# Patient Record
Sex: FEMALE | Race: BLACK OR AFRICAN AMERICAN | Employment: UNEMPLOYED | URBAN - METROPOLITAN AREA
[De-identification: names, ages, dates, MRNs, and addresses within clinical notes are randomized per-mention and may not be internally consistent; named-entity substitution may affect disease eponyms.]

---

## 2010-01-01 LAB — SICKLE CELL SCREEN: NEGATIVE

## 2017-03-22 ENCOUNTER — TELEPHONE (OUTPATIENT)
Dept: OBGYN CLINIC | Age: 27
End: 2017-03-22

## 2017-03-22 DIAGNOSIS — R30.0 DYSURIA: ICD-10-CM

## 2017-03-22 DIAGNOSIS — R35.0 FREQUENCY OF URINATION: Primary | ICD-10-CM

## 2017-03-29 ENCOUNTER — OFFICE VISIT (OUTPATIENT)
Dept: OBGYN CLINIC | Age: 27
End: 2017-03-29
Payer: MEDICARE

## 2017-03-29 ENCOUNTER — HOSPITAL ENCOUNTER (OUTPATIENT)
Age: 27
Setting detail: SPECIMEN
Discharge: HOME | End: 2017-03-29
Payer: MEDICARE

## 2017-03-29 VITALS
SYSTOLIC BLOOD PRESSURE: 102 MMHG | HEIGHT: 61 IN | BODY MASS INDEX: 31.34 KG/M2 | DIASTOLIC BLOOD PRESSURE: 68 MMHG | WEIGHT: 166 LBS

## 2017-03-29 DIAGNOSIS — N89.8 VAGINAL DISCHARGE: ICD-10-CM

## 2017-03-29 DIAGNOSIS — R31.9 HEMATURIA: ICD-10-CM

## 2017-03-29 DIAGNOSIS — N30.01 ACUTE CYSTITIS WITH HEMATURIA: ICD-10-CM

## 2017-03-29 DIAGNOSIS — R31.9 HEMATURIA: Primary | ICD-10-CM

## 2017-03-29 LAB
-: ABNORMAL
AMORPHOUS: ABNORMAL
BACTERIA: ABNORMAL
BILIRUBIN URINE: NEGATIVE
BILIRUBIN, POC: ABNORMAL
BLOOD URINE, POC: ABNORMAL
CASTS UA: ABNORMAL /LPF (ref 0–2)
CLARITY, POC: CLEAR
COLOR, POC: YELLOW
COLOR: YELLOW
COMMENT UA: ABNORMAL
CRYSTALS, UA: ABNORMAL /HPF
DIRECT EXAM: ABNORMAL
EPITHELIAL CELLS UA: ABNORMAL /HPF (ref 0–5)
GLUCOSE URINE, POC: ABNORMAL
GLUCOSE URINE: NEGATIVE
KETONES, POC: ABNORMAL
KETONES, URINE: NEGATIVE
LEUKOCYTE EST, POC: ABNORMAL
LEUKOCYTE ESTERASE, URINE: ABNORMAL
Lab: ABNORMAL
MUCUS: ABNORMAL
NITRITE, POC: ABNORMAL
NITRITE, URINE: NEGATIVE
OTHER OBSERVATIONS UA: ABNORMAL
PH UA: 7.5 (ref 5–8)
PH, POC: 7
PROTEIN UA: NEGATIVE
PROTEIN, POC: ABNORMAL
RBC UA: ABNORMAL /HPF (ref 0–2)
RENAL EPITHELIAL, UA: ABNORMAL /HPF
SPECIFIC GRAVITY UA: 1.02 (ref 1–1.03)
SPECIFIC GRAVITY, POC: 1.01
SPECIMEN DESCRIPTION: ABNORMAL
STATUS: ABNORMAL
TRICHOMONAS: ABNORMAL
TURBIDITY: ABNORMAL
URINE HGB: NEGATIVE
UROBILINOGEN, POC: 0.2
UROBILINOGEN, URINE: NORMAL
WBC UA: ABNORMAL /HPF (ref 0–5)
YEAST: ABNORMAL

## 2017-03-29 PROCEDURE — 81002 URINALYSIS NONAUTO W/O SCOPE: CPT | Performed by: NURSE PRACTITIONER

## 2017-03-29 PROCEDURE — 99213 OFFICE O/P EST LOW 20 MIN: CPT | Performed by: NURSE PRACTITIONER

## 2017-03-29 RX ORDER — NITROFURANTOIN 25; 75 MG/1; MG/1
100 CAPSULE ORAL 2 TIMES DAILY
Qty: 10 CAPSULE | Refills: 0 | Status: SHIPPED | OUTPATIENT
Start: 2017-03-29 | End: 2017-04-03

## 2017-03-30 ENCOUNTER — TELEPHONE (OUTPATIENT)
Dept: OBGYN CLINIC | Age: 27
End: 2017-03-30

## 2017-03-30 DIAGNOSIS — N76.0 BV (BACTERIAL VAGINOSIS): Primary | ICD-10-CM

## 2017-03-30 DIAGNOSIS — B96.89 BV (BACTERIAL VAGINOSIS): Primary | ICD-10-CM

## 2017-03-30 RX ORDER — METRONIDAZOLE 500 MG/1
500 TABLET ORAL 2 TIMES DAILY
Qty: 14 TABLET | Refills: 0 | Status: SHIPPED | OUTPATIENT
Start: 2017-03-30 | End: 2017-04-06

## 2017-03-31 LAB
CULTURE: ABNORMAL
CULTURE: ABNORMAL
Lab: ABNORMAL
ORGANISM: ABNORMAL
SPECIMEN DESCRIPTION: ABNORMAL
STATUS: ABNORMAL

## 2017-03-31 RX ORDER — CIPROFLOXACIN 250 MG/1
250 TABLET, FILM COATED ORAL 2 TIMES DAILY
Qty: 20 TABLET | Refills: 0 | Status: SHIPPED | OUTPATIENT
Start: 2017-03-31 | End: 2017-04-10

## 2017-05-01 ENCOUNTER — HOSPITAL ENCOUNTER (OUTPATIENT)
Age: 27
Setting detail: SPECIMEN
Discharge: HOME | End: 2017-05-01
Payer: MEDICARE

## 2017-05-01 ENCOUNTER — OFFICE VISIT (OUTPATIENT)
Dept: OBGYN CLINIC | Age: 27
End: 2017-05-01
Payer: MEDICARE

## 2017-05-01 VITALS
WEIGHT: 162 LBS | BODY MASS INDEX: 30.58 KG/M2 | DIASTOLIC BLOOD PRESSURE: 60 MMHG | SYSTOLIC BLOOD PRESSURE: 110 MMHG | HEIGHT: 61 IN

## 2017-05-01 DIAGNOSIS — R30.0 DYSURIA: ICD-10-CM

## 2017-05-01 DIAGNOSIS — B96.89 BV (BACTERIAL VAGINOSIS): ICD-10-CM

## 2017-05-01 DIAGNOSIS — B96.89 BV (BACTERIAL VAGINOSIS): Primary | ICD-10-CM

## 2017-05-01 DIAGNOSIS — N76.0 BV (BACTERIAL VAGINOSIS): Primary | ICD-10-CM

## 2017-05-01 DIAGNOSIS — N76.0 BV (BACTERIAL VAGINOSIS): ICD-10-CM

## 2017-05-01 DIAGNOSIS — Z20.2 STD EXPOSURE: ICD-10-CM

## 2017-05-01 LAB
DIRECT EXAM: NORMAL
Lab: NORMAL
SPECIMEN DESCRIPTION: NORMAL
STATUS: NORMAL

## 2017-05-01 PROCEDURE — 99213 OFFICE O/P EST LOW 20 MIN: CPT | Performed by: OBSTETRICS & GYNECOLOGY

## 2017-05-02 LAB
C TRACH DNA GENITAL QL NAA+PROBE: NEGATIVE
N. GONORRHOEAE DNA: NEGATIVE

## 2017-05-15 ENCOUNTER — TELEPHONE (OUTPATIENT)
Dept: OBGYN CLINIC | Age: 27
End: 2017-05-15

## 2017-05-15 DIAGNOSIS — Z11.3 SCREEN FOR STD (SEXUALLY TRANSMITTED DISEASE): Primary | ICD-10-CM

## 2017-05-16 ENCOUNTER — TELEPHONE (OUTPATIENT)
Dept: OBGYN CLINIC | Age: 27
End: 2017-05-16

## 2017-05-17 DIAGNOSIS — Z11.3 SCREEN FOR STD (SEXUALLY TRANSMITTED DISEASE): ICD-10-CM

## 2017-06-21 ENCOUNTER — TELEPHONE (OUTPATIENT)
Dept: OBGYN CLINIC | Age: 27
End: 2017-06-21

## 2017-06-21 DIAGNOSIS — T50.905A MEDICATION ADVERSE EFFECT, INITIAL ENCOUNTER: Primary | ICD-10-CM

## 2017-07-03 ENCOUNTER — HOSPITAL ENCOUNTER (OUTPATIENT)
Age: 27
Setting detail: SPECIMEN
Discharge: HOME | End: 2017-07-03
Payer: MEDICARE

## 2017-07-03 ENCOUNTER — OFFICE VISIT (OUTPATIENT)
Dept: OBGYN CLINIC | Age: 27
End: 2017-07-03
Payer: MEDICARE

## 2017-07-03 VITALS
BODY MASS INDEX: 30.06 KG/M2 | WEIGHT: 159.2 LBS | HEIGHT: 61 IN | SYSTOLIC BLOOD PRESSURE: 120 MMHG | DIASTOLIC BLOOD PRESSURE: 70 MMHG

## 2017-07-03 DIAGNOSIS — Z32.01 POSITIVE URINE PREGNANCY TEST: ICD-10-CM

## 2017-07-03 DIAGNOSIS — Z01.419 VISIT FOR GYNECOLOGIC EXAMINATION: ICD-10-CM

## 2017-07-03 DIAGNOSIS — N91.2 AMENORRHEA: Primary | ICD-10-CM

## 2017-07-03 DIAGNOSIS — N91.2 AMENORRHEA: ICD-10-CM

## 2017-07-03 LAB
CHLAMYDIA CULTURE: NEGATIVE
CONTROL: PRESENT
CULTURE, GONORRHOEAE: NEGATIVE
PREGNANCY TEST URINE, POC: POSITIVE

## 2017-07-03 PROCEDURE — 81025 URINE PREGNANCY TEST: CPT | Performed by: OBSTETRICS & GYNECOLOGY

## 2017-07-03 PROCEDURE — 99213 OFFICE O/P EST LOW 20 MIN: CPT | Performed by: OBSTETRICS & GYNECOLOGY

## 2017-07-05 DIAGNOSIS — B00.9 HERPES: ICD-10-CM

## 2017-07-05 DIAGNOSIS — Z87.42 HISTORY OF ABNORMAL CERVICAL PAP SMEAR: ICD-10-CM

## 2017-07-05 DIAGNOSIS — O09.891 HISTORY OF PRETERM DELIVERY, CURRENTLY PREGNANT, FIRST TRIMESTER: ICD-10-CM

## 2017-07-05 DIAGNOSIS — Z32.01 POSITIVE PREGNANCY TEST: Primary | ICD-10-CM

## 2017-07-05 LAB
C TRACH DNA GENITAL QL NAA+PROBE: NEGATIVE
N. GONORRHOEAE DNA: NEGATIVE

## 2017-07-07 LAB — CYTOLOGY REPORT: NORMAL

## 2017-07-10 ENCOUNTER — HOSPITAL ENCOUNTER (OUTPATIENT)
Age: 27
Setting detail: SPECIMEN
Discharge: HOME | End: 2017-07-10
Payer: MEDICARE

## 2017-07-10 ENCOUNTER — INITIAL PRENATAL (OUTPATIENT)
Dept: OBGYN CLINIC | Age: 27
End: 2017-07-10

## 2017-07-10 VITALS — DIASTOLIC BLOOD PRESSURE: 60 MMHG | WEIGHT: 159 LBS | SYSTOLIC BLOOD PRESSURE: 112 MMHG | BODY MASS INDEX: 30.04 KG/M2

## 2017-07-10 DIAGNOSIS — N91.2 AMENORRHEA: ICD-10-CM

## 2017-07-10 DIAGNOSIS — Z32.01 POSITIVE URINE PREGNANCY TEST: ICD-10-CM

## 2017-07-10 LAB
-: NORMAL
AMORPHOUS: NORMAL
BACTERIA: NORMAL
BILIRUBIN URINE: NEGATIVE
CASTS UA: NORMAL /LPF (ref 0–8)
COLOR: YELLOW
COMMENT UA: ABNORMAL
CRYSTALS, UA: NORMAL /HPF
EPITHELIAL CELLS UA: NORMAL /HPF (ref 0–5)
GLUCOSE URINE: NEGATIVE
HEPATITIS B SURFACE ANTIGEN: NONREACTIVE
HIV AG/AB: NONREACTIVE
KETONES, URINE: NEGATIVE
LEUKOCYTE ESTERASE, URINE: ABNORMAL
MUCUS: NORMAL
NITRITE, URINE: NEGATIVE
OTHER OBSERVATIONS UA: NORMAL
PH UA: 6.5 (ref 5–8)
PROTEIN UA: NEGATIVE
RBC UA: NORMAL /HPF (ref 0–4)
RENAL EPITHELIAL, UA: NORMAL /HPF
RUBV IGG SER QL: >500 IU/ML
SPECIFIC GRAVITY UA: 1.02 (ref 1–1.03)
T. PALLIDUM, IGG: NONREACTIVE
TRICHOMONAS: NORMAL
TURBIDITY: CLEAR
URINE HGB: NEGATIVE
UROBILINOGEN, URINE: NORMAL
WBC UA: NORMAL /HPF (ref 0–5)
YEAST: NORMAL

## 2017-07-11 LAB
ABO/RH: NORMAL
ANTIBODY SCREEN: NEGATIVE
CULTURE: NORMAL
CULTURE: NORMAL
Lab: NORMAL
SPECIMEN DESCRIPTION: NORMAL
STATUS: NORMAL

## 2017-07-14 ENCOUNTER — PROCEDURE VISIT (OUTPATIENT)
Dept: OBGYN CLINIC | Age: 27
End: 2017-07-14
Payer: MEDICARE

## 2017-07-14 DIAGNOSIS — Z3A.09 9 WEEKS GESTATION OF PREGNANCY: Primary | ICD-10-CM

## 2017-07-14 DIAGNOSIS — Z36.89 ENCOUNTER TO ESTABLISH GESTATIONAL AGE USING ULTRASOUND: ICD-10-CM

## 2017-07-14 LAB
CRL: NORMAL CM
SAC DIAMETER: NORMAL CM

## 2017-07-14 PROCEDURE — 76801 OB US < 14 WKS SINGLE FETUS: CPT | Performed by: OBSTETRICS & GYNECOLOGY

## 2017-08-04 ENCOUNTER — ROUTINE PRENATAL (OUTPATIENT)
Dept: PERINATAL CARE | Age: 27
End: 2017-08-04
Payer: MEDICARE

## 2017-08-04 VITALS
WEIGHT: 156 LBS | TEMPERATURE: 98.7 F | SYSTOLIC BLOOD PRESSURE: 109 MMHG | BODY MASS INDEX: 30.63 KG/M2 | RESPIRATION RATE: 16 BRPM | DIASTOLIC BLOOD PRESSURE: 66 MMHG | HEART RATE: 89 BPM | HEIGHT: 60 IN

## 2017-08-04 DIAGNOSIS — Z36.9 FIRST TRIMESTER SCREENING: Primary | ICD-10-CM

## 2017-08-04 DIAGNOSIS — O09.211 HISTORY OF PRETERM LABOR, CURRENT PREGNANCY, FIRST TRIMESTER: ICD-10-CM

## 2017-08-04 DIAGNOSIS — O36.80X0 EXAMINATION TO DETERMINE FETAL VIABILITY OF PREGNANCY, NOT APPLICABLE OR UNSPECIFIED FETUS: ICD-10-CM

## 2017-08-04 DIAGNOSIS — Z3A.12 12 WEEKS GESTATION OF PREGNANCY: ICD-10-CM

## 2017-08-04 PROBLEM — O09.219 HISTORY OF PRETERM LABOR, CURRENT PREGNANCY: Status: ACTIVE | Noted: 2017-08-04

## 2017-08-04 PROCEDURE — 76813 OB US NUCHAL MEAS 1 GEST: CPT | Performed by: OBSTETRICS & GYNECOLOGY

## 2017-08-04 PROCEDURE — 76801 OB US < 14 WKS SINGLE FETUS: CPT | Performed by: OBSTETRICS & GYNECOLOGY

## 2017-08-08 ENCOUNTER — TELEPHONE (OUTPATIENT)
Dept: PERINATAL CARE | Age: 27
End: 2017-08-08

## 2017-08-09 ENCOUNTER — TELEPHONE (OUTPATIENT)
Dept: OBGYN CLINIC | Age: 27
End: 2017-08-09

## 2017-08-11 ENCOUNTER — HOSPITAL ENCOUNTER (OUTPATIENT)
Age: 27
Setting detail: SPECIMEN
Discharge: HOME | End: 2017-08-11
Payer: MEDICARE

## 2017-08-11 ENCOUNTER — ROUTINE PRENATAL (OUTPATIENT)
Dept: OBGYN CLINIC | Age: 27
End: 2017-08-11
Payer: MEDICARE

## 2017-08-11 VITALS
WEIGHT: 157.4 LBS | BODY MASS INDEX: 30.49 KG/M2 | DIASTOLIC BLOOD PRESSURE: 76 MMHG | HEART RATE: 82 BPM | SYSTOLIC BLOOD PRESSURE: 112 MMHG

## 2017-08-11 DIAGNOSIS — O09.891 HISTORY OF PRETERM DELIVERY, CURRENTLY PREGNANT, FIRST TRIMESTER: ICD-10-CM

## 2017-08-11 DIAGNOSIS — R35.0 URINARY FREQUENCY: ICD-10-CM

## 2017-08-11 DIAGNOSIS — O09.91 SUPERVISION OF HIGH RISK PREGNANCY IN FIRST TRIMESTER: ICD-10-CM

## 2017-08-11 DIAGNOSIS — Z3A.13 13 WEEKS GESTATION OF PREGNANCY: Primary | ICD-10-CM

## 2017-08-11 DIAGNOSIS — Z3A.13 13 WEEKS GESTATION OF PREGNANCY: ICD-10-CM

## 2017-08-11 LAB
ABSOLUTE EOS #: 0.2 K/UL (ref 0–0.4)
ABSOLUTE LYMPH #: 3.2 K/UL (ref 1–4.8)
ABSOLUTE MONO #: 1.2 K/UL (ref 0.1–1.2)
BASOPHILS # BLD: 0 %
BASOPHILS ABSOLUTE: 0 K/UL (ref 0–0.2)
DIFFERENTIAL TYPE: ABNORMAL
EOSINOPHILS RELATIVE PERCENT: 1 %
HCT VFR BLD CALC: 35.8 % (ref 36–46)
HEMOGLOBIN: 11.9 G/DL (ref 12–16)
LYMPHOCYTES # BLD: 20 %
MCH RBC QN AUTO: 27.7 PG (ref 26–34)
MCHC RBC AUTO-ENTMCNC: 33.3 G/DL (ref 31–37)
MCV RBC AUTO: 83.2 FL (ref 80–100)
MONOCYTES # BLD: 7 %
PDW BLD-RTO: 13.4 % (ref 12.5–15.4)
PLATELET # BLD: 290 K/UL (ref 140–450)
PLATELET ESTIMATE: ABNORMAL
PMV BLD AUTO: 9.1 FL (ref 6–12)
RBC # BLD: 4.31 M/UL (ref 4–5.2)
RBC # BLD: ABNORMAL 10*6/UL
SEG NEUTROPHILS: 72 %
SEGMENTED NEUTROPHILS ABSOLUTE COUNT: 11.2 K/UL (ref 1.8–7.7)
WBC # BLD: 15.7 K/UL (ref 3.5–11)
WBC # BLD: ABNORMAL 10*3/UL

## 2017-08-11 PROCEDURE — 99213 OFFICE O/P EST LOW 20 MIN: CPT | Performed by: NURSE PRACTITIONER

## 2017-08-11 NOTE — TELEPHONE ENCOUNTER
Adenike with [facility] connection calling to say we need to call [facility] pharmacy to cancel rx. Called [phone] # [phone], # [phone] spoke with Avila. He states their branch in [facility] is handling this. He then connected me with them . but got disconnected

## 2017-08-11 NOTE — TELEPHONE ENCOUNTER
Notified karen bricneo [facility] care connection that patient does not wish to receive injections.

## 2017-09-07 ENCOUNTER — TELEPHONE (OUTPATIENT)
Dept: OBGYN CLINIC | Age: 27
End: 2017-09-07

## 2017-09-07 ENCOUNTER — ROUTINE PRENATAL (OUTPATIENT)
Dept: PERINATAL CARE | Age: 27
End: 2017-09-07
Payer: MEDICARE

## 2017-09-07 ENCOUNTER — TELEPHONE (OUTPATIENT)
Dept: PERINATAL CARE | Age: 27
End: 2017-09-07

## 2017-09-07 ENCOUNTER — HOSPITAL ENCOUNTER (OUTPATIENT)
Age: 27
Discharge: HOME | End: 2017-09-07
Payer: MEDICARE

## 2017-09-07 VITALS
DIASTOLIC BLOOD PRESSURE: 66 MMHG | SYSTOLIC BLOOD PRESSURE: 105 MMHG | HEART RATE: 88 BPM | WEIGHT: 153 LBS | RESPIRATION RATE: 16 BRPM | TEMPERATURE: 98.8 F | BODY MASS INDEX: 29.63 KG/M2

## 2017-09-07 DIAGNOSIS — O34.10 LEIOMYOMA IN PREGNANCY, UTERINE, ANTEPARTUM: ICD-10-CM

## 2017-09-07 DIAGNOSIS — Z3A.17 17 WEEKS GESTATION OF PREGNANCY: ICD-10-CM

## 2017-09-07 DIAGNOSIS — Z13.89 ENCOUNTER FOR ROUTINE SCREENING FOR MALFORMATION USING ULTRASONICS: ICD-10-CM

## 2017-09-07 DIAGNOSIS — O28.0 HIGH RISK PREGNANCY WITH LOW PAPPA (PREGNANCY-ASSOCIATED PLASMA PROTEIN A): Primary | ICD-10-CM

## 2017-09-07 DIAGNOSIS — O09.212 HISTORY OF PRETERM LABOR, CURRENT PREGNANCY, SECOND TRIMESTER: ICD-10-CM

## 2017-09-07 DIAGNOSIS — D25.9 LEIOMYOMA IN PREGNANCY, UTERINE, ANTEPARTUM: ICD-10-CM

## 2017-09-07 DIAGNOSIS — O09.899 HIGH RISK PREGNANCY WITH LOW PAPPA (PREGNANCY-ASSOCIATED PLASMA PROTEIN A): Primary | ICD-10-CM

## 2017-09-07 PROCEDURE — 76805 OB US >/= 14 WKS SNGL FETUS: CPT | Performed by: OBSTETRICS & GYNECOLOGY

## 2017-09-07 PROCEDURE — 99242 OFF/OP CONSLTJ NEW/EST SF 20: CPT | Performed by: OBSTETRICS & GYNECOLOGY

## 2017-09-07 PROCEDURE — 36415 COLL VENOUS BLD VENIPUNCTURE: CPT

## 2017-09-07 PROCEDURE — 76817 TRANSVAGINAL US OBSTETRIC: CPT | Performed by: OBSTETRICS & GYNECOLOGY

## 2017-09-08 LAB
SEND OUT REPORT: NORMAL
TEST NAME: NORMAL

## 2017-09-11 ENCOUNTER — TELEPHONE (OUTPATIENT)
Dept: OBGYN CLINIC | Age: 27
End: 2017-09-11

## 2017-09-21 ENCOUNTER — ROUTINE PRENATAL (OUTPATIENT)
Dept: PERINATAL CARE | Age: 27
End: 2017-09-21
Payer: MEDICARE

## 2017-09-21 VITALS
SYSTOLIC BLOOD PRESSURE: 100 MMHG | TEMPERATURE: 99.2 F | RESPIRATION RATE: 16 BRPM | HEART RATE: 84 BPM | BODY MASS INDEX: 30.41 KG/M2 | DIASTOLIC BLOOD PRESSURE: 60 MMHG | WEIGHT: 157 LBS

## 2017-09-21 DIAGNOSIS — O09.899 HIGH RISK PREGNANCY WITH LOW PAPPA (PREGNANCY-ASSOCIATED PLASMA PROTEIN A): ICD-10-CM

## 2017-09-21 DIAGNOSIS — O09.212 HISTORY OF PRETERM LABOR, CURRENT PREGNANCY, SECOND TRIMESTER: Primary | ICD-10-CM

## 2017-09-21 DIAGNOSIS — Z13.89 ENCOUNTER FOR ROUTINE SCREENING FOR MALFORMATION USING ULTRASONICS: ICD-10-CM

## 2017-09-21 DIAGNOSIS — O28.0 HIGH RISK PREGNANCY WITH LOW PAPPA (PREGNANCY-ASSOCIATED PLASMA PROTEIN A): ICD-10-CM

## 2017-09-21 DIAGNOSIS — Z3A.19 19 WEEKS GESTATION OF PREGNANCY: ICD-10-CM

## 2017-09-21 PROCEDURE — 76815 OB US LIMITED FETUS(S): CPT | Performed by: OBSTETRICS & GYNECOLOGY

## 2017-09-21 PROCEDURE — 76817 TRANSVAGINAL US OBSTETRIC: CPT | Performed by: OBSTETRICS & GYNECOLOGY

## 2017-09-22 PROBLEM — O44.42 LOW-LYING PLACENTA IN SECOND TRIMESTER: Status: ACTIVE | Noted: 2017-09-22

## 2017-09-29 ENCOUNTER — HOSPITAL ENCOUNTER (OUTPATIENT)
Age: 27
Discharge: HOME | End: 2017-09-29
Attending: OBSTETRICS & GYNECOLOGY | Admitting: OBSTETRICS & GYNECOLOGY
Payer: MEDICARE

## 2017-09-29 ENCOUNTER — TELEPHONE (OUTPATIENT)
Dept: OBGYN CLINIC | Age: 27
End: 2017-09-29

## 2017-09-29 VITALS
HEART RATE: 94 BPM | RESPIRATION RATE: 18 BRPM | TEMPERATURE: 97.8 F | SYSTOLIC BLOOD PRESSURE: 108 MMHG | DIASTOLIC BLOOD PRESSURE: 58 MMHG

## 2017-09-29 DIAGNOSIS — O44.42 LOW-LYING PLACENTA IN SECOND TRIMESTER: ICD-10-CM

## 2017-09-29 LAB
-: ABNORMAL
AMORPHOUS: ABNORMAL
BACTERIA: ABNORMAL
BILIRUBIN URINE: NEGATIVE
CASTS UA: ABNORMAL /LPF (ref 0–8)
COLOR: YELLOW
CRYSTALS, UA: ABNORMAL /HPF
EPITHELIAL CELLS UA: ABNORMAL /HPF (ref 0–5)
GLUCOSE URINE: NEGATIVE
KETONES, URINE: NEGATIVE
LEUKOCYTE ESTERASE, URINE: ABNORMAL
MUCUS: ABNORMAL
NITRITE, URINE: NEGATIVE
OTHER OBSERVATIONS UA: ABNORMAL
PH UA: 8.5 (ref 5–8)
PROTEIN UA: NEGATIVE
RBC UA: ABNORMAL /HPF (ref 0–4)
RENAL EPITHELIAL, UA: ABNORMAL /HPF
SPECIFIC GRAVITY UA: 1.02 (ref 1–1.03)
TRICHOMONAS: ABNORMAL
TURBIDITY: ABNORMAL
URINE HGB: NEGATIVE
UROBILINOGEN, URINE: NORMAL
WBC UA: ABNORMAL /HPF (ref 0–5)
YEAST: ABNORMAL

## 2017-09-29 PROCEDURE — 6370000000 HC RX 637 (ALT 250 FOR IP): Performed by: OBSTETRICS & GYNECOLOGY

## 2017-09-29 PROCEDURE — 81001 URINALYSIS AUTO W/SCOPE: CPT

## 2017-09-29 PROCEDURE — 87086 URINE CULTURE/COLONY COUNT: CPT

## 2017-09-29 PROCEDURE — 99213 OFFICE O/P EST LOW 20 MIN: CPT

## 2017-09-29 RX ORDER — ACETAMINOPHEN 500 MG
1000 TABLET ORAL EVERY 6 HOURS PRN
Status: DISCONTINUED | OUTPATIENT
Start: 2017-09-29 | End: 2017-09-29 | Stop reason: HOSPADM

## 2017-09-29 RX ADMIN — ACETAMINOPHEN 1000 MG: 500 TABLET ORAL at 13:26

## 2017-09-29 ASSESSMENT — PAIN SCALES - GENERAL: PAINLEVEL_OUTOF10: 4

## 2017-09-29 NOTE — IP AVS SNAPSHOT
Patient Information     Patient Name JAIDEN Haro 1990         This is your updated medication list to keep with you all times      ASK your doctor about these medications     RAFY-RUL PRENATAL VITAMINS 28-0.8 MG Tabs   Take 1 tablet by mouth daily       Progesterone 200 MG Supp

## 2017-09-29 NOTE — IP AVS SNAPSHOT
2. Merge onto [address] via [address] on the LEFT toward [address] / Lanie Buchanan / Mariah Silva   3. Take the [address] exit, [address]   4. Turn LEFT onto [address]   5. Turn RIGHT onto [address] [place] Obrien is on the RIGHT   [address] is in the [place]:   1. Merge onto [address]   2. Merge onto [address] toward [address]   3. Merge onto [address] toward [address]   4. Take the [address] exit, [address]   5. Turn LEFT onto [address]   6. Turn RIGHT onto [address] [place] Obrien is on the RIGHT   [address] is in the [place]:   1. Merge onto [address]   2. Merge onto [address] / [address] toward [address]   3. Merge onto [address] toward [address]   4. Take the [address] exit, [address]   5. Turn LEFT onto [address]   6. Turn RIGHT onto [address]   7. [address] is on the RIGHT   [address] is in the [facility] Appointments     10/5/2017 3:45 PM     Appointment with Padmini Santiago MD at [facility] ([phone])   Patients are asked to arrive 15 minutes prior to scheduled appointment time to complete paper work. [address] [place] [id number]       11/3/2017 4:30 PM     Appointment with Brittany Bullock CNP at [facility] ([phone])   Patients are asked to arrive 15 minutes prior to scheduled appointment time to complete paper work. [facility]         Preventive Care        Date Due    Tetanus Combination Vaccine (1 - Tdap) 12/26/2008    Yearly Flu Vaccine (1) 9/1/2017    Pap Smear 7/3/2018                 Care Plan Once You Return Home    This section includes instructions you will need to follow once you leave the hospital.  Your care team will discuss these with you, so you and those caring for you know how to best care for your health needs at home. This section may also include educational information about certain health topics that may be of help to you. Discharge Instructions       Antepartum discharge. (before labor)    Call physician if:  Contractions every 5 minutes if first baby, every  10 minutes if 2nd or more pregnancy. Vaginal bleeding like first day of period. Gush or leaking of fluid. Nausea , vomiting or diarrhea lasting 8 hours or longer. Temp of 100.4 for 2 instances. Cramping which is not relieved with emptying bladder, increasing water intake or resting. Change in baby movement or absence of fetal movement. .    Call if pain, urgency or burning with urination. Drink 10-12 glasses of water daily    Take all of medications prescribed. Keep next scheduled appointment. [employer] Signup     Our records indicate that you have an active [employer] account. You can view your After Visit Summary by going to [email or web address] and logging in with your [employer] username and password. If you don't have a [employer] username and password but a parent or guardian has access to your record, the parent or guardian should login with their own [employer] username and password and access your record to view the After Visit Summary. Additional Information  If you have questions, please contact the physician practice where you receive care. Remember, [employer] is NOT to be used for urgent needs. For medical emergencies, dial 911. For questions regarding your [employer] account call [phone]. If you have a clinical question, please call your doctor's office. View your information online  ? Review your current list of  medications, immunization, and allergies. ? Review your future test results online . ?  Review your discharge instructions provided by your caregivers at discharge    Certain functionality such as prescription refills, scheduling appointments or sending messages to your provider are not activated if your provider does not use [employer] in his/her office    For questions regarding your MyChart account call [phone]. If you have a clinical question, please call your doctor's office. The information on all pages of the After Visit Summary has been reviewed with me, the patient and/or responsible adult, by my health care provider(s). I had the opportunity to ask questions regarding this information. I understand I should dispose of my armband safely at home to protect my health information. A complete copy of the After Visit Summary has been given to me, the patient and/or responsible adult.            Patient Signature/Responsible Adult:____________________    Clinician Signature:_____________________    Date:_____________________    Time:_____________________

## 2017-09-30 LAB
CULTURE: NORMAL
CULTURE: NORMAL
Lab: NORMAL
SPECIMEN DESCRIPTION: NORMAL
STATUS: NORMAL

## 2017-10-05 ENCOUNTER — ROUTINE PRENATAL (OUTPATIENT)
Dept: OBGYN CLINIC | Age: 27
End: 2017-10-05
Payer: MEDICARE

## 2017-10-05 VITALS
HEART RATE: 80 BPM | BODY MASS INDEX: 29.94 KG/M2 | WEIGHT: 154.6 LBS | SYSTOLIC BLOOD PRESSURE: 122 MMHG | DIASTOLIC BLOOD PRESSURE: 76 MMHG

## 2017-10-05 DIAGNOSIS — O09.899 HIGH RISK PREGNANCY WITH LOW PAPPA: ICD-10-CM

## 2017-10-05 DIAGNOSIS — D25.9 UTERINE LEIOMYOMA, UNSPECIFIED LOCATION: Primary | ICD-10-CM

## 2017-10-05 DIAGNOSIS — O28.0 HIGH RISK PREGNANCY WITH LOW PAPPA: ICD-10-CM

## 2017-10-05 DIAGNOSIS — O09.892 HISTORY OF PRETERM DELIVERY, CURRENTLY PREGNANT IN SECOND TRIMESTER: ICD-10-CM

## 2017-10-05 DIAGNOSIS — O44.42 LOW-LYING PLACENTA IN SECOND TRIMESTER: ICD-10-CM

## 2017-10-05 PROCEDURE — 99213 OFFICE O/P EST LOW 20 MIN: CPT | Performed by: OBSTETRICS & GYNECOLOGY

## 2017-10-05 RX ORDER — NITROFURANTOIN 25; 75 MG/1; MG/1
100 CAPSULE ORAL 2 TIMES DAILY
COMMUNITY
End: 2017-10-18 | Stop reason: ALTCHOICE

## 2017-10-18 ENCOUNTER — ROUTINE PRENATAL (OUTPATIENT)
Dept: PERINATAL CARE | Age: 27
End: 2017-10-18
Payer: MEDICARE

## 2017-10-18 ENCOUNTER — HOSPITAL ENCOUNTER (OUTPATIENT)
Age: 27
Discharge: HOME | End: 2017-10-18
Payer: MEDICARE

## 2017-10-18 VITALS
RESPIRATION RATE: 16 BRPM | SYSTOLIC BLOOD PRESSURE: 111 MMHG | HEIGHT: 62 IN | WEIGHT: 155 LBS | HEART RATE: 116 BPM | BODY MASS INDEX: 28.52 KG/M2 | DIASTOLIC BLOOD PRESSURE: 68 MMHG | TEMPERATURE: 98.4 F

## 2017-10-18 DIAGNOSIS — O34.10 LEIOMYOMA IN PREGNANCY, UTERINE, ANTEPARTUM: ICD-10-CM

## 2017-10-18 DIAGNOSIS — D25.9 LEIOMYOMA IN PREGNANCY, UTERINE, ANTEPARTUM: ICD-10-CM

## 2017-10-18 DIAGNOSIS — O26.879 CERVICAL SHORTENING, ANTEPARTUM CONDITION OR COMPLICATION: ICD-10-CM

## 2017-10-18 DIAGNOSIS — O09.212 CURRENT PREGNANCY WITH HISTORY OF PRE-TERM LABOR IN SECOND TRIMESTER: ICD-10-CM

## 2017-10-18 DIAGNOSIS — O09.899 HIGH RISK PREGNANCY WITH LOW PAPPA (PREGNANCY-ASSOCIATED PLASMA PROTEIN A): Primary | ICD-10-CM

## 2017-10-18 DIAGNOSIS — O28.0 HIGH RISK PREGNANCY WITH LOW PAPPA (PREGNANCY-ASSOCIATED PLASMA PROTEIN A): Primary | ICD-10-CM

## 2017-10-18 DIAGNOSIS — Z3A.23 23 WEEKS GESTATION OF PREGNANCY: ICD-10-CM

## 2017-10-18 PROCEDURE — 76817 TRANSVAGINAL US OBSTETRIC: CPT | Performed by: OBSTETRICS & GYNECOLOGY

## 2017-10-18 PROCEDURE — 36415 COLL VENOUS BLD VENIPUNCTURE: CPT

## 2017-10-18 PROCEDURE — 82105 ALPHA-FETOPROTEIN SERUM: CPT

## 2017-10-18 PROCEDURE — 76811 OB US DETAILED SNGL FETUS: CPT | Performed by: OBSTETRICS & GYNECOLOGY

## 2017-10-18 RX ORDER — AMOXICILLIN 500 MG/1
500 CAPSULE ORAL 2 TIMES DAILY
COMMUNITY
End: 2017-11-02 | Stop reason: ALTCHOICE

## 2017-10-20 ENCOUNTER — TELEPHONE (OUTPATIENT)
Dept: OBGYN CLINIC | Age: 27
End: 2017-10-20

## 2017-10-20 LAB
AFP INTERPRETATION: NORMAL
AFP MOM: 1.05
AFP SPECIMEN: NORMAL
AFP: 107 NG/ML
DATE OF BIRTH: NORMAL
DATING METHOD: NORMAL
DETERMINED BY: NORMAL
DIABETIC: NO
DUE DATE: NORMAL
ESTIMATED DUE DATE: NORMAL
FAMILY HISTORY NTD: NO
GESTATIONAL AGE: 23.14 WEEKS
HISTORY OF ANEUPLOIDY?: NORMAL
INSULIN REQ DIABETES: NO
LAST MENSTRUAL PERIOD: NORMAL
MATERNAL AGE AT EDD: 28.1 YR
MATERNAL WEIGHT: 155
NUMBER OF FETUSES: NORMAL
PATIENT WEIGHT: 155 LBS
PHYSICIAN: NORMAL
RACE (MATERNAL): NORMAL
RACE: NORMAL
ZZ NTE CLEAN UP: HISTORY: NO

## 2017-10-23 ENCOUNTER — TELEPHONE (OUTPATIENT)
Dept: PERINATAL CARE | Age: 27
End: 2017-10-23

## 2017-10-23 NOTE — TELEPHONE ENCOUNTER
Phone call from pt stating she is unable to get a refill on her progesterone vaginal suppositories from [facility] and needs to have called into [facility] on [address]- O#[phone]. Writer contacted [facility] and spoke to pharmacist, Suzie Diamond, who confirmed the inability to refill suppositories. Writer called into [facility] above, pharmacist, Filipe Grey, - 250mg progesterone vaginal suppositories, 1 every hs, 1 mos supply with 2 refills.

## 2017-10-31 ENCOUNTER — HOSPITAL ENCOUNTER (OUTPATIENT)
Age: 27
Setting detail: OBSERVATION
Discharge: HOME | End: 2017-11-01
Attending: OBSTETRICS & GYNECOLOGY | Admitting: OBSTETRICS & GYNECOLOGY
Payer: MEDICARE

## 2017-10-31 PROBLEM — D25.9 UTERINE FIBROIDS AFFECTING PREGNANCY: Status: ACTIVE | Noted: 2017-10-31

## 2017-10-31 PROBLEM — O34.10 UTERINE FIBROIDS AFFECTING PREGNANCY: Status: ACTIVE | Noted: 2017-10-31

## 2017-10-31 PROBLEM — O47.00 PRETERM CONTRACTIONS: Status: ACTIVE | Noted: 2017-10-31

## 2017-10-31 PROBLEM — O09.90 HIGH-RISK PREGNANCY: Status: ACTIVE | Noted: 2017-10-31

## 2017-10-31 PROBLEM — O26.879 CERVICAL SHORTENING: Status: ACTIVE | Noted: 2017-10-31

## 2017-10-31 PROBLEM — O09.92 HRP (HIGH RISK PREGNANCY), SECOND TRIMESTER: Status: ACTIVE | Noted: 2017-10-31

## 2017-10-31 LAB
-: ABNORMAL
AMORPHOUS: ABNORMAL
BACTERIA: ABNORMAL
BILIRUBIN URINE: NEGATIVE
CASTS UA: ABNORMAL /LPF (ref 0–8)
COLOR: YELLOW
COMMENT UA: ABNORMAL
CRYSTALS, UA: ABNORMAL /HPF
DIRECT EXAM: ABNORMAL
EPITHELIAL CELLS UA: ABNORMAL /HPF (ref 0–5)
GLUCOSE URINE: NEGATIVE
KETONES, URINE: ABNORMAL
LEUKOCYTE ESTERASE, URINE: ABNORMAL
Lab: ABNORMAL
MUCUS: ABNORMAL
NITRITE, URINE: NEGATIVE
OTHER OBSERVATIONS UA: ABNORMAL
PH UA: 8 (ref 5–8)
PROTEIN UA: NEGATIVE
RBC UA: ABNORMAL /HPF (ref 0–4)
RENAL EPITHELIAL, UA: ABNORMAL /HPF
SPECIFIC GRAVITY UA: 1.01 (ref 1–1.03)
SPECIMEN DESCRIPTION: ABNORMAL
STATUS: ABNORMAL
TRICHOMONAS: ABNORMAL
TURBIDITY: CLEAR
URINE HGB: NEGATIVE
UROBILINOGEN, URINE: NORMAL
WBC UA: ABNORMAL /HPF (ref 0–5)
YEAST: ABNORMAL

## 2017-10-31 PROCEDURE — 87510 GARDNER VAG DNA DIR PROBE: CPT

## 2017-10-31 PROCEDURE — 87086 URINE CULTURE/COLONY COUNT: CPT

## 2017-10-31 PROCEDURE — 87480 CANDIDA DNA DIR PROBE: CPT

## 2017-10-31 PROCEDURE — 87591 N.GONORRHOEAE DNA AMP PROB: CPT

## 2017-10-31 PROCEDURE — G0378 HOSPITAL OBSERVATION PER HR: HCPCS

## 2017-10-31 PROCEDURE — 86403 PARTICLE AGGLUT ANTBDY SCRN: CPT

## 2017-10-31 PROCEDURE — 87660 TRICHOMONAS VAGIN DIR PROBE: CPT

## 2017-10-31 PROCEDURE — 96360 HYDRATION IV INFUSION INIT: CPT

## 2017-10-31 PROCEDURE — 81001 URINALYSIS AUTO W/SCOPE: CPT

## 2017-10-31 PROCEDURE — 6360000002 HC RX W HCPCS: Performed by: STUDENT IN AN ORGANIZED HEALTH CARE EDUCATION/TRAINING PROGRAM

## 2017-10-31 PROCEDURE — 87186 SC STD MICRODIL/AGAR DIL: CPT

## 2017-10-31 PROCEDURE — 87491 CHLMYD TRACH DNA AMP PROBE: CPT

## 2017-10-31 PROCEDURE — 2580000003 HC RX 258: Performed by: OBSTETRICS & GYNECOLOGY

## 2017-10-31 PROCEDURE — 96361 HYDRATE IV INFUSION ADD-ON: CPT

## 2017-10-31 PROCEDURE — 2580000003 HC RX 258: Performed by: STUDENT IN AN ORGANIZED HEALTH CARE EDUCATION/TRAINING PROGRAM

## 2017-10-31 PROCEDURE — 96372 THER/PROPH/DIAG INJ SC/IM: CPT

## 2017-10-31 PROCEDURE — 76817 TRANSVAGINAL US OBSTETRIC: CPT | Performed by: OBSTETRICS & GYNECOLOGY

## 2017-10-31 PROCEDURE — 6370000000 HC RX 637 (ALT 250 FOR IP): Performed by: STUDENT IN AN ORGANIZED HEALTH CARE EDUCATION/TRAINING PROGRAM

## 2017-10-31 RX ORDER — TERBUTALINE SULFATE 1 MG/ML
0.25 INJECTION, SOLUTION SUBCUTANEOUS ONCE
Status: COMPLETED | OUTPATIENT
Start: 2017-10-31 | End: 2017-10-31

## 2017-10-31 RX ORDER — DIPHENHYDRAMINE HCL 25 MG
25 TABLET ORAL ONCE
Status: COMPLETED | OUTPATIENT
Start: 2017-10-31 | End: 2017-10-31

## 2017-10-31 RX ORDER — ACETAMINOPHEN 500 MG
1000 TABLET ORAL EVERY 6 HOURS PRN
Status: DISCONTINUED | OUTPATIENT
Start: 2017-10-31 | End: 2017-11-01 | Stop reason: HOSPADM

## 2017-10-31 RX ORDER — PRENATAL WITH FERROUS FUM AND FOLIC ACID 3080; 920; 120; 400; 22; 1.84; 3; 20; 10; 1; 12; 200; 27; 25; 2 [IU]/1; [IU]/1; MG/1; [IU]/1; MG/1; MG/1; MG/1; MG/1; MG/1; MG/1; UG/1; MG/1; MG/1; MG/1; MG/1
1 TABLET ORAL DAILY
Status: DISCONTINUED | OUTPATIENT
Start: 2017-10-31 | End: 2017-11-01 | Stop reason: HOSPADM

## 2017-10-31 RX ORDER — SODIUM CHLORIDE, SODIUM LACTATE, POTASSIUM CHLORIDE, CALCIUM CHLORIDE 600; 310; 30; 20 MG/100ML; MG/100ML; MG/100ML; MG/100ML
INJECTION, SOLUTION INTRAVENOUS CONTINUOUS
Status: DISCONTINUED | OUTPATIENT
Start: 2017-10-31 | End: 2017-10-31

## 2017-10-31 RX ORDER — SODIUM CHLORIDE, SODIUM LACTATE, POTASSIUM CHLORIDE, AND CALCIUM CHLORIDE .6; .31; .03; .02 G/100ML; G/100ML; G/100ML; G/100ML
1000 INJECTION, SOLUTION INTRAVENOUS ONCE
Status: COMPLETED | OUTPATIENT
Start: 2017-10-31 | End: 2017-10-31

## 2017-10-31 RX ORDER — BETAMETHASONE SODIUM PHOSPHATE AND BETAMETHASONE ACETATE 3; 3 MG/ML; MG/ML
12 INJECTION, SUSPENSION INTRA-ARTICULAR; INTRALESIONAL; INTRAMUSCULAR; SOFT TISSUE ONCE
Status: COMPLETED | OUTPATIENT
Start: 2017-10-31 | End: 2017-10-31

## 2017-10-31 RX ADMIN — Medication 200 MG: at 21:37

## 2017-10-31 RX ADMIN — TERBUTALINE SULFATE 0.25 MG: 1 INJECTION SUBCUTANEOUS at 01:59

## 2017-10-31 RX ADMIN — SODIUM CHLORIDE, POTASSIUM CHLORIDE, SODIUM LACTATE AND CALCIUM CHLORIDE 1000 ML: 600; 310; 30; 20 INJECTION, SOLUTION INTRAVENOUS at 02:58

## 2017-10-31 RX ADMIN — DIPHENHYDRAMINE HCL 25 MG: 25 TABLET ORAL at 23:24

## 2017-10-31 RX ADMIN — BETAMETHASONE SODIUM PHOSPHATE AND BETAMETHASONE ACETATE 12 MG: 3; 3 INJECTION, SUSPENSION INTRA-ARTICULAR; INTRALESIONAL; INTRAMUSCULAR at 02:00

## 2017-10-31 RX ADMIN — SODIUM CHLORIDE, POTASSIUM CHLORIDE, SODIUM LACTATE AND CALCIUM CHLORIDE: 600; 310; 30; 20 INJECTION, SOLUTION INTRAVENOUS at 12:11

## 2017-10-31 NOTE — PROGRESS NOTES
Maternal Fetal Medicine   Interval Note    Whit Hayes is a 31 y.o. female  at 25w0d     Case discussed with Dr. Jaime Abarca (on call for Dr. Kasandra Hunter)    MFM Sono Report: Shortened cervical length for gestational age, cervical length 10 mm with funneling. Multiple uterine leiomyomas. Cephalic, Anterior placenta. Plan: MFM recommended cultures for further investigation of  contractions. Dr. Jaime Abarca updated and in agreement with plan. Discussed collection of vaginitis probe (Affirm), GC/C as well as a UA w/ reflex. Will continue to monitor overnight as well as keep patient to receive her second dose of Celestone. Dr Jaime Abarca to round in morning (17)    OB Residents updated, RN updated.      Stewart Grace DO  Ob/Gyn Resident  10/31/2017, 3:30 PM

## 2017-10-31 NOTE — PROGRESS NOTES
Date: 10/31/2017  Time: 9:23 AM    Ob/Gyn Attending Physician Statement  I have discussed the care of Margaux Davis, including pertinent history and exam findings,  with the resident. I have reviewed their note in the electronic medical record. I have seen and examined the patient and the key elements of all parts of the encounter have been performed/reviewed by me . I agree with the assessment, plan and orders as documented by the resident. Vitals:    10/31/17 0031 10/31/17 0741   BP: 111/66 109/65   Pulse: 95 88   Resp: 18 18   Temp: 98.9 °F (37.2 °C) 97.3 °F (36.3 °C)   TempSrc: Oral Oral     The patient was at [facility] with contractions, but did not change her cervix, so I felt she was stable enough to leave there (where she should not have gone) but told her to be sure to come to [facility]. V.  Here, she has received a dose of Terbutaline which did seem to quiet the contractions, and got her first dose of steroids at 0200. She says she started cramping again at about 0600. She looks comfortable and contractions are small on monitor, but I can see them, every 5 minutes or so. I will ask [facility] to comment, if Dr. George Tejada has any other suggestions. Otherwise, I intend for her to stay, at least until there second dose of steroids.     Attending's Name: Basil Murguia M.D.

## 2017-10-31 NOTE — DISCHARGE SUMMARY
Obstetric Discharge Summary  [facility]    Patient Name: Jamal Khan  Patient : 1990  Primary Care Physician: No primary care provider on file. Admit Date: 10/31/2017    Principal Diagnosis: IUP at 25w0d, admitted for  contractions     Her pregnancy has been complicated by:   Patient Active Problem List   Diagnosis    ASCUS (atypical squamous cells of undetermined significance) on Pap smear    Herpes simplex type 2 infection    H/O PTD (G1 @ 36wks)    Low TORI-A    Leiomyoma in pregnancy, uterine, antepartum    Low-lying placenta in second trimester    Cervical shortening, antepartum condition or complication    Rh+/RI/GBS unk     contractions    Shortened cervix    Uterine fibroids    S/p Celestone 10/31,     Threatened  labor, antepartum    Current pregnancy with history of pre-term labor in second trimester       Infection Present?: Yes - UTI, bacterial vaginosis   Hospital Acquired: No      Consultations: MFM    Pertinent Findings & Procedures:   Jamal Khan is a 31 y.o. female  at 25w0d admitted for  contractions ; received Terbutaline x1, IV fluids and initial course of Celestone x on 10/30 and . MFM was consulted on 10/31. MFM scan on 10/31 revealed a shortened cervical length for gestational age, cervical length 10 mm with funneling. Multiple uterine leiomyomas. Cephalic, Anterior placenta. .     Cultures were collected and revealed bacterial vaginosis, treatment with Flagyl was initiated. Patient was also found to have bacteruria and was started on keflex 500 mg QID x 7 days.        Course of patient: uncomplicated    Discharge to: Home    Readmission planned: no     Recommendations on Discharge:     Medications:      Medication List      START taking these medications    cephALEXin 500 MG capsule  Commonly known as:  KEFLEX  Take 1 capsule by mouth 4 times daily for 7 days     metroNIDAZOLE 500 MG

## 2017-10-31 NOTE — PROGRESS NOTES
PROGRESS NOTE    Subjective:   Patient has been seen and examined. Patient is resting comfortably in bed. Patient is reports her contractions have improved and she is now feeling occasional contractions. Denies headaches, vision changes, chest pain, SOB, RUQ pain, N/V, or increased peripheral edema. Denies urinary symptoms or vaginal discharge. She reports fetal movement is present, complains of occasional contractions, denies loss of fluid, denies vaginal bleeding.       Objective:   Vitals:  Vitals:    10/31/17 0031   BP: 111/66   Pulse: 95   Resp: 18   Temp: 98.9 °F (37.2 °C)   TempSrc: Oral       Physical Exam:  Fetal Heart Monitor:  Baseline Heart Rate 140, moderate variability, present accelerations, absent decelerations  [place]: contractions, rare     General appearance:  awake, alert, cooperative, no apparent distress, and appears stated age  Neurologic:  alert, oriented, normal speech, no focal findings or movement disorder noted  Lungs:  No increased work of breathing, good air exchange, clear to auscultation bilaterally, no crackles or wheezing  Heart:  Normal apical impulse, regular rate and rhythm, normal S1 and S2, no S3 or S4, and no murmur noted  Abdomen:  abdomen is soft without significant tenderness, masses, organomegaly or guarding  Extremities: no calf tenderness, edematous BLE       Assessment/Plan:  Edmundo Gill is a 31 y.o. female  25w0d IUP   - GBS not done / Rh positive / R immune   - No indication for GBS prophylaxis, unless delivery imminent       Contractions   - Afebrile, VSS   - FHT: category I tracing   - TOCO: contractions, rare    - IV fluid bolus   - S/P Terbutaline x1   - S/P Celestone on 10/31/17; next dose on 17 at 0200   - Patient reports contractions have now spaced out    - SCDs     H/O spontaneous  delivery    - G1 @ 36w2d   - Following with MFM every two weeks for TVUS   - Progesterone 200 mg vaginal suppositories     Shortened cervix    - Slightly shortened cervix on anatomy ultrasound (27 mm)     Low TORI-A    - Low TORI-A on first trimester screen    - NIPT wnl     Low -lying placenta (resolved)     Uterine fibroids     Patient Active Problem List    Diagnosis Date Noted    Rh+/RI/GBS unk 10/31/2017     contractions 10/31/2017    Shortened cervix 10/31/2017    Uterine fibroids 10/31/2017    S/p Celestone 10/31/17, next on 11/1/17  10/31/2017     Overview Note:     Celestone 10/31/17, next on 17 @ 0200      Cervical shortening, antepartum condition or complication     Low-lying placenta in second trimester 2017     Overview Note:     14.2 mm from internal os on anatomy scan 17.  Low TORI-A 2017     Overview Note:     Growth scans q4-6 wks for the duration of pregnancy.  testing starting at 32 weeks (twice weekly NST, weekly GELA/BPP).       Leiomyoma in pregnancy, uterine, antepartum 2017    H/O PTD (G1 @ 36wks) 2017     Overview Note:     U/S q 2 weeks from 16-34 weeks, [place] until 36.6 weeks      Herpes simplex type 2 infection 2015    Hyperglycemia     ASCUS (atypical squamous cells of undetermined significance) on Pap smear          Britt Velez DO  Ob/Gyn Resident  10/31/2017, 3:47 AM

## 2017-10-31 NOTE — H&P
Exam:  Chief Complaint:  Pregnancy    Anterior/ Posterior Spinal Curves: Lumbar Lordosis -  Increased  Scoliosis (Lateral Spinal Curves): None  Assessment Tool:  T= Tenderness, A= Asymmetry, R= Restricted Motion (A=Active, P=Passive), T=Tissue Texture Changes  Region Evaluated : Severity / Specific of Major Somatic Dysfunction  M99.03 Lumbar -  Minor TART - more than BG levels -   Major Correlations with:  Genitourinary  Structural Diagnosis: Increased lumbar lordosis  Treatment Plan: Outpatient       PRENATAL LAB RESULTS:   Blood Type/Rh: O pos  Antibody Screen: negative  Hemoglobin, Hematocrit, Platelets: 64.0 / 48.4 / 290  Rubella: immune  T.  Pallidum, IgG: non-reactive   Hepatitis B Surface Antigen: non-reactive   HIV: non-reactive   Sickle Cell Screen: negative  Gonorrhea: negative  Chlamydia: negative  Urine culture: negative, date: 17    1 hour Glucose Tolerance Test: not done  3 hour Glucose Tolerance Test: N/A    Group B Strep: not done  Cystic Fibrosis Screen: negative  First Trimester Screen: low TORI-A  MSAFP/Multiple Markers: normal  Non-Invasive Prenatal Testing: no aneuploidy detected  Anatomy US: normal male anatomy, anterior placenta, 3 VC, multiple uterine fibroids, cervix slightly shortened (< 30 mm, > 25 mm)    ASSESSMENT & PLAN:  Marisa Muro is a 31 y.o. female  at 25w0d IUP   - GBS not done / Rh positive / R immune   - No indication for GBS prophylaxis, unless delivery imminent      Contractions   - Patient complains of contractions every 3-4 beginning at 0900 today   - Afebrile, VSS   - FHT: category I tracing   - TOCO: contractions every 2-3 minutes   - SSE: cervical os visualized, no bleeding or lesions   - SVE: fingertip/ thick/ high   - UA collected at outlying facility, negative   - FFN not collected due to recent cervical check   - GC/C, vaginitis probe collected, not sent per provider    - Admit to Labor and Delivery for observation, under service of  Raúl   - IV fluid bolus   - Terbutaline x1   - First dose of Celestone today, will need next dose on 17 at 56   - Dr. Abi Guillaume updated    H/O spontaneous  delivery    - G1 @ 36w2d   - Following with MFM every two weeks for TVUS   - Progesterone 200 mg vaginal suppositories    Shortened cervix    - Slightly shortened cervix on anatomy ultrasound (27 mm)    Low TORI-A    - Low OTRI-A on first trimester screen    - NIPT wnl    Low -lying placenta    Uterine fibroids    Patient Active Problem List    Diagnosis Date Noted    Cervical shortening, antepartum condition or complication     Low-lying placenta in second trimester 2017     14.2 mm from internal os on anatomy scan 17.  High risk pregnancy with low PAPPA (pregnancy-associated plasma protein A) 2017     Growth scans q4-6 wks for the duration of pregnancy.  testing starting at 32 weeks (twice weekly NST, weekly GELA/BPP).  Leiomyoma in pregnancy, uterine, antepartum 2017    History of  labor, current pregnancy 2017     U/S q 2 weeks from 16-34 weeks, [place] until 36.6 weeks      Herpes simplex type 2 infection 2015    Hyperglycemia     ASCUS (atypical squamous cells of undetermined significance) on Pap smear        Plan discussed with senior resident and Dr. Abi Guillaume, who is agreeable. Steroids given this admission: Yes    Risks, benefits, alternatives and possible complications have been discussed in detail with the patient. Admission, and post admission procedures and expectations were discussed in detail. All questions were answered.     Attending's Name: Dr. Janet Oscar DO  Ob/Gyn Resident  10/31/2017, 12:29 AM

## 2017-10-31 NOTE — CARE COORDINATION
Initial Discharge Planning: Anticipate dc per physician recommendations once contractions resolve and steroids are administered.

## 2017-11-01 ENCOUNTER — TELEPHONE (OUTPATIENT)
Dept: OBGYN CLINIC | Age: 27
End: 2017-11-01

## 2017-11-01 VITALS
HEART RATE: 87 BPM | SYSTOLIC BLOOD PRESSURE: 106 MMHG | RESPIRATION RATE: 16 BRPM | TEMPERATURE: 98.5 F | DIASTOLIC BLOOD PRESSURE: 61 MMHG

## 2017-11-01 PROBLEM — Z34.90 PRENATAL CARE: Status: ACTIVE | Noted: 2017-11-01

## 2017-11-01 LAB
C TRACH DNA GENITAL QL NAA+PROBE: NEGATIVE
N. GONORRHOEAE DNA: NEGATIVE

## 2017-11-01 PROCEDURE — 96372 THER/PROPH/DIAG INJ SC/IM: CPT

## 2017-11-01 PROCEDURE — 6360000002 HC RX W HCPCS: Performed by: STUDENT IN AN ORGANIZED HEALTH CARE EDUCATION/TRAINING PROGRAM

## 2017-11-01 PROCEDURE — 6370000000 HC RX 637 (ALT 250 FOR IP): Performed by: STUDENT IN AN ORGANIZED HEALTH CARE EDUCATION/TRAINING PROGRAM

## 2017-11-01 PROCEDURE — 6370000000 HC RX 637 (ALT 250 FOR IP): Performed by: OBSTETRICS & GYNECOLOGY

## 2017-11-01 PROCEDURE — 99218 PR INITIAL OBSERVATION CARE/DAY 30 MINUTES: CPT | Performed by: OBSTETRICS & GYNECOLOGY

## 2017-11-01 PROCEDURE — G0378 HOSPITAL OBSERVATION PER HR: HCPCS

## 2017-11-01 RX ORDER — CEPHALEXIN 500 MG/1
500 CAPSULE ORAL 4 TIMES DAILY
Qty: 28 CAPSULE | Refills: 0 | Status: SHIPPED | OUTPATIENT
Start: 2017-11-01 | End: 2017-11-03 | Stop reason: ALTCHOICE

## 2017-11-01 RX ORDER — BETAMETHASONE SODIUM PHOSPHATE AND BETAMETHASONE ACETATE 3; 3 MG/ML; MG/ML
12 INJECTION, SUSPENSION INTRA-ARTICULAR; INTRALESIONAL; INTRAMUSCULAR; SOFT TISSUE ONCE
Status: COMPLETED | OUTPATIENT
Start: 2017-11-01 | End: 2017-11-01

## 2017-11-01 RX ORDER — METRONIDAZOLE 500 MG/1
500 TABLET ORAL 2 TIMES DAILY
Qty: 20 TABLET | Refills: 0 | Status: SHIPPED | OUTPATIENT
Start: 2017-11-01 | End: 2017-11-01

## 2017-11-01 RX ORDER — METRONIDAZOLE 500 MG/1
500 TABLET ORAL 2 TIMES DAILY
Qty: 14 TABLET | Refills: 0 | Status: SHIPPED | OUTPATIENT
Start: 2017-11-01 | End: 2018-07-06 | Stop reason: SDUPTHER

## 2017-11-01 RX ORDER — CEPHALEXIN 500 MG/1
500 CAPSULE ORAL 4 TIMES DAILY
Qty: 16 CAPSULE | Refills: 0 | Status: SHIPPED | OUTPATIENT
Start: 2017-11-01 | End: 2017-11-01

## 2017-11-01 RX ORDER — METRONIDAZOLE 500 MG/1
500 TABLET ORAL 2 TIMES DAILY
Status: DISCONTINUED | OUTPATIENT
Start: 2017-11-01 | End: 2017-11-01 | Stop reason: HOSPADM

## 2017-11-01 RX ORDER — CEPHALEXIN 500 MG/1
500 CAPSULE ORAL EVERY 6 HOURS SCHEDULED
Status: DISCONTINUED | OUTPATIENT
Start: 2017-11-01 | End: 2017-11-01 | Stop reason: HOSPADM

## 2017-11-01 RX ADMIN — CEPHALEXIN 500 MG: 500 CAPSULE ORAL at 06:21

## 2017-11-01 RX ADMIN — BETAMETHASONE SODIUM PHOSPHATE AND BETAMETHASONE ACETATE 12 MG: 3; 3 INJECTION, SUSPENSION INTRA-ARTICULAR; INTRALESIONAL; INTRAMUSCULAR at 02:03

## 2017-11-01 RX ADMIN — METRONIDAZOLE 500 MG: 500 TABLET, FILM COATED ORAL at 06:21

## 2017-11-01 RX ADMIN — Medication 1 TABLET: at 07:49

## 2017-11-01 NOTE — PROGRESS NOTES
PROGRESS NOTE    Subjective:   Patient has been seen and examined. Patient is resting comfortably in bed. Patient states she has not felt any contractions since around 0100 this morning. She reports + fetal movement, denies any loss of fluid or vaginal bleeding. Denies headaches, vision changes, chest pain, SOB, RUQ pain, N/V, or increased peripheral edema.      Objective:   Vitals:  Vitals:    10/31/17 0741 10/31/17 1200 10/31/17 1611 10/31/17 1944   BP: 109/65 113/62 (!) 104/59 117/68   Pulse: 88 109 89 105   Resp: 18 18 16 16   Temp: 97.3 °F (36.3 °C) 98.1 °F (36.7 °C) 98.6 °F (37 °C) 98.6 °F (37 °C)   TempSrc: Oral Oral Oral Oral       Physical Exam:  Fetal Heart Monitor:  Baseline Heart Rate 140, moderate variability, present accelerations, absent decelerations  [place]: contractions, rare     General appearance:  awake, alert, cooperative, no apparent distress, and appears stated age  Neurologic:  alert, oriented, normal speech, no focal findings or movement disorder noted  Lungs:  No increased work of breathing, good air exchange, clear to auscultation bilaterally, no crackles or wheezing  Heart:  Normal apical impulse, regular rate and rhythm, normal S1 and S2, no S3 or S4, and no murmur noted  Abdomen:  abdomen is soft without significant tenderness, masses, organomegaly or guarding  Extremities: no calf tenderness, edematous BLE       Assessment/Plan:  Radha Son is a 31 y.o. female  25w1d IUP   - GBS not done / Rh positive / R immune   - No indication for GBS prophylaxis, unless delivery imminent       Contractions   - VSS, patient afebrile    - Category 1 feta heart tracing    - TOCO showing no contractions     - S/P Terbutaline x1   - S/P Celestone on 10/31,    - SCDs     H/O Spontaneous  Delivery    - G1 @ 36w2d   - Following with MFM every two weeks for TVUS   - Progesterone 200 mg vaginal suppositories     Shortened Cervix    - Slightly shortened cervix on anatomy shortening, antepartum condition or complication 33/20/2603    Leiomyoma in pregnancy, uterine, antepartum 09/07/2017    Herpes simplex type 2 infection 07/31/2015    ASCUS (atypical squamous cells of undetermined significance) on Pap smear          Anika Muse DO  Ob/Gyn Resident  11/1/2017, 4:16 AM         Attending Physician Statement  I have discussed the care of Pavithra Roberson, including pertinent history and exam findings,  with the resident. I have reviewed the key elements of all parts of the encounter with the resident. I agree with the assessment, plan and orders as documented by the resident. Pt seen and examined. Cervix feels fingertip still/60%effaced/med/mid. Contractions have subsided. +BV on affirm, will treat with Flagyl BID x 1 week. U/a suspicious for infection, culture pending, will continue Keflex. TVUS with MFM in two weeks to assess CL (10 mm with funneling yesterday). FHT Cat I. Pt inquired about working for [employer] of responsibilities. I gave her a letter stating what she is able to do and not do but told her to find out the specifics of her duties. No intercourse/tampons/douching until next MFM USN. Low lying placenta resolved on USN yesterday. All pt's questions answered and plan discussed with resident. S/p SARBJIT x 2 10/31-11/1/17 at 0200.  (GE Modifier)

## 2017-11-01 NOTE — FLOWSHEET NOTE
Patient was given discharge instructions and verbalized understanding. RN answered all of patients questions. Patient discharged with all belongings, left ambulatory with her mother.

## 2017-11-02 ENCOUNTER — ROUTINE PRENATAL (OUTPATIENT)
Dept: OBGYN CLINIC | Age: 27
End: 2017-11-02
Payer: MEDICARE

## 2017-11-02 VITALS — WEIGHT: 152 LBS | SYSTOLIC BLOOD PRESSURE: 116 MMHG | BODY MASS INDEX: 27.8 KG/M2 | DIASTOLIC BLOOD PRESSURE: 70 MMHG

## 2017-11-02 DIAGNOSIS — Z3A.25 25 WEEKS GESTATION OF PREGNANCY: ICD-10-CM

## 2017-11-02 DIAGNOSIS — O44.42 LOW-LYING PLACENTA IN SECOND TRIMESTER: ICD-10-CM

## 2017-11-02 DIAGNOSIS — O34.33: ICD-10-CM

## 2017-11-02 DIAGNOSIS — O09.92 HIGH-RISK PREGNANCY, SECOND TRIMESTER: Primary | ICD-10-CM

## 2017-11-02 LAB
CULTURE: ABNORMAL
Lab: ABNORMAL
ORGANISM: ABNORMAL
SPECIMEN DESCRIPTION: ABNORMAL
STATUS: ABNORMAL

## 2017-11-02 PROCEDURE — 1036F TOBACCO NON-USER: CPT | Performed by: OBSTETRICS & GYNECOLOGY

## 2017-11-02 PROCEDURE — G8417 CALC BMI ABV UP PARAM F/U: HCPCS | Performed by: OBSTETRICS & GYNECOLOGY

## 2017-11-02 PROCEDURE — G8484 FLU IMMUNIZE NO ADMIN: HCPCS | Performed by: OBSTETRICS & GYNECOLOGY

## 2017-11-02 PROCEDURE — 99213 OFFICE O/P EST LOW 20 MIN: CPT | Performed by: OBSTETRICS & GYNECOLOGY

## 2017-11-02 PROCEDURE — G8427 DOCREV CUR MEDS BY ELIG CLIN: HCPCS | Performed by: OBSTETRICS & GYNECOLOGY

## 2017-11-03 DIAGNOSIS — O23.43 UTI (URINARY TRACT INFECTION) DURING PREGNANCY, THIRD TRIMESTER: Primary | ICD-10-CM

## 2017-11-03 RX ORDER — NITROFURANTOIN 25; 75 MG/1; MG/1
100 CAPSULE ORAL 2 TIMES DAILY
Qty: 14 CAPSULE | Refills: 0 | Status: SHIPPED | OUTPATIENT
Start: 2017-11-03 | End: 2017-11-10

## 2017-11-14 DIAGNOSIS — Z3A.25 25 WEEKS GESTATION OF PREGNANCY: ICD-10-CM

## 2017-11-14 DIAGNOSIS — O09.92 HIGH-RISK PREGNANCY, SECOND TRIMESTER: ICD-10-CM

## 2017-11-15 ENCOUNTER — ROUTINE PRENATAL (OUTPATIENT)
Dept: PERINATAL CARE | Age: 27
End: 2017-11-15
Payer: MEDICARE

## 2017-11-15 VITALS
TEMPERATURE: 97.8 F | SYSTOLIC BLOOD PRESSURE: 111 MMHG | HEIGHT: 62 IN | RESPIRATION RATE: 16 BRPM | BODY MASS INDEX: 27.97 KG/M2 | WEIGHT: 152 LBS | DIASTOLIC BLOOD PRESSURE: 71 MMHG | HEART RATE: 107 BPM

## 2017-11-15 DIAGNOSIS — O26.879 CERVICAL SHORTENING, ANTEPARTUM CONDITION OR COMPLICATION: Primary | ICD-10-CM

## 2017-11-15 DIAGNOSIS — O28.0 HIGH RISK PREGNANCY WITH LOW PAPPA (PREGNANCY-ASSOCIATED PLASMA PROTEIN A): ICD-10-CM

## 2017-11-15 DIAGNOSIS — O09.212 CURRENT PREGNANCY WITH HISTORY OF PRE-TERM LABOR IN SECOND TRIMESTER: ICD-10-CM

## 2017-11-15 DIAGNOSIS — Z3A.27 27 WEEKS GESTATION OF PREGNANCY: ICD-10-CM

## 2017-11-15 DIAGNOSIS — O26.899 FEELING PELVIC PRESSURE IN PREGNANCY, ANTEPARTUM: ICD-10-CM

## 2017-11-15 DIAGNOSIS — O09.899 HIGH RISK PREGNANCY WITH LOW PAPPA (PREGNANCY-ASSOCIATED PLASMA PROTEIN A): ICD-10-CM

## 2017-11-15 DIAGNOSIS — Z36.4 ANTENATAL SCREENING FOR FETAL GROWTH RETARDATION USING ULTRASONICS: ICD-10-CM

## 2017-11-15 DIAGNOSIS — O34.10 LEIOMYOMA IN PREGNANCY, UTERINE, ANTEPARTUM: ICD-10-CM

## 2017-11-15 DIAGNOSIS — R10.2 FEELING PELVIC PRESSURE IN PREGNANCY, ANTEPARTUM: ICD-10-CM

## 2017-11-15 DIAGNOSIS — D25.9 LEIOMYOMA IN PREGNANCY, UTERINE, ANTEPARTUM: ICD-10-CM

## 2017-11-15 PROCEDURE — 76816 OB US FOLLOW-UP PER FETUS: CPT | Performed by: OBSTETRICS & GYNECOLOGY

## 2017-11-15 PROCEDURE — 76820 UMBILICAL ARTERY ECHO: CPT | Performed by: OBSTETRICS & GYNECOLOGY

## 2017-11-15 PROCEDURE — 76817 TRANSVAGINAL US OBSTETRIC: CPT | Performed by: OBSTETRICS & GYNECOLOGY

## 2017-11-15 NOTE — PATIENT INSTRUCTIONS
Pt to follow up with referring physician    We would be happy to help get you established with one of our [facility] Providers. Please contact our [facility] directly and they will assist you in making an appointment.   Phone: [phone]

## 2017-11-16 ENCOUNTER — HOSPITAL ENCOUNTER (OUTPATIENT)
Age: 27
Setting detail: SPECIMEN
Discharge: HOME | End: 2017-11-16
Payer: MEDICARE

## 2017-11-16 ENCOUNTER — ROUTINE PRENATAL (OUTPATIENT)
Dept: OBGYN CLINIC | Age: 27
End: 2017-11-16
Payer: MEDICARE

## 2017-11-16 VITALS — SYSTOLIC BLOOD PRESSURE: 100 MMHG | BODY MASS INDEX: 27.8 KG/M2 | DIASTOLIC BLOOD PRESSURE: 60 MMHG | WEIGHT: 152 LBS

## 2017-11-16 DIAGNOSIS — O09.899 HIGH RISK PREGNANCY WITH LOW PAPPA (PREGNANCY-ASSOCIATED PLASMA PROTEIN A): ICD-10-CM

## 2017-11-16 DIAGNOSIS — O23.43 UTI (URINARY TRACT INFECTION) DURING PREGNANCY, THIRD TRIMESTER: ICD-10-CM

## 2017-11-16 DIAGNOSIS — O44.42 LOW-LYING PLACENTA IN SECOND TRIMESTER: ICD-10-CM

## 2017-11-16 DIAGNOSIS — O28.0 HIGH RISK PREGNANCY WITH LOW PAPPA (PREGNANCY-ASSOCIATED PLASMA PROTEIN A): ICD-10-CM

## 2017-11-16 DIAGNOSIS — Z3A.27 27 WEEKS GESTATION OF PREGNANCY: ICD-10-CM

## 2017-11-16 DIAGNOSIS — O23.43 UTI (URINARY TRACT INFECTION) DURING PREGNANCY, THIRD TRIMESTER: Primary | ICD-10-CM

## 2017-11-16 DIAGNOSIS — O09.92 HIGH-RISK PREGNANCY, SECOND TRIMESTER: Primary | ICD-10-CM

## 2017-11-16 DIAGNOSIS — O47.00 THREATENED PRETERM LABOR, ANTEPARTUM: ICD-10-CM

## 2017-11-16 LAB
-: ABNORMAL
AMORPHOUS: ABNORMAL
BACTERIA: ABNORMAL
BILIRUBIN URINE: NEGATIVE
CASTS UA: ABNORMAL /LPF (ref 0–2)
COLOR: YELLOW
COMMENT UA: ABNORMAL
CRYSTALS, UA: ABNORMAL /HPF
EPITHELIAL CELLS UA: ABNORMAL /HPF (ref 0–5)
GLUCOSE URINE: NEGATIVE
KETONES, URINE: NEGATIVE
LEUKOCYTE ESTERASE, URINE: ABNORMAL
MUCUS: ABNORMAL
NITRITE, URINE: NEGATIVE
OTHER OBSERVATIONS UA: ABNORMAL
PH UA: 8 (ref 5–8)
PROTEIN UA: NEGATIVE
RBC UA: ABNORMAL /HPF (ref 0–2)
RENAL EPITHELIAL, UA: ABNORMAL /HPF
SPECIFIC GRAVITY UA: 1.01 (ref 1–1.03)
TRICHOMONAS: ABNORMAL
TURBIDITY: CLEAR
URINE HGB: ABNORMAL
UROBILINOGEN, URINE: NORMAL
WBC UA: ABNORMAL /HPF (ref 0–5)
YEAST: ABNORMAL

## 2017-11-16 PROCEDURE — G8417 CALC BMI ABV UP PARAM F/U: HCPCS | Performed by: OBSTETRICS & GYNECOLOGY

## 2017-11-16 PROCEDURE — 99213 OFFICE O/P EST LOW 20 MIN: CPT | Performed by: OBSTETRICS & GYNECOLOGY

## 2017-11-16 PROCEDURE — G8428 CUR MEDS NOT DOCUMENT: HCPCS | Performed by: OBSTETRICS & GYNECOLOGY

## 2017-11-16 PROCEDURE — G8484 FLU IMMUNIZE NO ADMIN: HCPCS | Performed by: OBSTETRICS & GYNECOLOGY

## 2017-11-16 PROCEDURE — 1036F TOBACCO NON-USER: CPT | Performed by: OBSTETRICS & GYNECOLOGY

## 2017-11-16 RX ORDER — CEPHALEXIN 500 MG/1
500 CAPSULE ORAL 4 TIMES DAILY
Qty: 28 CAPSULE | Refills: 0 | Status: SHIPPED | OUTPATIENT
Start: 2017-11-16 | End: 2017-11-29 | Stop reason: ALTCHOICE

## 2017-11-16 NOTE — PROGRESS NOTES
U/A still shows 5-10 white cells, so I suggest we treat urine again. I will call in for Keflex 500 q.i.d.

## 2017-11-16 NOTE — PROGRESS NOTES
+FM, -Ctx, -LOF, -VB  Patient Active Problem List   Diagnosis    ASCUS (atypical squamous cells of undetermined significance) on Pap smear    Herpes simplex type 2 infection    H/O PTD (G1 @ 36wks)    Low TORI-A    Leiomyoma in pregnancy, uterine, antepartum    Low-lying placenta in second trimester    Cervical shortening, antepartum condition or complication    Rh+/RI/GBS unk     contractions    Shortened cervix    Uterine fibroids    S/p Celestone 10/31,     Threatened  labor, antepartum    Current pregnancy with history of pre-term labor in second trimester    High-risk pregnancy, second trimester    Feeling pelvic pressure in pregnancy, antepartum     Blood pressure 100/60, weight 152 lb (68.9 kg), last menstrual period 2017, not currently breastfeeding. The patient saw [facility] yesterday and was told she has another fibroid. She was bryanna the past 2 days, but better today.   I suggest that we try to get her on an alternating schedule of seeing M 1 week and here the other week  I ordered a urinalysis, since she had a UTI and I wanted test of cure

## 2017-11-17 LAB
CULTURE: NORMAL
CULTURE: NORMAL
Lab: NORMAL
SPECIMEN DESCRIPTION: NORMAL
STATUS: NORMAL

## 2017-11-22 ENCOUNTER — TELEPHONE (OUTPATIENT)
Dept: OBGYN | Age: 27
End: 2017-11-22

## 2017-11-22 ENCOUNTER — HOSPITAL ENCOUNTER (OUTPATIENT)
Age: 27
Discharge: HOME | End: 2017-11-22
Attending: OBSTETRICS & GYNECOLOGY | Admitting: OBSTETRICS & GYNECOLOGY
Payer: MEDICARE

## 2017-11-22 VITALS
DIASTOLIC BLOOD PRESSURE: 68 MMHG | RESPIRATION RATE: 18 BRPM | SYSTOLIC BLOOD PRESSURE: 108 MMHG | HEART RATE: 111 BPM | TEMPERATURE: 98.1 F

## 2017-11-22 PROBLEM — Z33.1 IUP (INTRAUTERINE PREGNANCY), INCIDENTAL: Status: ACTIVE | Noted: 2017-11-22

## 2017-11-22 LAB
ABSOLUTE EOS #: 0.09 K/UL (ref 0–0.44)
ABSOLUTE IMMATURE GRANULOCYTE: 0.07 K/UL (ref 0–0.3)
ABSOLUTE LYMPH #: 2.63 K/UL (ref 1.1–3.7)
ABSOLUTE MONO #: 0.81 K/UL (ref 0.1–1.2)
BASOPHILS # BLD: 0 % (ref 0–2)
BASOPHILS ABSOLUTE: 0.03 K/UL (ref 0–0.2)
DIFFERENTIAL TYPE: ABNORMAL
EOSINOPHILS RELATIVE PERCENT: 1 % (ref 1–4)
FIBRINOGEN: 394 MG/DL (ref 140–420)
HCT VFR BLD CALC: 39.4 % (ref 36.3–47.1)
HEMOGLOBIN: 12.4 G/DL (ref 11.9–15.1)
IMMATURE GRANULOCYTES: 1 %
LYMPHOCYTES # BLD: 18 % (ref 24–43)
MCH RBC QN AUTO: 26.5 PG (ref 25.2–33.5)
MCHC RBC AUTO-ENTMCNC: 31.5 G/DL (ref 28.4–34.8)
MCV RBC AUTO: 84.2 FL (ref 82.6–102.9)
MONOCYTES # BLD: 6 % (ref 3–12)
PDW BLD-RTO: 12.2 % (ref 11.8–14.4)
PLATELET # BLD: 266 K/UL (ref 138–453)
PLATELET ESTIMATE: ABNORMAL
PMV BLD AUTO: 10.6 FL (ref 8.1–13.5)
RBC # BLD: 4.68 M/UL (ref 3.95–5.11)
RBC # BLD: ABNORMAL 10*6/UL
SEG NEUTROPHILS: 74 % (ref 36–65)
SEGMENTED NEUTROPHILS ABSOLUTE COUNT: 10.86 K/UL (ref 1.5–8.1)
WBC # BLD: 14.5 K/UL (ref 3.5–11.3)
WBC # BLD: ABNORMAL 10*3/UL

## 2017-11-22 PROCEDURE — 99213 OFFICE O/P EST LOW 20 MIN: CPT

## 2017-11-22 PROCEDURE — 85384 FIBRINOGEN ACTIVITY: CPT

## 2017-11-22 PROCEDURE — 36415 COLL VENOUS BLD VENIPUNCTURE: CPT

## 2017-11-22 PROCEDURE — 85025 COMPLETE CBC W/AUTO DIFF WBC: CPT

## 2017-11-22 RX ORDER — ONDANSETRON 2 MG/ML
4 INJECTION INTRAMUSCULAR; INTRAVENOUS EVERY 6 HOURS PRN
Status: DISCONTINUED | OUTPATIENT
Start: 2017-11-22 | End: 2017-11-23 | Stop reason: HOSPADM

## 2017-11-22 RX ORDER — ACETAMINOPHEN 500 MG
1000 TABLET ORAL EVERY 6 HOURS PRN
Status: DISCONTINUED | OUTPATIENT
Start: 2017-11-22 | End: 2017-11-23 | Stop reason: HOSPADM

## 2017-11-22 RX ORDER — SODIUM CHLORIDE 0.9 % (FLUSH) 0.9 %
10 SYRINGE (ML) INJECTION PRN
Status: DISCONTINUED | OUTPATIENT
Start: 2017-11-22 | End: 2017-11-23 | Stop reason: HOSPADM

## 2017-11-22 NOTE — H&P
OBSTETRICAL HISTORY [facility]    Date: 2017       Time: 9:42 PM   Patient Name: Edmundo Gill     Patient : 1990  Room/Bed: 2473/4525-16    Admission Date/Time: 2017  5:41 PM      CC: post coital vaginal bleeding     HPI: Edmundo Gill is a 31 y.o.  at 28w1d who presents with vaginal bleeding that began at approximately 1500 today after intercourse. States the bleeding was similar to the last day of a period. She has not noticed additional bleeding since arrival.  Notes a minimal amount of dark red blood when wiping after urination. Denies discharge. Did not soak through her underwear. Denies contractions or LOF. Reports good fetal movement. Patient denies any headache, visual changes, difficulty breathing, RUQ pain, N/V, F/C, and pain/swelling in lower extremities. Pregnancy is complicated by hx of PTD, shortened cervix, HSV2 (denies current outbreak or prodromal syndromes), uterine fibroids, low PAPPA. DATING:  LMP: Patient's last menstrual period was 2017.   Estimated Date of Delivery: 18   Based on: LMP     PREGNANCY RISK FACTORS:  Patient Active Problem List   Diagnosis    ASCUS (atypical squamous cells of undetermined significance) on Pap smear    Herpes simplex type 2 infection    H/O PTD (G1 @ 36wks)    Low TORI-A    Leiomyoma in pregnancy, uterine, antepartum    Low-lying placenta in second trimester    Cervical shortening, antepartum condition or complication    Rh+/RI/GBS unk     contractions    Shortened cervix    Uterine fibroids    S/p Celestone 10/31,     Threatened  labor, antepartum    Current pregnancy with history of pre-term labor in second trimester    High-risk pregnancy, second trimester    Feeling pelvic pressure in pregnancy, antepartum    IUP (intrauterine pregnancy), incidental        Steroids Given In This Pregnancy:  yes, date: 10/31 and  Prenatal Vit-Fe Fumarate-FA (RAFY-RUL PRENATAL VITAMINS) 28-0.8 MG TABS Take 1 tablet by mouth daily 6/21/17   Gilmer Varela MD       FAMILY HISTORY:  family history includes Asthma in her sister; Cancer in her maternal grandfather; Dementia in her maternal grandmother; High Cholesterol in her mother; No Known Problems in her father and paternal grandfather; Other in her mother and paternal grandmother; Thyroid Cancer in her mother; Thyroid Disease in her maternal grandmother. SOCIAL HISTORY:   reports that she has never smoked. She has never used smokeless tobacco. She reports that she does not drink alcohol or use drugs.     VITALS:  Vitals:    11/22/17 1759   BP: 108/68   Pulse: 111   Resp: 18   Temp: 98.1 °F (36.7 °C)   TempSrc: Oral         PHYSICAL EXAM:  Fetal Heart Monitor:  Baseline Heart Rate 145, moderate variability, present accelerations, absent decelerations  Geary: contractions, none    General appearance:  no apparent distress, alert and cooperative  Neurologic:  alert, oriented, normal speech, no focal findings or movement disorder noted  Lungs:  No increased work of breathing, good air exchange, clear to auscultation bilaterally, no crackles or wheezing  Heart:  regular rate and rhythm and no murmur    Abdomen:  soft, gravid, non-tender, no right upper quadrant tenderness, no CVA tenderness, uterus non-tender, no signs of abruption and no signs of chorioamnionitis  Extremities:  no calf tenderness, non edematous, DTRs: normal    Pelvic Exam:   Speculum: No external lesions or erythema, normal appearing vaginal mucosa that is pink and moist, minimal to moderate amount of dark red blood noted in the vaginal canal, no blood noted at the cervical os, small amount of thin physiologic discharge in vault, cervix visually closed and long without lesions or erythema      PRENATAL LAB RESULTS:   Blood Type/Rh: O pos  Antibody Screen: negative  Hemoglobin, Hematocrit, Platelets: 00.5 / 18.4 / 290  Rubella: immune  T. Pallidum, IgG: non-reactive   Hepatitis B Surface Antigen: non-reactive   HIV: non-reactive   Sickle Cell Screen: negative  Gonorrhea: negative  Chlamydia: negative  Urine culture: negative, date: 17     1 hour Glucose Tolerance Test: not done  3 hour Glucose Tolerance Test: N/A     Group B Strep: not done  Cystic Fibrosis Screen: negative  First Trimester Screen: low TORI-A  MSAFP/Multiple Markers: normal  Non-Invasive Prenatal Testing: no aneuploidy detected  Anatomy US: normal male anatomy, anterior placenta, 3 VC, multiple uterine fibroids, cervix slightly shortened (< 30 mm, > 25 mm)    ASSESSMENT & PLAN:  Fredy Cook is a 31 y.o. female  at 28w1d IUP    - GBS unknown / Rh positive / R immune   - No indication for GBS prophylaxis    Vaginal bleeding    - Patient admits to intercourse at 1500, immediately prior to onset of bleeding    - Denies current bleeding    - SSE: dark red blood noted in the vault, no evidence of current bleeding, no blood at os, cervix closed and long    - Category 1 tracing, TOCO quiet     Hx of PTD    - G1 at 36w2d   - Follows with MFM for TVUS q2w    - Progesterone suppositories 200mg     Shortened cervix    - 27mm on anatomy US     Low PAPPA    - NIPT wnl     Low lying placenta       Uterine fibroids     Okay to be discharged home. Patient counseled on si/sx of  labor and preeclampsia, voices understanding that she is to return to labor and delivery if these occur. Patient Active Problem List    Diagnosis Date Noted    IUP (intrauterine pregnancy), incidental 2017    Feeling pelvic pressure in pregnancy, antepartum 11/15/2017    High-risk pregnancy, second trimester 2017    Rh+/RI/GBS unk 10/31/2017     PRENATAL LAB RESULTS:   Blood Type/Rh: O pos  Antibody Screen: negative  Hemoglobin, Hematocrit, Platelets: 30.7 / 34.2 / 290  Rubella: immune  T.  Pallidum, IgG: non-reactive   Hepatitis B Surface Antigen: non-reactive HIV: non-reactive   Sickle Cell Screen: negative  Gonorrhea: negative  Chlamydia: negative  Urine culture: negative, date: 17     1 hour Glucose Tolerance Test: not done  3 hour Glucose Tolerance Test: N/A     Group B Strep: not done  Cystic Fibrosis Screen: negative  First Trimester Screen: low TORI-A  MSAFP/Multiple Markers: single AFP normal  Non-Invasive Prenatal Testing: no aneuploidy detected  Anatomy US: normal male anatomy, anterior placenta, 3 VC, multiple uterine fibroids, cervix slightly shortened (< 30 mm, > 25 mm)       contractions 10/31/2017    Shortened cervix 10/31/2017    Uterine fibroids 10/31/2017    S/p Celestone 10/31, 11/1 10/31/2017     Celestone 10/31/17, next on 17 @ 0200      Threatened  labor, antepartum     Current pregnancy with history of pre-term labor in second trimester     Cervical shortening, antepartum condition or complication 564    Low-lying placenta in second trimester 2017     14.2 mm from internal os on anatomy scan 17.  Low TORI-A 2017     Growth scans q4-6 wks for the duration of pregnancy.  testing starting at 32 weeks (twice weekly NST, weekly GELA/BPP).  Leiomyoma in pregnancy, uterine, antepartum 2017    H/O PTD (G1 @ 36wks) 2017     U/S q 2 weeks from 16-34 weeks, [place] until 36.6 weeks      Herpes simplex type 2 infection 2015    ASCUS (atypical squamous cells of undetermined significance) on Pap smear        Plan discussed with Dr. Eric Emanuel, who is agreeable. Steroids given this admission: No    Risks, benefits, alternatives and possible complications have been discussed in detail with the patient. Admission, and post admission procedures and expectations were discussed in detail. All questions were answered.     Attending's Name: Dr. Evert Drake  Ob/Gyn Resident  2017, 9:42 PM           Attending Physician Statement  I have discussed the care

## 2017-11-29 ENCOUNTER — ROUTINE PRENATAL (OUTPATIENT)
Dept: PERINATAL CARE | Age: 27
End: 2017-11-29
Payer: MEDICARE

## 2017-11-29 VITALS
HEART RATE: 98 BPM | RESPIRATION RATE: 16 BRPM | HEIGHT: 62 IN | DIASTOLIC BLOOD PRESSURE: 67 MMHG | WEIGHT: 154.5 LBS | SYSTOLIC BLOOD PRESSURE: 105 MMHG | TEMPERATURE: 98.3 F | BODY MASS INDEX: 28.43 KG/M2

## 2017-11-29 DIAGNOSIS — O09.213 CURRENT PREGNANCY WITH HISTORY OF PRE-TERM LABOR IN THIRD TRIMESTER: ICD-10-CM

## 2017-11-29 DIAGNOSIS — Z3A.29 29 WEEKS GESTATION OF PREGNANCY: ICD-10-CM

## 2017-11-29 DIAGNOSIS — O46.93 VAGINAL BLEEDING IN PREGNANCY, THIRD TRIMESTER: Primary | ICD-10-CM

## 2017-11-29 DIAGNOSIS — O28.0 HIGH RISK PREGNANCY WITH LOW PAPPA (PREGNANCY-ASSOCIATED PLASMA PROTEIN A): ICD-10-CM

## 2017-11-29 DIAGNOSIS — O26.879 CERVICAL SHORTENING, ANTEPARTUM CONDITION OR COMPLICATION: ICD-10-CM

## 2017-11-29 DIAGNOSIS — O34.10 LEIOMYOMA IN PREGNANCY, UTERINE, ANTEPARTUM: ICD-10-CM

## 2017-11-29 DIAGNOSIS — O09.899 HIGH RISK PREGNANCY WITH LOW PAPPA (PREGNANCY-ASSOCIATED PLASMA PROTEIN A): ICD-10-CM

## 2017-11-29 DIAGNOSIS — D25.9 LEIOMYOMA IN PREGNANCY, UTERINE, ANTEPARTUM: ICD-10-CM

## 2017-11-29 PROCEDURE — 76815 OB US LIMITED FETUS(S): CPT | Performed by: OBSTETRICS & GYNECOLOGY

## 2017-11-29 PROCEDURE — 76820 UMBILICAL ARTERY ECHO: CPT | Performed by: OBSTETRICS & GYNECOLOGY

## 2017-11-29 PROCEDURE — 76817 TRANSVAGINAL US OBSTETRIC: CPT | Performed by: OBSTETRICS & GYNECOLOGY

## 2017-11-29 PROCEDURE — 76819 FETAL BIOPHYS PROFIL W/O NST: CPT | Performed by: OBSTETRICS & GYNECOLOGY

## 2017-12-08 ENCOUNTER — ROUTINE PRENATAL (OUTPATIENT)
Dept: OBGYN CLINIC | Age: 27
End: 2017-12-08
Payer: MEDICARE

## 2017-12-08 VITALS — BODY MASS INDEX: 28.79 KG/M2 | WEIGHT: 157.4 LBS | DIASTOLIC BLOOD PRESSURE: 60 MMHG | SYSTOLIC BLOOD PRESSURE: 100 MMHG

## 2017-12-08 DIAGNOSIS — O47.00 THREATENED PRETERM LABOR, ANTEPARTUM: ICD-10-CM

## 2017-12-08 DIAGNOSIS — Z3A.30 30 WEEKS GESTATION OF PREGNANCY: ICD-10-CM

## 2017-12-08 DIAGNOSIS — O44.42 LOW-LYING PLACENTA IN SECOND TRIMESTER: ICD-10-CM

## 2017-12-08 DIAGNOSIS — O09.93 HIGH-RISK PREGNANCY, THIRD TRIMESTER: Primary | ICD-10-CM

## 2017-12-08 DIAGNOSIS — O46.93 VAGINAL BLEEDING IN PREGNANCY, THIRD TRIMESTER: ICD-10-CM

## 2017-12-08 PROCEDURE — G8427 DOCREV CUR MEDS BY ELIG CLIN: HCPCS | Performed by: OBSTETRICS & GYNECOLOGY

## 2017-12-08 PROCEDURE — 99213 OFFICE O/P EST LOW 20 MIN: CPT | Performed by: OBSTETRICS & GYNECOLOGY

## 2017-12-08 PROCEDURE — G8417 CALC BMI ABV UP PARAM F/U: HCPCS | Performed by: OBSTETRICS & GYNECOLOGY

## 2017-12-08 PROCEDURE — 1036F TOBACCO NON-USER: CPT | Performed by: OBSTETRICS & GYNECOLOGY

## 2017-12-08 PROCEDURE — G8484 FLU IMMUNIZE NO ADMIN: HCPCS | Performed by: OBSTETRICS & GYNECOLOGY

## 2017-12-12 ENCOUNTER — HOSPITAL ENCOUNTER (EMERGENCY)
Age: 27
Discharge: HOME | End: 2017-12-12
Attending: EMERGENCY MEDICINE
Payer: OTHER MISCELLANEOUS

## 2017-12-12 VITALS
BODY MASS INDEX: 28.72 KG/M2 | HEART RATE: 96 BPM | RESPIRATION RATE: 15 BRPM | DIASTOLIC BLOOD PRESSURE: 77 MMHG | OXYGEN SATURATION: 98 % | TEMPERATURE: 97 F | WEIGHT: 157 LBS | SYSTOLIC BLOOD PRESSURE: 119 MMHG
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
## 2017-12-12 DIAGNOSIS — Z3A.31 31 WEEKS GESTATION OF PREGNANCY: ICD-10-CM

## 2017-12-12 DIAGNOSIS — M62.838 CERVICAL PARASPINAL MUSCLE SPASM: ICD-10-CM

## 2017-12-12 PROCEDURE — 6370000000 HC RX 637 (ALT 250 FOR IP): Performed by: EMERGENCY MEDICINE

## 2017-12-12 PROCEDURE — 99284 EMERGENCY DEPT VISIT MOD MDM: CPT

## 2017-12-12 RX ORDER — ACETAMINOPHEN 325 MG/1
650 TABLET ORAL ONCE
Status: COMPLETED | OUTPATIENT
Start: 2017-12-12 | End: 2017-12-12

## 2017-12-12 RX ADMIN — ACETAMINOPHEN 650 MG: 325 TABLET ORAL at 10:50

## 2017-12-12 ASSESSMENT — ENCOUNTER SYMPTOMS
VOMITING: 0
CONSTIPATION: 0
BACK PAIN: 0
ABDOMINAL PAIN: 1
DIARRHEA: 0
SHORTNESS OF BREATH: 0
NAUSEA: 0

## 2017-12-12 ASSESSMENT — PAIN SCALES - GENERAL: PAINLEVEL_OUTOF10: 6

## 2017-12-12 ASSESSMENT — PAIN DESCRIPTION - PAIN TYPE: TYPE: ACUTE PAIN

## 2017-12-12 ASSESSMENT — PAIN DESCRIPTION - LOCATION: LOCATION: ABDOMEN

## 2017-12-12 NOTE — ED NOTES
Dr Kelsey Francisco at bedside at this time speaking with patient     Tamia Fregoso RN  12/12/17 [id number]
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
## 2017-12-12 NOTE — ED NOTES
Discharge completed with patient. Med teaching completed. Home care instructions reviewed with patient. Patient denies any further questions at this time. Verbalizes understanding of all treatment and teaching. Ambulatory for discharge. Patient is stable, non distressed.         Britta Walters RN  12/12/17 0331
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
## 2017-12-13 ENCOUNTER — ROUTINE PRENATAL (OUTPATIENT)
Dept: PERINATAL CARE | Age: 27
End: 2017-12-13
Payer: MEDICARE

## 2017-12-13 VITALS
HEART RATE: 101 BPM | BODY MASS INDEX: 27.98 KG/M2 | SYSTOLIC BLOOD PRESSURE: 101 MMHG | DIASTOLIC BLOOD PRESSURE: 65 MMHG | WEIGHT: 153 LBS

## 2017-12-13 DIAGNOSIS — Z13.89 ENCOUNTER FOR ROUTINE SCREENING FOR MALFORMATION USING ULTRASONICS: ICD-10-CM

## 2017-12-13 DIAGNOSIS — O09.899 HIGH RISK PREGNANCY WITH LOW PAPPA (PREGNANCY-ASSOCIATED PLASMA PROTEIN A): ICD-10-CM

## 2017-12-13 DIAGNOSIS — Z3A.31 31 WEEKS GESTATION OF PREGNANCY: ICD-10-CM

## 2017-12-13 DIAGNOSIS — O09.213 CURRENT PREGNANCY WITH HISTORY OF PRE-TERM LABOR IN THIRD TRIMESTER: Primary | ICD-10-CM

## 2017-12-13 DIAGNOSIS — O26.879 CERVICAL SHORTENING, ANTEPARTUM CONDITION OR COMPLICATION: ICD-10-CM

## 2017-12-13 DIAGNOSIS — O46.93 VAGINAL BLEEDING IN PREGNANCY, THIRD TRIMESTER: ICD-10-CM

## 2017-12-13 DIAGNOSIS — O28.0 HIGH RISK PREGNANCY WITH LOW PAPPA (PREGNANCY-ASSOCIATED PLASMA PROTEIN A): ICD-10-CM

## 2017-12-13 DIAGNOSIS — D25.9 LEIOMYOMA IN PREGNANCY, UTERINE, ANTEPARTUM: ICD-10-CM

## 2017-12-13 DIAGNOSIS — O34.10 LEIOMYOMA IN PREGNANCY, UTERINE, ANTEPARTUM: ICD-10-CM

## 2017-12-13 DIAGNOSIS — Z36.4 ANTENATAL SCREENING FOR FETAL GROWTH RETARDATION USING ULTRASONICS: ICD-10-CM

## 2017-12-13 PROCEDURE — 76820 UMBILICAL ARTERY ECHO: CPT | Performed by: OBSTETRICS & GYNECOLOGY

## 2017-12-13 PROCEDURE — 76805 OB US >/= 14 WKS SNGL FETUS: CPT | Performed by: OBSTETRICS & GYNECOLOGY

## 2017-12-13 PROCEDURE — 76817 TRANSVAGINAL US OBSTETRIC: CPT | Performed by: OBSTETRICS & GYNECOLOGY

## 2017-12-13 PROCEDURE — 76819 FETAL BIOPHYS PROFIL W/O NST: CPT | Performed by: OBSTETRICS & GYNECOLOGY

## 2017-12-13 NOTE — PATIENT INSTRUCTIONS
Pt to follow up with referring physician    We would be happy to help get you established with one of our [facility] Providers. Please contact our [facility] directly and they will assist you in making an appointment.   Phone: [phone]

## 2017-12-22 ENCOUNTER — ROUTINE PRENATAL (OUTPATIENT)
Dept: OBGYN CLINIC | Age: 27
End: 2017-12-22
Payer: MEDICARE

## 2017-12-22 VITALS — WEIGHT: 158 LBS | DIASTOLIC BLOOD PRESSURE: 70 MMHG | SYSTOLIC BLOOD PRESSURE: 104 MMHG | BODY MASS INDEX: 28.9 KG/M2

## 2017-12-22 DIAGNOSIS — O09.90 HIGH RISK PREGNANCY, ANTEPARTUM: ICD-10-CM

## 2017-12-22 DIAGNOSIS — O26.873 CERVICAL SHORTENING IN THIRD TRIMESTER: ICD-10-CM

## 2017-12-22 DIAGNOSIS — O09.212 CURRENT PREGNANCY WITH HISTORY OF PRE-TERM LABOR IN SECOND TRIMESTER: ICD-10-CM

## 2017-12-22 DIAGNOSIS — O44.42 LOW-LYING PLACENTA IN SECOND TRIMESTER: ICD-10-CM

## 2017-12-22 DIAGNOSIS — O09.93 HIGH-RISK PREGNANCY, THIRD TRIMESTER: Primary | ICD-10-CM

## 2017-12-22 DIAGNOSIS — Z3A.32 32 WEEKS GESTATION OF PREGNANCY: ICD-10-CM

## 2017-12-22 PROCEDURE — 99213 OFFICE O/P EST LOW 20 MIN: CPT | Performed by: OBSTETRICS & GYNECOLOGY

## 2017-12-22 PROCEDURE — G8428 CUR MEDS NOT DOCUMENT: HCPCS | Performed by: OBSTETRICS & GYNECOLOGY

## 2017-12-22 PROCEDURE — G8484 FLU IMMUNIZE NO ADMIN: HCPCS | Performed by: OBSTETRICS & GYNECOLOGY

## 2017-12-22 PROCEDURE — 59025 FETAL NON-STRESS TEST: CPT | Performed by: OBSTETRICS & GYNECOLOGY

## 2017-12-22 PROCEDURE — 1036F TOBACCO NON-USER: CPT | Performed by: OBSTETRICS & GYNECOLOGY

## 2017-12-22 PROCEDURE — G8417 CALC BMI ABV UP PARAM F/U: HCPCS | Performed by: OBSTETRICS & GYNECOLOGY

## 2017-12-22 NOTE — PROGRESS NOTES
+FM, -Ctx, -LOF, -VB  Patient Active Problem List   Diagnosis    ASCUS (atypical squamous cells of undetermined significance) on Pap smear    Herpes simplex type 2 infection    H/O PTD (G1 @ 36wks)    Low TORI-A    Leiomyoma in pregnancy, uterine, antepartum    Low-lying placenta in second trimester    Cervical shortening, antepartum condition or complication    Rh+/RI/GBS unk     contractions    Shortened cervix    Uterine fibroids    S/p Celestone 10/31,     Threatened  labor, antepartum    Current pregnancy with history of pre-term labor in second trimester    High-risk pregnancy, second trimester    Feeling pelvic pressure in pregnancy, antepartum    IUP (intrauterine pregnancy), incidental    Current pregnancy with history of pre-term labor in third trimester    Vaginal bleeding in pregnancy, third trimester     Blood pressure 104/70, weight 158 lb (71.7 kg), last menstrual period 2017, not currently breastfeeding.   The patient is to begin her nonstress tests at 32 weeks, so we will start it today and see her here weekly, to alternate with her BPP's at [facility]  She is to start Valtrex at 36 weeks

## 2017-12-30 ENCOUNTER — HOSPITAL ENCOUNTER (OUTPATIENT)
Dept: LABOR AND DELIVERY | Age: 27
Discharge: HOME | End: 2017-12-30
Attending: OBSTETRICS & GYNECOLOGY | Admitting: OBSTETRICS & GYNECOLOGY
Payer: MEDICARE

## 2017-12-30 VITALS
SYSTOLIC BLOOD PRESSURE: 119 MMHG | RESPIRATION RATE: 16 BRPM | HEART RATE: 108 BPM | DIASTOLIC BLOOD PRESSURE: 79 MMHG | TEMPERATURE: 98.7 F

## 2017-12-30 PROBLEM — Z33.1 IUP (INTRAUTERINE PREGNANCY), INCIDENTAL: Status: RESOLVED | Noted: 2017-11-22 | Resolved: 2017-12-30

## 2017-12-30 PROBLEM — Z3A.33 33 WEEKS GESTATION OF PREGNANCY: Status: ACTIVE | Noted: 2017-12-30

## 2017-12-30 PROCEDURE — 59025 FETAL NON-STRESS TEST: CPT

## 2017-12-30 PROCEDURE — 59025 FETAL NON-STRESS TEST: CPT | Performed by: OBSTETRICS & GYNECOLOGY

## 2018-01-04 ENCOUNTER — ROUTINE PRENATAL (OUTPATIENT)
Dept: OBGYN CLINIC | Age: 28
End: 2018-01-04
Payer: MEDICARE

## 2018-01-04 VITALS — DIASTOLIC BLOOD PRESSURE: 76 MMHG | SYSTOLIC BLOOD PRESSURE: 106 MMHG | WEIGHT: 158 LBS | BODY MASS INDEX: 28.9 KG/M2

## 2018-01-04 DIAGNOSIS — Z3A.34 34 WEEKS GESTATION OF PREGNANCY: ICD-10-CM

## 2018-01-04 DIAGNOSIS — O44.42 LOW-LYING PLACENTA IN SECOND TRIMESTER: ICD-10-CM

## 2018-01-04 DIAGNOSIS — O09.93 HIGH-RISK PREGNANCY, THIRD TRIMESTER: Primary | ICD-10-CM

## 2018-01-04 PROCEDURE — G8484 FLU IMMUNIZE NO ADMIN: HCPCS | Performed by: OBSTETRICS & GYNECOLOGY

## 2018-01-04 PROCEDURE — 1036F TOBACCO NON-USER: CPT | Performed by: OBSTETRICS & GYNECOLOGY

## 2018-01-04 PROCEDURE — G8427 DOCREV CUR MEDS BY ELIG CLIN: HCPCS | Performed by: OBSTETRICS & GYNECOLOGY

## 2018-01-04 PROCEDURE — G8417 CALC BMI ABV UP PARAM F/U: HCPCS | Performed by: OBSTETRICS & GYNECOLOGY

## 2018-01-04 PROCEDURE — 99213 OFFICE O/P EST LOW 20 MIN: CPT | Performed by: OBSTETRICS & GYNECOLOGY

## 2018-01-05 ENCOUNTER — ROUTINE PRENATAL (OUTPATIENT)
Dept: PERINATAL CARE | Age: 28
DRG: 560 | End: 2018-01-05
Payer: MEDICARE

## 2018-01-05 VITALS
HEART RATE: 99 BPM | DIASTOLIC BLOOD PRESSURE: 74 MMHG | WEIGHT: 157 LBS | SYSTOLIC BLOOD PRESSURE: 111 MMHG | BODY MASS INDEX: 28.89 KG/M2 | HEIGHT: 62 IN | RESPIRATION RATE: 16 BRPM | TEMPERATURE: 98.7 F

## 2018-01-05 DIAGNOSIS — O28.0 HIGH RISK PREGNANCY WITH LOW PAPPA (PREGNANCY-ASSOCIATED PLASMA PROTEIN A): Primary | ICD-10-CM

## 2018-01-05 DIAGNOSIS — Z3A.34 34 WEEKS GESTATION OF PREGNANCY: ICD-10-CM

## 2018-01-05 DIAGNOSIS — O09.899 HIGH RISK PREGNANCY WITH LOW PAPPA (PREGNANCY-ASSOCIATED PLASMA PROTEIN A): Primary | ICD-10-CM

## 2018-01-05 DIAGNOSIS — Z36.4 ANTENATAL SCREENING FOR FETAL GROWTH RETARDATION USING ULTRASONICS: ICD-10-CM

## 2018-01-05 DIAGNOSIS — O36.5930 POOR FETAL GROWTH AFFECTING MANAGEMENT OF MOTHER IN THIRD TRIMESTER, SINGLE OR UNSPECIFIED FETUS: ICD-10-CM

## 2018-01-05 PROCEDURE — 76821 MIDDLE CEREBRAL ARTERY ECHO: CPT | Performed by: OBSTETRICS & GYNECOLOGY

## 2018-01-05 PROCEDURE — 76820 UMBILICAL ARTERY ECHO: CPT | Performed by: OBSTETRICS & GYNECOLOGY

## 2018-01-05 PROCEDURE — 76818 FETAL BIOPHYS PROFILE W/NST: CPT | Performed by: OBSTETRICS & GYNECOLOGY

## 2018-01-05 PROCEDURE — 76816 OB US FOLLOW-UP PER FETUS: CPT | Performed by: OBSTETRICS & GYNECOLOGY

## 2018-01-06 ENCOUNTER — HOSPITAL ENCOUNTER (INPATIENT)
Age: 28
LOS: 2 days | Discharge: HOME | DRG: 560 | End: 2018-01-08
Attending: OBSTETRICS & GYNECOLOGY | Admitting: OBSTETRICS & GYNECOLOGY
Payer: MEDICARE

## 2018-01-06 PROBLEM — R82.71 GBS BACTERIURIA: Status: ACTIVE | Noted: 2018-01-06

## 2018-01-06 PROBLEM — O46.93 VAGINAL BLEEDING IN PREGNANCY, THIRD TRIMESTER: Status: RESOLVED | Noted: 2017-11-29 | Resolved: 2018-01-06

## 2018-01-06 PROBLEM — O60.00 PRETERM LABOR: Status: ACTIVE | Noted: 2018-01-06

## 2018-01-06 PROBLEM — O09.90 HRP (HIGH RISK PREGNANCY), UNSPECIFIED TRIMESTER: Status: ACTIVE | Noted: 2018-01-06

## 2018-01-06 LAB
-: ABNORMAL
ABO/RH: NORMAL
ABSOLUTE EOS #: 0.04 K/UL (ref 0–0.44)
ABSOLUTE IMMATURE GRANULOCYTE: 0.1 K/UL (ref 0–0.3)
ABSOLUTE LYMPH #: 2.93 K/UL (ref 1.1–3.7)
ABSOLUTE MONO #: 1.2 K/UL (ref 0.1–1.2)
AMORPHOUS: ABNORMAL
AMPHETAMINE SCREEN URINE: NEGATIVE
ANTIBODY SCREEN: NEGATIVE
ARM BAND NUMBER: NORMAL
BACTERIA: ABNORMAL
BARBITURATE SCREEN URINE: NEGATIVE
BASOPHILS # BLD: 0 % (ref 0–2)
BASOPHILS ABSOLUTE: 0.05 K/UL (ref 0–0.2)
BENZODIAZEPINE SCREEN, URINE: NEGATIVE
BILIRUBIN URINE: NEGATIVE
BUPRENORPHINE URINE: NORMAL
CANNABINOID SCREEN URINE: NEGATIVE
CASTS UA: ABNORMAL /LPF (ref 0–2)
COCAINE METABOLITE, URINE: NEGATIVE
COLOR: YELLOW
COMMENT UA: ABNORMAL
CRYSTALS, UA: ABNORMAL /HPF
DIFFERENTIAL TYPE: ABNORMAL
EOSINOPHILS RELATIVE PERCENT: 0 % (ref 1–4)
EPITHELIAL CELLS UA: ABNORMAL /HPF (ref 0–5)
EXPIRATION DATE: NORMAL
GLUCOSE URINE: NEGATIVE
HCT VFR BLD CALC: 42.7 % (ref 36.3–47.1)
HEMOGLOBIN: 13.9 G/DL (ref 11.9–15.1)
IMMATURE GRANULOCYTES: 1 %
KETONES, URINE: ABNORMAL
LEUKOCYTE ESTERASE, URINE: ABNORMAL
LYMPHOCYTES # BLD: 17 % (ref 24–43)
MCH RBC QN AUTO: 27.3 PG (ref 25.2–33.5)
MCHC RBC AUTO-ENTMCNC: 32.6 G/DL (ref 28.4–34.8)
MCV RBC AUTO: 83.9 FL (ref 82.6–102.9)
MDMA URINE: NORMAL
METHADONE SCREEN, URINE: NEGATIVE
METHAMPHETAMINE, URINE: NORMAL
MONOCYTES # BLD: 7 % (ref 3–12)
MUCUS: ABNORMAL
NITRITE, URINE: NEGATIVE
OPIATES, URINE: NEGATIVE
OTHER OBSERVATIONS UA: ABNORMAL
OXYCODONE SCREEN URINE: NEGATIVE
PDW BLD-RTO: 12.7 % (ref 11.8–14.4)
PH UA: 7 (ref 5–8)
PHENCYCLIDINE, URINE: NEGATIVE
PLATELET # BLD: 263 K/UL (ref 138–453)
PLATELET ESTIMATE: ABNORMAL
PMV BLD AUTO: 11.5 FL (ref 8.1–13.5)
PROPOXYPHENE, URINE: NORMAL
PROTEIN UA: ABNORMAL
RBC # BLD: 5.09 M/UL (ref 3.95–5.11)
RBC # BLD: ABNORMAL 10*6/UL
RBC UA: ABNORMAL /HPF (ref 0–2)
RENAL EPITHELIAL, UA: ABNORMAL /HPF
SEG NEUTROPHILS: 75 % (ref 36–65)
SEGMENTED NEUTROPHILS ABSOLUTE COUNT: 13.25 K/UL (ref 1.5–8.1)
SPECIFIC GRAVITY UA: 1.02 (ref 1–1.03)
T. PALLIDUM, IGG: NONREACTIVE
TEST INFORMATION: NORMAL
TRICHOMONAS: ABNORMAL
TRICYCLIC ANTIDEPRESSANTS, UR: NORMAL
TURBIDITY: ABNORMAL
URINE HGB: ABNORMAL
UROBILINOGEN, URINE: NORMAL
WBC # BLD: 17.6 K/UL (ref 3.5–11.3)
WBC # BLD: ABNORMAL 10*3/UL
WBC UA: ABNORMAL /HPF (ref 0–5)
YEAST: ABNORMAL

## 2018-01-06 PROCEDURE — 6360000002 HC RX W HCPCS: Performed by: OBSTETRICS & GYNECOLOGY

## 2018-01-06 PROCEDURE — 7200000001 HC VAGINAL DELIVERY

## 2018-01-06 PROCEDURE — 6370000000 HC RX 637 (ALT 250 FOR IP): Performed by: OBSTETRICS & GYNECOLOGY

## 2018-01-06 PROCEDURE — 86780 TREPONEMA PALLIDUM: CPT

## 2018-01-06 PROCEDURE — 85025 COMPLETE CBC W/AUTO DIFF WBC: CPT

## 2018-01-06 PROCEDURE — 81001 URINALYSIS AUTO W/SCOPE: CPT

## 2018-01-06 PROCEDURE — 88307 TISSUE EXAM BY PATHOLOGIST: CPT

## 2018-01-06 PROCEDURE — 86900 BLOOD TYPING SEROLOGIC ABO: CPT

## 2018-01-06 PROCEDURE — 80307 DRUG TEST PRSMV CHEM ANLYZR: CPT

## 2018-01-06 PROCEDURE — 2580000003 HC RX 258: Performed by: STUDENT IN AN ORGANIZED HEALTH CARE EDUCATION/TRAINING PROGRAM

## 2018-01-06 PROCEDURE — 87086 URINE CULTURE/COLONY COUNT: CPT

## 2018-01-06 PROCEDURE — 59409 OBSTETRICAL CARE: CPT | Performed by: OBSTETRICS & GYNECOLOGY

## 2018-01-06 PROCEDURE — 86850 RBC ANTIBODY SCREEN: CPT

## 2018-01-06 PROCEDURE — 1220000000 HC SEMI PRIVATE OB R&B

## 2018-01-06 PROCEDURE — 86901 BLOOD TYPING SEROLOGIC RH(D): CPT

## 2018-01-06 RX ORDER — ONDANSETRON 2 MG/ML
4 INJECTION INTRAMUSCULAR; INTRAVENOUS EVERY 6 HOURS PRN
Status: DISCONTINUED | OUTPATIENT
Start: 2018-01-06 | End: 2018-01-06

## 2018-01-06 RX ORDER — ACETAMINOPHEN 500 MG
1000 TABLET ORAL EVERY 6 HOURS PRN
Status: DISCONTINUED | OUTPATIENT
Start: 2018-01-06 | End: 2018-01-06

## 2018-01-06 RX ORDER — SODIUM CHLORIDE, SODIUM LACTATE, POTASSIUM CHLORIDE, CALCIUM CHLORIDE 600; 310; 30; 20 MG/100ML; MG/100ML; MG/100ML; MG/100ML
INJECTION, SOLUTION INTRAVENOUS CONTINUOUS
Status: DISCONTINUED | OUTPATIENT
Start: 2018-01-06 | End: 2018-01-06

## 2018-01-06 RX ORDER — SODIUM CHLORIDE 0.9 % (FLUSH) 0.9 %
10 SYRINGE (ML) INJECTION PRN
Status: DISCONTINUED | OUTPATIENT
Start: 2018-01-06 | End: 2018-01-06

## 2018-01-06 RX ORDER — DOCUSATE SODIUM 100 MG/1
100 CAPSULE, LIQUID FILLED ORAL 2 TIMES DAILY
Status: DISCONTINUED | OUTPATIENT
Start: 2018-01-06 | End: 2018-01-09 | Stop reason: HOSPADM

## 2018-01-06 RX ORDER — ACETAMINOPHEN 500 MG
1000 TABLET ORAL EVERY 6 HOURS PRN
Status: DISCONTINUED | OUTPATIENT
Start: 2018-01-06 | End: 2018-01-09 | Stop reason: HOSPADM

## 2018-01-06 RX ORDER — ONDANSETRON 4 MG/1
4 TABLET, FILM COATED ORAL EVERY 6 HOURS PRN
Status: DISCONTINUED | OUTPATIENT
Start: 2018-01-06 | End: 2018-01-09 | Stop reason: HOSPADM

## 2018-01-06 RX ORDER — MORPHINE SULFATE 2 MG/ML
4 INJECTION, SOLUTION INTRAMUSCULAR; INTRAVENOUS ONCE
Status: COMPLETED | OUTPATIENT
Start: 2018-01-06 | End: 2018-01-06

## 2018-01-06 RX ORDER — LANOLIN 100 %
OINTMENT (GRAM) TOPICAL PRN
Status: DISCONTINUED | OUTPATIENT
Start: 2018-01-06 | End: 2018-01-09 | Stop reason: HOSPADM

## 2018-01-06 RX ORDER — SODIUM CHLORIDE 0.9 % (FLUSH) 0.9 %
10 SYRINGE (ML) INJECTION EVERY 12 HOURS SCHEDULED
Status: DISCONTINUED | OUTPATIENT
Start: 2018-01-06 | End: 2018-01-06

## 2018-01-06 RX ORDER — IBUPROFEN 800 MG/1
800 TABLET ORAL EVERY 8 HOURS PRN
Status: DISCONTINUED | OUTPATIENT
Start: 2018-01-06 | End: 2018-01-09 | Stop reason: HOSPADM

## 2018-01-06 RX ADMIN — IBUPROFEN 800 MG: 800 TABLET, FILM COATED ORAL at 18:05

## 2018-01-06 RX ADMIN — SODIUM CHLORIDE, POTASSIUM CHLORIDE, SODIUM LACTATE AND CALCIUM CHLORIDE: 600; 310; 30; 20 INJECTION, SOLUTION INTRAVENOUS at 11:08

## 2018-01-06 RX ADMIN — Medication 999 MILLI-UNITS/MIN: at 11:26

## 2018-01-06 RX ADMIN — MORPHINE SULFATE 4 MG: 2 INJECTION, SOLUTION INTRAMUSCULAR; INTRAVENOUS at 11:52

## 2018-01-06 RX ADMIN — DOCUSATE SODIUM 100 MG: 100 CAPSULE ORAL at 12:43

## 2018-01-06 ASSESSMENT — PAIN SCALES - GENERAL
PAINLEVEL_OUTOF10: 3
PAINLEVEL_OUTOF10: 7

## 2018-01-06 NOTE — L&D DELIVERY NOTE
Mother's Information     Labor Events     labor?:  Yes   Rupture date:  18 Rupture time:     Rupture type:  Spontaneous=SROM   Fluid color:  Clear   Fluid odor:  None         Mother Delivery Information    Episiotomy:  None   Lacerations:  None   Repair Suture:  None   Surgical or Additional Est. Blood Loss (mL):  0 (View Only):  Edit in Flowsheets   Combined Est. Blood Loss (mL):  0            Gerardo Maurer [[id number]]     Events of Labor     labor?:  Yes    steroids?:  Partial Course   Cervical ripening date/time:     Antibiotics received during labor?:  No   Rupture date/time: 18   Rupture type:  Spontaneous=SROM   Fluid color:  Clear   Fluid odor:  None   Induction:  None   Augmentation:  None   Labor complications:  None             Print Group Title    Labor onset date/time:     Dilation complete date/time:   18 1054 EST   Start pushing:    Decision time (emergent ):            Anesthesia    Method:  None             Assisted Delivery Details    Forceps attempted?:  No   Vacuum extractor attempted?:  No            Document Additional Attempt       Document Additional Attempt                       Shoulder Dystocia    Shoulder dystocia present?:  No            Add Second Maneuver      Add Third Maneuver      Add Fourth Maneuver      Add Fifth Maneuver      Add Sixth Maneuver      Add Seventh Maneuver      Add Eighth Maneuver      Add Ninth Maneuver              Presentation    Position:  Left   _:  Occiput   _:  Anterior          [place] Information     Changing the 's delivery date/time could affect patient care.:     Delivery date/time:   18   Delivery type:  Vaginal, Spontaneous Delivery    Details:                Delivery Providers    Delivering clinician:  Akhil Alfred MD   Other personnel:   Provider Role   Akhil Alfred MD Obstetrician   Erik De La Fuente, DO Resident   Charu Otto, DO Resident   John Paul Lucio Ashlyn Rosales, RN Registered Nurse   Nadya Licona, RN Registered Nurse                Cord    Vessels:  3 Vessels   Complications:  None   Delayed Cord Clamping?:  Yes   Cord Clamped Date/Time:  2018 111   Cord Blood Disposition:  Lab   Gases Sent?:  Yes   Stem Cell Collection (by MD):  No          Placenta    Date/time:  2018 112   Removal:  Spontaneous   Appearance:  Intact   Disposition:  Pathology          Delivery Resuscitation    Method:  Bulb Suction, Stimulation          Apgars    Living status:  Living   Apgars    1 Minute:   5 Minute:   10 Minute 15 Minute 20 Minute   Skin Color: 0  1       Heart Rate: 2  2       Reflex Irritability: 2  2       Muscle Tone: 2  2       Respiratory Effort: 2  2       Total: 8  9                  Apgars Assigned By:  NICU          Skin to Skin    Skin to skin initiation date/time: 18   Skin to skin with:   Mother   Skin to skin end date/time: 18             [place] Measurements    Weight:  2050 g Length:  44.5 cm   Head circum.:  30 cm Chest circum.:  28 cm   Abdominal girth:  25 cm          Delivery Information    Episiotomy:  None   Perineal lacerations:  None    Vaginal laceration:  No    Cervical laceration:  No    Vaginal delivery est. blood loss (mL):  0   Surgical or additional est. blood loss (mL):  0 (View Only):  Edit in Flowsheets   Combined est. blood loss (mL):  0   Repair suture:  None          Vaginal Delivery Counts     4x4:   Needles:   Instruments:   Lap Pads:   Sponges:     Initial counts:          Final counts:             Final count personnel:     Final count verified by:     Accurate final count?:  Yes          Other Procedures    Procedures:  None          Labor Length     Hours:   Minutes:     1st stage:       2nd stage:   0 19   3rd stage:   0 12              Vaginal Delivery Note  Department of Obstetrics and Gynecology  [address] [facility]       Patient: Bartolo Tim   : 1990  MRN: [id number]    Specimen: placenta sent to pathology, cord blood and cord gases  Estimated blood loss:  200ml  Condition:  infant stable to special care nursery and mother stable  Counts: instrument and sponge counts correct  Blood Type and Rh: O POSITIVE    Rubella Immunity Status: immune  Infant Feeding: bottle feeding    Abdirahman Alas DO  Ob/Gyn Resident  1/6/2018, 12:19 PM
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
## 2018-01-06 NOTE — DISCHARGE SUMMARY
male  Birth Weight: 4 lb 8.3 oz (2.05 kg)  Apgars: 8 at 1 minute and 9 at 5 minutes      Postpartum course: normal.      Course of patient: uncomplicated    Discharge to: Home    Readmission planned: no     Recommendations on Discharge:     Medications:    Benjamin Gonsalez   Home Medication Instructions IBI:[id number]    Printed on:01/08/18 2565   Medication Information                      docusate sodium (COLACE, DULCOLAX) 100 MG CAPS  Take 100 mg by mouth 2 times daily             ibuprofen (ADVIL;MOTRIN) 800 MG tablet  Take 1 tablet by mouth every 8 hours as needed for Pain             Misc. Devices MISC  1 each by Does not apply route once as needed (discomfort) Abdominal pregnancy support belt. Dx sciatica in pregnancy. Prenatal Vit-Fe Fumarate-FA (RAFY-RUL PRENATAL VITAMINS) 28-0.8 MG TABS  Take 1 tablet by mouth daily                 Activity: pelvic rest x 6 weeks, no lifting greater than 15 lbs  Diet: regular diet  Follow up: 2 weeks     Condition on discharge: stable    Discharge date: 1/8/18    Patrecia Gitelman, DO  Ob/Gyn Resident    Comments:  Home care and follow-up care were reviewed. Pelvic rest, and birth control were reviewed. Signs and symptoms of mastitis and post partum depression were reviewed. The patient is to notify her physician if any of these occur. The patient was counseled on secondary smoke risks and the increased risk of sudden infant death syndrome and respiratory problems to her baby with exposure. She was counseled on various alternate recommendations to decrease the exposure to secondary smoke to her children.

## 2018-01-06 NOTE — PLAN OF CARE
Problem: Pain - Acute:  Goal: Pain level will decrease  Pain level will decrease   Outcome: Met This Shift

## 2018-01-07 LAB
CULTURE: NORMAL
CULTURE: NORMAL
Lab: NORMAL
SPECIMEN DESCRIPTION: NORMAL
STATUS: NORMAL

## 2018-01-07 PROCEDURE — 6370000000 HC RX 637 (ALT 250 FOR IP): Performed by: OBSTETRICS & GYNECOLOGY

## 2018-01-07 PROCEDURE — 1220000000 HC SEMI PRIVATE OB R&B

## 2018-01-07 RX ORDER — PSEUDOEPHEDRINE HCL 30 MG
100 TABLET ORAL 2 TIMES DAILY
Qty: 60 CAPSULE | Refills: 1 | Status: SHIPPED | OUTPATIENT
Start: 2018-01-07 | End: 2018-07-05

## 2018-01-07 RX ORDER — IBUPROFEN 800 MG/1
800 TABLET ORAL EVERY 8 HOURS PRN
Qty: 30 TABLET | Refills: 0 | Status: SHIPPED | OUTPATIENT
Start: 2018-01-07 | End: 2018-07-05 | Stop reason: SDUPTHER

## 2018-01-07 RX ADMIN — DOCUSATE SODIUM 100 MG: 100 CAPSULE ORAL at 00:27

## 2018-01-07 RX ADMIN — IBUPROFEN 800 MG: 800 TABLET, FILM COATED ORAL at 18:16

## 2018-01-07 RX ADMIN — IBUPROFEN 800 MG: 800 TABLET, FILM COATED ORAL at 06:47

## 2018-01-07 RX ADMIN — DOCUSATE SODIUM 100 MG: 100 CAPSULE ORAL at 07:48

## 2018-01-07 ASSESSMENT — PAIN SCALES - GENERAL
PAINLEVEL_OUTOF10: 4
PAINLEVEL_OUTOF10: 3

## 2018-01-08 VITALS
HEIGHT: 62 IN | TEMPERATURE: 99 F | HEART RATE: 86 BPM | SYSTOLIC BLOOD PRESSURE: 104 MMHG | RESPIRATION RATE: 16 BRPM | WEIGHT: 158 LBS | BODY MASS INDEX: 29.08 KG/M2 | DIASTOLIC BLOOD PRESSURE: 68 MMHG

## 2018-01-08 PROCEDURE — 6370000000 HC RX 637 (ALT 250 FOR IP): Performed by: OBSTETRICS & GYNECOLOGY

## 2018-01-08 PROCEDURE — S9443 LACTATION CLASS: HCPCS

## 2018-01-08 RX ADMIN — IBUPROFEN 800 MG: 800 TABLET, FILM COATED ORAL at 06:30

## 2018-01-08 RX ADMIN — DOCUSATE SODIUM 100 MG: 100 CAPSULE ORAL at 09:34

## 2018-01-08 RX ADMIN — DOCUSATE SODIUM 100 MG: 100 CAPSULE ORAL at 00:33

## 2018-01-08 ASSESSMENT — PAIN SCALES - GENERAL
PAINLEVEL_OUTOF10: 4
PAINLEVEL_OUTOF10: 1

## 2018-01-09 LAB — SURGICAL PATHOLOGY REPORT: NORMAL

## 2018-02-01 ENCOUNTER — POSTPARTUM VISIT (OUTPATIENT)
Dept: OBGYN CLINIC | Age: 28
End: 2018-02-01

## 2018-02-01 VITALS
WEIGHT: 152.8 LBS | BODY MASS INDEX: 28.12 KG/M2 | DIASTOLIC BLOOD PRESSURE: 76 MMHG | SYSTOLIC BLOOD PRESSURE: 118 MMHG | HEIGHT: 62 IN

## 2018-02-01 PROCEDURE — 99024 POSTOP FOLLOW-UP VISIT: CPT | Performed by: OBSTETRICS & GYNECOLOGY

## 2018-02-01 PROCEDURE — G8484 FLU IMMUNIZE NO ADMIN: HCPCS | Performed by: OBSTETRICS & GYNECOLOGY

## 2018-02-01 PROCEDURE — G8417 CALC BMI ABV UP PARAM F/U: HCPCS | Performed by: OBSTETRICS & GYNECOLOGY

## 2018-02-01 PROCEDURE — G8427 DOCREV CUR MEDS BY ELIG CLIN: HCPCS | Performed by: OBSTETRICS & GYNECOLOGY

## 2018-02-01 PROCEDURE — 1036F TOBACCO NON-USER: CPT | Performed by: OBSTETRICS & GYNECOLOGY

## 2018-02-01 PROCEDURE — 1111F DSCHRG MED/CURRENT MED MERGE: CPT | Performed by: OBSTETRICS & GYNECOLOGY

## 2018-02-01 RX ORDER — NORETHINDRONE ACETATE AND ETHINYL ESTRADIOL 1.5-30(21)
1 KIT ORAL DAILY
Qty: 1 PACKET | Refills: 12 | Status: SHIPPED | OUTPATIENT
Start: 2018-02-01 | End: 2019-02-11

## 2018-03-01 ENCOUNTER — POSTPARTUM VISIT (OUTPATIENT)
Dept: OBGYN CLINIC | Age: 28
End: 2018-03-01
Payer: MEDICARE

## 2018-03-01 VITALS
HEIGHT: 62 IN | DIASTOLIC BLOOD PRESSURE: 70 MMHG | SYSTOLIC BLOOD PRESSURE: 122 MMHG | WEIGHT: 159.4 LBS | BODY MASS INDEX: 29.33 KG/M2

## 2018-03-01 PROCEDURE — G8427 DOCREV CUR MEDS BY ELIG CLIN: HCPCS | Performed by: OBSTETRICS & GYNECOLOGY

## 2018-03-01 PROCEDURE — G8417 CALC BMI ABV UP PARAM F/U: HCPCS | Performed by: OBSTETRICS & GYNECOLOGY

## 2018-03-01 PROCEDURE — G8484 FLU IMMUNIZE NO ADMIN: HCPCS | Performed by: OBSTETRICS & GYNECOLOGY

## 2018-03-01 PROCEDURE — 1036F TOBACCO NON-USER: CPT | Performed by: OBSTETRICS & GYNECOLOGY
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
## 2018-07-05 ENCOUNTER — HOSPITAL ENCOUNTER (OUTPATIENT)
Age: 28
Setting detail: SPECIMEN
Discharge: HOME | End: 2018-07-05
Payer: MEDICARE

## 2018-07-05 ENCOUNTER — OFFICE VISIT (OUTPATIENT)
Dept: OBGYN CLINIC | Age: 28
End: 2018-07-05
Payer: MEDICARE

## 2018-07-05 VITALS
SYSTOLIC BLOOD PRESSURE: 110 MMHG | DIASTOLIC BLOOD PRESSURE: 60 MMHG | WEIGHT: 184.2 LBS | BODY MASS INDEX: 33.9 KG/M2 | HEIGHT: 62 IN

## 2018-07-05 DIAGNOSIS — N89.8 VAGINAL ODOR: ICD-10-CM

## 2018-07-05 DIAGNOSIS — D25.9 UTERINE LEIOMYOMA, UNSPECIFIED LOCATION: ICD-10-CM

## 2018-07-05 DIAGNOSIS — R35.0 URINE FREQUENCY: ICD-10-CM

## 2018-07-05 DIAGNOSIS — N30.00 ACUTE CYSTITIS WITHOUT HEMATURIA: Primary | ICD-10-CM

## 2018-07-05 DIAGNOSIS — Z01.419 VISIT FOR GYNECOLOGIC EXAMINATION: Primary | ICD-10-CM

## 2018-07-05 LAB
-: ABNORMAL
AMORPHOUS: ABNORMAL
BACTERIA: ABNORMAL
BILIRUBIN URINE: NEGATIVE
CASTS UA: ABNORMAL /LPF (ref 0–2)
COLOR: YELLOW
COMMENT UA: ABNORMAL
CRYSTALS, UA: ABNORMAL /HPF
DIRECT EXAM: ABNORMAL
EPITHELIAL CELLS UA: ABNORMAL /HPF (ref 0–5)
GLUCOSE URINE: NEGATIVE
KETONES, URINE: NEGATIVE
LEUKOCYTE ESTERASE, URINE: ABNORMAL
Lab: ABNORMAL
MUCUS: ABNORMAL
NITRITE, URINE: NEGATIVE
OTHER OBSERVATIONS UA: ABNORMAL
PH UA: >9 (ref 5–8)
PROTEIN UA: NEGATIVE
RBC UA: ABNORMAL /HPF (ref 0–2)
RENAL EPITHELIAL, UA: ABNORMAL /HPF
SPECIFIC GRAVITY UA: 1.02 (ref 1–1.03)
SPECIMEN DESCRIPTION: ABNORMAL
STATUS: ABNORMAL
TRICHOMONAS: ABNORMAL
TURBIDITY: CLEAR
URINE HGB: NEGATIVE
UROBILINOGEN, URINE: NORMAL
WBC UA: ABNORMAL /HPF (ref 0–5)
YEAST: ABNORMAL

## 2018-07-05 PROCEDURE — 81003 URINALYSIS AUTO W/O SCOPE: CPT | Performed by: OBSTETRICS & GYNECOLOGY

## 2018-07-05 PROCEDURE — 99395 PREV VISIT EST AGE 18-39: CPT | Performed by: OBSTETRICS & GYNECOLOGY

## 2018-07-05 RX ORDER — IBUPROFEN 800 MG/1
800 TABLET ORAL EVERY 8 HOURS PRN
Qty: 30 TABLET | Refills: 5 | Status: SHIPPED | OUTPATIENT
Start: 2018-07-05 | End: 2019-02-11

## 2018-07-05 RX ORDER — CITALOPRAM 20 MG/1
20 TABLET ORAL DAILY
Qty: 30 TABLET | Refills: 3 | Status: SHIPPED | OUTPATIENT
Start: 2018-07-05 | End: 2019-02-11 | Stop reason: ALTCHOICE

## 2018-07-05 RX ORDER — NITROFURANTOIN 25; 75 MG/1; MG/1
100 CAPSULE ORAL 2 TIMES DAILY
Qty: 14 CAPSULE | Refills: 0 | Status: SHIPPED | OUTPATIENT
Start: 2018-07-05 | End: 2018-07-15

## 2018-07-05 ASSESSMENT — ENCOUNTER SYMPTOMS
DIARRHEA: 0
BACK PAIN: 0
ABDOMINAL DISTENTION: 0
ABDOMINAL PAIN: 0
COUGH: 0
CONSTIPATION: 0
SHORTNESS OF BREATH: 0

## 2018-07-05 NOTE — PROGRESS NOTES
The patient complained of a vaginal discharge so we checked a urinalysis. The U/A is borderline with moderate leukocyte esterase and 2-5 white cells. I spoke with the patient and we decide to treat it anyway, so I have called in for Macrobid 100 b.i.d.
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
## 2018-07-06 LAB
CULTURE: NORMAL
Lab: NORMAL
SPECIMEN DESCRIPTION: NORMAL
STATUS: NORMAL

## 2018-07-06 RX ORDER — METRONIDAZOLE 500 MG/1
500 TABLET ORAL 2 TIMES DAILY
Qty: 14 TABLET | Refills: 0 | Status: SHIPPED | OUTPATIENT
Start: 2018-07-06 | End: 2018-07-13

## 2018-07-20 ENCOUNTER — TELEPHONE (OUTPATIENT)
Dept: OBGYN CLINIC | Age: 28
End: 2018-07-20

## 2018-07-23 LAB
HPV SAMPLE: ABNORMAL
HPV SOURCE: ABNORMAL
HPV, GENOTYPE 16: NOT DETECTED
HPV, GENOTYPE 18: NOT DETECTED
HPV, HIGH RISK OTHER: DETECTED
HPV, INTERPRETATION: ABNORMAL

## 2018-07-24 LAB — CYTOLOGY REPORT: NORMAL

## 2019-01-02 ENCOUNTER — TELEPHONE (OUTPATIENT)
Dept: OBGYN CLINIC | Age: 29
End: 2019-01-02

## 2019-01-16 ENCOUNTER — HOSPITAL ENCOUNTER (OUTPATIENT)
Age: 29
Setting detail: SPECIMEN
Discharge: HOME | End: 2019-01-16
Payer: MEDICARE

## 2019-01-16 ENCOUNTER — PROCEDURE VISIT (OUTPATIENT)
Dept: OBGYN CLINIC | Age: 29
End: 2019-01-16
Payer: MEDICARE

## 2019-01-16 VITALS
BODY MASS INDEX: 31.65 KG/M2 | DIASTOLIC BLOOD PRESSURE: 70 MMHG | HEIGHT: 62 IN | SYSTOLIC BLOOD PRESSURE: 110 MMHG | WEIGHT: 172 LBS

## 2019-01-16 DIAGNOSIS — R87.612 LOW GRADE SQUAMOUS INTRAEPITHELIAL LESION ON CYTOLOGIC SMEAR OF CERVIX (LGSIL): ICD-10-CM

## 2019-01-16 DIAGNOSIS — O09.291 HISTORY OF PRIOR PREGNANCY WITH SHORT CERVIX, CURRENTLY PREGNANT IN FIRST TRIMESTER: ICD-10-CM

## 2019-01-16 DIAGNOSIS — N91.2 AMENORRHEA: ICD-10-CM

## 2019-01-16 DIAGNOSIS — O09.211 CURRENT PREGNANCY WITH HISTORY OF PRE-TERM LABOR IN FIRST TRIMESTER: ICD-10-CM

## 2019-01-16 DIAGNOSIS — N91.2 AMENORRHEA: Primary | ICD-10-CM

## 2019-01-16 LAB
-: NORMAL
ABO/RH: NORMAL
ABSOLUTE EOS #: 0.3 K/UL (ref 0–0.44)
ABSOLUTE IMMATURE GRANULOCYTE: 0.09 K/UL (ref 0–0.3)
ABSOLUTE LYMPH #: 3.14 K/UL (ref 1.1–3.7)
ABSOLUTE MONO #: 1.35 K/UL (ref 0.1–1.2)
AMORPHOUS: NORMAL
ANTIBODY SCREEN: NEGATIVE
BACTERIA: NORMAL
BASOPHILS # BLD: 1 % (ref 0–2)
BASOPHILS ABSOLUTE: 0.09 K/UL (ref 0–0.2)
BILIRUBIN URINE: NEGATIVE
CASTS UA: NORMAL /LPF (ref 0–8)
CHLAMYDIA CULTURE: NEGATIVE
COLOR: YELLOW
COMMENT UA: ABNORMAL
CONTROL: PRESENT
CRYSTALS, UA: NORMAL /HPF
CULTURE, GONORRHOEAE: NEGATIVE
DIFFERENTIAL TYPE: ABNORMAL
EOSINOPHILS RELATIVE PERCENT: 2 % (ref 1–4)
EPITHELIAL CELLS UA: NORMAL /HPF (ref 0–5)
GLUCOSE URINE: NEGATIVE
HCT VFR BLD CALC: 38.8 % (ref 36.3–47.1)
HEMOGLOBIN: 12.7 G/DL (ref 11.9–15.1)
HEPATITIS B SURFACE ANTIGEN: NONREACTIVE
HIV AG/AB: NONREACTIVE
IMMATURE GRANULOCYTES: 1 %
KETONES, URINE: ABNORMAL
LEUKOCYTE ESTERASE, URINE: ABNORMAL
LYMPHOCYTES # BLD: 16 % (ref 24–43)
MCH RBC QN AUTO: 28.4 PG (ref 25.2–33.5)
MCHC RBC AUTO-ENTMCNC: 32.7 G/DL (ref 28.4–34.8)
MCV RBC AUTO: 86.8 FL (ref 82.6–102.9)
MONOCYTES # BLD: 7 % (ref 3–12)
MUCUS: NORMAL
NITRITE, URINE: NEGATIVE
NRBC AUTOMATED: 0 PER 100 WBC
OTHER OBSERVATIONS UA: NORMAL
PDW BLD-RTO: 12.2 % (ref 11.8–14.4)
PH UA: 6 (ref 5–8)
PLATELET # BLD: 339 K/UL (ref 138–453)
PLATELET ESTIMATE: ABNORMAL
PMV BLD AUTO: 10.7 FL (ref 8.1–13.5)
PREGNANCY TEST URINE, POC: POSITIVE
PROTEIN UA: NEGATIVE
RBC # BLD: 4.47 M/UL (ref 3.95–5.11)
RBC # BLD: ABNORMAL 10*6/UL
RBC UA: NORMAL /HPF (ref 0–4)
RENAL EPITHELIAL, UA: NORMAL /HPF
RUBV IGG SER QL: >500 IU/ML
SEG NEUTROPHILS: 73 % (ref 36–65)
SEGMENTED NEUTROPHILS ABSOLUTE COUNT: 14.56 K/UL (ref 1.5–8.1)
SPECIFIC GRAVITY UA: 1.03 (ref 1–1.03)
T. PALLIDUM, IGG: NONREACTIVE
TRICHOMONAS: NORMAL
TURBIDITY: ABNORMAL
URINE HGB: ABNORMAL
UROBILINOGEN, URINE: NORMAL
WBC # BLD: 19.5 K/UL (ref 3.5–11.3)
WBC # BLD: ABNORMAL 10*3/UL
WBC UA: NORMAL /HPF (ref 0–5)
YEAST: NORMAL

## 2019-01-16 PROCEDURE — 1036F TOBACCO NON-USER: CPT | Performed by: OBSTETRICS & GYNECOLOGY

## 2019-01-16 PROCEDURE — 81025 URINE PREGNANCY TEST: CPT | Performed by: OBSTETRICS & GYNECOLOGY

## 2019-01-16 PROCEDURE — G8484 FLU IMMUNIZE NO ADMIN: HCPCS | Performed by: OBSTETRICS & GYNECOLOGY

## 2019-01-16 PROCEDURE — 99213 OFFICE O/P EST LOW 20 MIN: CPT | Performed by: OBSTETRICS & GYNECOLOGY

## 2019-01-16 PROCEDURE — G8427 DOCREV CUR MEDS BY ELIG CLIN: HCPCS | Performed by: OBSTETRICS & GYNECOLOGY

## 2019-01-16 PROCEDURE — G8417 CALC BMI ABV UP PARAM F/U: HCPCS | Performed by: OBSTETRICS & GYNECOLOGY

## 2019-01-17 ENCOUNTER — TELEPHONE (OUTPATIENT)
Dept: OBGYN CLINIC | Age: 29
End: 2019-01-17

## 2019-01-17 LAB
AMPHETAMINE SCREEN URINE: NEGATIVE
BARBITURATE SCREEN URINE: NEGATIVE
BENZODIAZEPINE SCREEN, URINE: NEGATIVE
BUPRENORPHINE URINE: NORMAL
C TRACH DNA GENITAL QL NAA+PROBE: NEGATIVE
CANNABINOID SCREEN URINE: NEGATIVE
COCAINE METABOLITE, URINE: NEGATIVE
CULTURE: NORMAL
Lab: NORMAL
MDMA URINE: NORMAL
METHADONE SCREEN, URINE: NEGATIVE
METHAMPHETAMINE, URINE: NORMAL
N. GONORRHOEAE DNA: NEGATIVE
OPIATES, URINE: NEGATIVE
OXYCODONE SCREEN URINE: NEGATIVE
PHENCYCLIDINE, URINE: NEGATIVE
PROPOXYPHENE, URINE: NORMAL
SPECIMEN DESCRIPTION: NORMAL
STATUS: NORMAL
TEST INFORMATION: NORMAL
TRICYCLIC ANTIDEPRESSANTS, UR: NORMAL

## 2019-01-25 ENCOUNTER — PROCEDURE VISIT (OUTPATIENT)
Dept: OBGYN CLINIC | Age: 29
End: 2019-01-25
Payer: MEDICARE

## 2019-01-25 DIAGNOSIS — Z3A.09 9 WEEKS GESTATION OF PREGNANCY: ICD-10-CM

## 2019-01-25 DIAGNOSIS — O09.899 H/O PRETERM DELIVERY, CURRENTLY PREGNANT: ICD-10-CM

## 2019-01-25 DIAGNOSIS — Z36.89 ENCOUNTER TO ESTABLISH GESTATIONAL AGE USING ULTRASOUND: ICD-10-CM

## 2019-01-25 LAB
CRL: NORMAL CM
SAC DIAMETER: NORMAL CM

## 2019-01-25 PROCEDURE — 76801 OB US < 14 WKS SINGLE FETUS: CPT | Performed by: OBSTETRICS & GYNECOLOGY

## 2019-02-06 LAB — CYTOLOGY REPORT: NORMAL

## 2019-02-11 ENCOUNTER — INITIAL PRENATAL (OUTPATIENT)
Dept: OBGYN CLINIC | Age: 29
End: 2019-02-11
Payer: MEDICARE

## 2019-02-11 VITALS
BODY MASS INDEX: 30.87 KG/M2 | HEART RATE: 118 BPM | WEIGHT: 168.8 LBS | SYSTOLIC BLOOD PRESSURE: 102 MMHG | DIASTOLIC BLOOD PRESSURE: 70 MMHG

## 2019-02-11 DIAGNOSIS — O09.91 SUPERVISION OF HIGH RISK PREGNANCY IN FIRST TRIMESTER: Primary | ICD-10-CM

## 2019-02-11 DIAGNOSIS — Z3A.11 11 WEEKS GESTATION OF PREGNANCY: ICD-10-CM

## 2019-02-11 DIAGNOSIS — J40 BRONCHITIS: Primary | ICD-10-CM

## 2019-02-11 PROCEDURE — H1000 PRENATAL CARE ATRISK ASSESSM: HCPCS | Performed by: NURSE PRACTITIONER

## 2019-02-11 PROCEDURE — G8484 FLU IMMUNIZE NO ADMIN: HCPCS | Performed by: NURSE PRACTITIONER

## 2019-02-11 PROCEDURE — G8417 CALC BMI ABV UP PARAM F/U: HCPCS | Performed by: NURSE PRACTITIONER

## 2019-02-11 PROCEDURE — 1036F TOBACCO NON-USER: CPT | Performed by: NURSE PRACTITIONER

## 2019-02-11 PROCEDURE — 99213 OFFICE O/P EST LOW 20 MIN: CPT | Performed by: NURSE PRACTITIONER

## 2019-02-11 PROCEDURE — G8427 DOCREV CUR MEDS BY ELIG CLIN: HCPCS | Performed by: NURSE PRACTITIONER

## 2019-02-13 ENCOUNTER — TELEPHONE (OUTPATIENT)
Dept: OBGYN CLINIC | Age: 29
End: 2019-02-13

## 2019-02-18 ENCOUNTER — TELEPHONE (OUTPATIENT)
Dept: OBGYN CLINIC | Age: 29
End: 2019-02-18

## 2019-02-18 ENCOUNTER — ROUTINE PRENATAL (OUTPATIENT)
Dept: PERINATAL CARE | Age: 29
End: 2019-02-18
Payer: MEDICARE

## 2019-02-18 VITALS
RESPIRATION RATE: 16 BRPM | BODY MASS INDEX: 31.28 KG/M2 | HEIGHT: 62 IN | HEART RATE: 102 BPM | WEIGHT: 170 LBS | TEMPERATURE: 98.7 F | SYSTOLIC BLOOD PRESSURE: 102 MMHG | DIASTOLIC BLOOD PRESSURE: 62 MMHG

## 2019-02-18 DIAGNOSIS — O99.211 OBESITY AFFECTING PREGNANCY IN FIRST TRIMESTER: ICD-10-CM

## 2019-02-18 DIAGNOSIS — Z36.9 FIRST TRIMESTER SCREENING: ICD-10-CM

## 2019-02-18 DIAGNOSIS — Z3A.12 12 WEEKS GESTATION OF PREGNANCY: ICD-10-CM

## 2019-02-18 DIAGNOSIS — O09.899 SHORT INTERVAL BETWEEN PREGNANCIES COMPLICATING PREGNANCY, ANTEPARTUM: ICD-10-CM

## 2019-02-18 DIAGNOSIS — O09.211 CURRENT PREGNANCY WITH HISTORY OF PRE-TERM LABOR IN FIRST TRIMESTER: Primary | ICD-10-CM

## 2019-02-18 DIAGNOSIS — O36.80X0 ENCOUNTER TO DETERMINE FETAL VIABILITY OF PREGNANCY, SINGLE OR UNSPECIFIED FETUS: ICD-10-CM

## 2019-02-18 PROBLEM — O26.879 CERVICAL SHORTENING: Status: RESOLVED | Noted: 2017-10-31 | Resolved: 2019-02-18

## 2019-02-18 PROBLEM — O09.90 HIGH-RISK PREGNANCY: Status: RESOLVED | Noted: 2017-10-31 | Resolved: 2019-02-18

## 2019-02-18 PROBLEM — O44.42 LOW-LYING PLACENTA IN SECOND TRIMESTER: Status: RESOLVED | Noted: 2017-09-22 | Resolved: 2019-02-18

## 2019-02-18 PROBLEM — R82.71 GBS BACTERIURIA: Status: RESOLVED | Noted: 2018-01-06 | Resolved: 2019-02-18

## 2019-02-18 PROBLEM — Z3A.33 33 WEEKS GESTATION OF PREGNANCY: Status: RESOLVED | Noted: 2017-12-30 | Resolved: 2019-02-18

## 2019-02-18 PROBLEM — O47.00 PRETERM CONTRACTIONS: Status: RESOLVED | Noted: 2017-10-31 | Resolved: 2019-02-18

## 2019-02-18 PROBLEM — R10.2 FEELING PELVIC PRESSURE IN PREGNANCY, ANTEPARTUM: Status: RESOLVED | Noted: 2017-11-15 | Resolved: 2019-02-18

## 2019-02-18 PROBLEM — O36.5930 POOR FETAL GROWTH AFFECTING MANAGEMENT OF MOTHER IN THIRD TRIMESTER: Status: RESOLVED | Noted: 2018-01-05 | Resolved: 2019-02-18

## 2019-02-18 PROBLEM — O26.899 FEELING PELVIC PRESSURE IN PREGNANCY, ANTEPARTUM: Status: RESOLVED | Noted: 2017-11-15 | Resolved: 2019-02-18

## 2019-02-18 PROBLEM — O60.00 PRETERM LABOR: Status: RESOLVED | Noted: 2018-01-06 | Resolved: 2019-02-18

## 2019-02-18 PROBLEM — O28.0 HIGH RISK PREGNANCY WITH LOW PAPPA (PREGNANCY-ASSOCIATED PLASMA PROTEIN A): Status: RESOLVED | Noted: 2017-09-07 | Resolved: 2019-02-18

## 2019-02-18 PROBLEM — O26.879 CERVICAL SHORTENING, ANTEPARTUM CONDITION OR COMPLICATION: Status: RESOLVED | Noted: 2017-10-18 | Resolved: 2019-02-18

## 2019-02-18 PROCEDURE — 99242 OFF/OP CONSLTJ NEW/EST SF 20: CPT | Performed by: OBSTETRICS & GYNECOLOGY

## 2019-02-18 PROCEDURE — G8417 CALC BMI ABV UP PARAM F/U: HCPCS | Performed by: OBSTETRICS & GYNECOLOGY

## 2019-02-18 PROCEDURE — G8428 CUR MEDS NOT DOCUMENT: HCPCS | Performed by: OBSTETRICS & GYNECOLOGY

## 2019-02-18 PROCEDURE — 76801 OB US < 14 WKS SINGLE FETUS: CPT | Performed by: OBSTETRICS & GYNECOLOGY

## 2019-02-18 PROCEDURE — 76813 OB US NUCHAL MEAS 1 GEST: CPT | Performed by: OBSTETRICS & GYNECOLOGY

## 2019-02-18 PROCEDURE — G8484 FLU IMMUNIZE NO ADMIN: HCPCS | Performed by: OBSTETRICS & GYNECOLOGY

## 2019-02-18 NOTE — TELEPHONE ENCOUNTER
[facility] was seen by [facility] today and according to their note initiated referral to [facility] care connection with [facility].

## 2019-02-22 DIAGNOSIS — Z3A.13 13 WEEKS GESTATION OF PREGNANCY: ICD-10-CM

## 2019-02-22 DIAGNOSIS — Z86.59 HISTORY OF POSTPARTUM DEPRESSION: ICD-10-CM

## 2019-02-22 DIAGNOSIS — O28.9 ABNORMAL FIRST TRIMESTER SCREEN: Primary | ICD-10-CM

## 2019-02-22 DIAGNOSIS — Z87.59 HISTORY OF POSTPARTUM DEPRESSION: ICD-10-CM

## 2019-02-25 ENCOUNTER — TELEPHONE (OUTPATIENT)
Dept: OBGYN CLINIC | Age: 29
End: 2019-02-25

## 2019-02-25 NOTE — TELEPHONE ENCOUNTER
[facility] acknowledging referral and checking in to  Insurance coverage.  Scanned in to epic

## 2019-02-28 ENCOUNTER — ROUTINE PRENATAL (OUTPATIENT)
Dept: PERINATAL CARE | Age: 29
End: 2019-02-28
Payer: MEDICARE

## 2019-02-28 ENCOUNTER — HOSPITAL ENCOUNTER (OUTPATIENT)
Age: 29
Discharge: HOME | End: 2019-02-28
Payer: MEDICARE

## 2019-02-28 VITALS
HEART RATE: 91 BPM | BODY MASS INDEX: 31.28 KG/M2 | SYSTOLIC BLOOD PRESSURE: 100 MMHG | TEMPERATURE: 98.6 F | RESPIRATION RATE: 18 BRPM | WEIGHT: 170 LBS | HEIGHT: 62 IN | DIASTOLIC BLOOD PRESSURE: 60 MMHG

## 2019-02-28 DIAGNOSIS — O28.0 HIGH RISK PREGNANCY WITH LOW PAPPA (PREGNANCY-ASSOCIATED PLASMA PROTEIN A): Primary | ICD-10-CM

## 2019-02-28 DIAGNOSIS — Z3A.14 14 WEEKS GESTATION OF PREGNANCY: ICD-10-CM

## 2019-02-28 DIAGNOSIS — O09.899 HIGH RISK PREGNANCY WITH LOW PAPPA (PREGNANCY-ASSOCIATED PLASMA PROTEIN A): Primary | ICD-10-CM

## 2019-02-28 PROCEDURE — 1036F TOBACCO NON-USER: CPT | Performed by: OBSTETRICS & GYNECOLOGY

## 2019-02-28 PROCEDURE — 82105 ALPHA-FETOPROTEIN SERUM: CPT

## 2019-02-28 PROCEDURE — G8417 CALC BMI ABV UP PARAM F/U: HCPCS | Performed by: OBSTETRICS & GYNECOLOGY

## 2019-02-28 PROCEDURE — G8484 FLU IMMUNIZE NO ADMIN: HCPCS | Performed by: OBSTETRICS & GYNECOLOGY

## 2019-02-28 PROCEDURE — G8427 DOCREV CUR MEDS BY ELIG CLIN: HCPCS | Performed by: OBSTETRICS & GYNECOLOGY

## 2019-02-28 PROCEDURE — 99214 OFFICE O/P EST MOD 30 MIN: CPT | Performed by: OBSTETRICS & GYNECOLOGY

## 2019-02-28 PROCEDURE — 36415 COLL VENOUS BLD VENIPUNCTURE: CPT

## 2019-03-01 LAB
SEND OUT REPORT: NORMAL
TEST NAME: NORMAL

## 2019-03-04 LAB
AFP INTERPRETATION: NORMAL
AFP MOM: 0.93
AFP SPECIMEN: NORMAL
AFP: 23 NG/ML
DATE OF BIRTH: NORMAL
DATING METHOD: NORMAL
DETERMINED BY: NORMAL
DIABETIC: NO
DUE DATE: NORMAL
ESTIMATED DUE DATE: NORMAL
FAMILY HISTORY NTD: NO
GESTATIONAL AGE: NORMAL
INSULIN REQ DIABETES: NO
LAST MENSTRUAL PERIOD: NORMAL
MATERNAL AGE AT EDD: 29.7 YR
MATERNAL WEIGHT: 170
MONOCHORIONIC TWINS: NORMAL
NUMBER OF FETUSES: NORMAL
PATIENT WEIGHT UNITS: NORMAL
PATIENT WEIGHT: NORMAL
RACE (MATERNAL): NORMAL
RACE: NORMAL
REPEAT SPECIMEN?: NO
SMOKING: NO
SMOKING: NO
VALPROIC/CARBAMAZEP: NO
ZZ NTE CLEAN UP: HISTORY: NO

## 2019-03-05 NOTE — TELEPHONE ENCOUNTER
Called [facility] and spoke with Citlaly mccormick states [facility] was approved they are just waiting on the rx to be sent by Chandrakant wheeler

## 2019-03-11 NOTE — TELEPHONE ENCOUNTER
Spoke with patient to chack on the status of her enrique injections. She is waiting on shipment of enrique then [facility] will administer. Called [facility] and spoke with Rosie Duggan she shows that Rx will be coming from [address] . They are just waiting on patient to let them know she received the [facility] . Spoke with [facility] [phone] and spoke with \" umm\" she verified they had received RX then had me talk with pharmacist \"Toshia\" she verified patient has $ o co pay . She will nadia this high priority so it is shipped overnight tonight since patient is 16 weeks. Informed patient and advised her once she receives shipment to call [facility] to come ouit and administer.  Patient verbalized understanding

## 2019-03-13 ENCOUNTER — ROUTINE PRENATAL (OUTPATIENT)
Dept: OBGYN CLINIC | Age: 29
End: 2019-03-13
Payer: MEDICARE

## 2019-03-13 VITALS — WEIGHT: 174.6 LBS | DIASTOLIC BLOOD PRESSURE: 64 MMHG | BODY MASS INDEX: 31.93 KG/M2 | SYSTOLIC BLOOD PRESSURE: 100 MMHG

## 2019-03-13 DIAGNOSIS — O09.212 CURRENT PREGNANCY WITH HISTORY OF PRE-TERM LABOR IN SECOND TRIMESTER: ICD-10-CM

## 2019-03-13 PROCEDURE — G8484 FLU IMMUNIZE NO ADMIN: HCPCS | Performed by: OBSTETRICS & GYNECOLOGY

## 2019-03-13 PROCEDURE — 1036F TOBACCO NON-USER: CPT | Performed by: OBSTETRICS & GYNECOLOGY

## 2019-03-13 PROCEDURE — G8427 DOCREV CUR MEDS BY ELIG CLIN: HCPCS | Performed by: OBSTETRICS & GYNECOLOGY

## 2019-03-13 PROCEDURE — 99213 OFFICE O/P EST LOW 20 MIN: CPT | Performed by: OBSTETRICS & GYNECOLOGY

## 2019-03-13 PROCEDURE — G8417 CALC BMI ABV UP PARAM F/U: HCPCS | Performed by: OBSTETRICS & GYNECOLOGY

## 2019-03-13 PROCEDURE — H1000 PRENATAL CARE ATRISK ASSESSM: HCPCS | Performed by: OBSTETRICS & GYNECOLOGY

## 2019-03-14 ENCOUNTER — ROUTINE PRENATAL (OUTPATIENT)
Dept: PERINATAL CARE | Age: 29
End: 2019-03-14
Payer: MEDICARE

## 2019-03-14 VITALS
SYSTOLIC BLOOD PRESSURE: 112 MMHG | BODY MASS INDEX: 31.64 KG/M2 | WEIGHT: 173 LBS | DIASTOLIC BLOOD PRESSURE: 68 MMHG | HEART RATE: 101 BPM

## 2019-03-14 DIAGNOSIS — O34.10 LEIOMYOMA IN PREGNANCY, UTERINE, ANTEPARTUM: ICD-10-CM

## 2019-03-14 DIAGNOSIS — O99.212 OBESITY AFFECTING PREGNANCY IN SECOND TRIMESTER: ICD-10-CM

## 2019-03-14 DIAGNOSIS — O09.899 HIGH RISK PREGNANCY WITH LOW PAPPA (PREGNANCY-ASSOCIATED PLASMA PROTEIN A): Primary | ICD-10-CM

## 2019-03-14 DIAGNOSIS — O09.212 CURRENT PREGNANCY WITH HISTORY OF PRE-TERM LABOR IN SECOND TRIMESTER: ICD-10-CM

## 2019-03-14 DIAGNOSIS — Z3A.16 16 WEEKS GESTATION OF PREGNANCY: ICD-10-CM

## 2019-03-14 DIAGNOSIS — D25.9 LEIOMYOMA IN PREGNANCY, UTERINE, ANTEPARTUM: ICD-10-CM

## 2019-03-14 DIAGNOSIS — O28.0 HIGH RISK PREGNANCY WITH LOW PAPPA (PREGNANCY-ASSOCIATED PLASMA PROTEIN A): Primary | ICD-10-CM

## 2019-03-14 PROCEDURE — 76817 TRANSVAGINAL US OBSTETRIC: CPT | Performed by: OBSTETRICS & GYNECOLOGY

## 2019-03-14 PROCEDURE — 76815 OB US LIMITED FETUS(S): CPT | Performed by: OBSTETRICS & GYNECOLOGY

## 2019-03-28 ENCOUNTER — ROUTINE PRENATAL (OUTPATIENT)
Dept: PERINATAL CARE | Age: 29
End: 2019-03-28
Payer: MEDICARE

## 2019-03-28 VITALS
SYSTOLIC BLOOD PRESSURE: 111 MMHG | WEIGHT: 174 LBS | HEIGHT: 62 IN | BODY MASS INDEX: 32.02 KG/M2 | RESPIRATION RATE: 16 BRPM | DIASTOLIC BLOOD PRESSURE: 63 MMHG | HEART RATE: 103 BPM | TEMPERATURE: 98.4 F

## 2019-03-28 DIAGNOSIS — O28.0 HIGH RISK PREGNANCY WITH LOW PAPPA (PREGNANCY-ASSOCIATED PLASMA PROTEIN A): ICD-10-CM

## 2019-03-28 DIAGNOSIS — O99.212 OBESITY AFFECTING PREGNANCY IN SECOND TRIMESTER: ICD-10-CM

## 2019-03-28 DIAGNOSIS — Z3A.18 18 WEEKS GESTATION OF PREGNANCY: ICD-10-CM

## 2019-03-28 DIAGNOSIS — O09.212 CURRENT PREGNANCY WITH HISTORY OF PRE-TERM LABOR IN SECOND TRIMESTER: Primary | ICD-10-CM

## 2019-03-28 DIAGNOSIS — O09.899 HIGH RISK PREGNANCY WITH LOW PAPPA (PREGNANCY-ASSOCIATED PLASMA PROTEIN A): ICD-10-CM

## 2019-03-28 DIAGNOSIS — D25.9 LEIOMYOMA IN PREGNANCY, UTERINE, ANTEPARTUM: ICD-10-CM

## 2019-03-28 DIAGNOSIS — O34.10 LEIOMYOMA IN PREGNANCY, UTERINE, ANTEPARTUM: ICD-10-CM

## 2019-03-28 DIAGNOSIS — Z13.89 ENCOUNTER FOR ROUTINE SCREENING FOR MALFORMATION USING ULTRASONICS: ICD-10-CM

## 2019-03-28 PROCEDURE — 76805 OB US >/= 14 WKS SNGL FETUS: CPT | Performed by: OBSTETRICS & GYNECOLOGY

## 2019-03-28 PROCEDURE — 76817 TRANSVAGINAL US OBSTETRIC: CPT | Performed by: OBSTETRICS & GYNECOLOGY

## 2019-03-28 RX ORDER — HYDROXYPROGESTERONE CAPROATE 250 MG/ML
250 INJECTION INTRAMUSCULAR
Status: ON HOLD | COMMUNITY
End: 2019-07-21 | Stop reason: HOSPADM

## 2019-04-10 ENCOUNTER — HOSPITAL ENCOUNTER (OUTPATIENT)
Age: 29
Setting detail: SPECIMEN
Discharge: HOME | End: 2019-04-10
Payer: MEDICARE

## 2019-04-10 ENCOUNTER — ROUTINE PRENATAL (OUTPATIENT)
Dept: OBGYN CLINIC | Age: 29
End: 2019-04-10
Payer: MEDICARE

## 2019-04-10 ENCOUNTER — TELEPHONE (OUTPATIENT)
Dept: OBGYN CLINIC | Age: 29
End: 2019-04-10

## 2019-04-10 VITALS — DIASTOLIC BLOOD PRESSURE: 70 MMHG | SYSTOLIC BLOOD PRESSURE: 122 MMHG | BODY MASS INDEX: 31.83 KG/M2 | WEIGHT: 174 LBS

## 2019-04-10 DIAGNOSIS — Z3A.20 20 WEEKS GESTATION OF PREGNANCY: Primary | ICD-10-CM

## 2019-04-10 DIAGNOSIS — R30.0 DYSURIA: ICD-10-CM

## 2019-04-10 LAB
-: ABNORMAL
AMORPHOUS: ABNORMAL
BACTERIA: ABNORMAL
BILIRUBIN URINE: NEGATIVE
CASTS UA: ABNORMAL /LPF (ref 0–8)
COLOR: YELLOW
COMMENT UA: ABNORMAL
CRYSTALS, UA: ABNORMAL /HPF
EPITHELIAL CELLS UA: ABNORMAL /HPF (ref 0–5)
GLUCOSE URINE: NEGATIVE
KETONES, URINE: NEGATIVE
LEUKOCYTE ESTERASE, URINE: ABNORMAL
MUCUS: ABNORMAL
NITRITE, URINE: NEGATIVE
OTHER OBSERVATIONS UA: ABNORMAL
PH UA: 7 (ref 5–8)
PROTEIN UA: NEGATIVE
RBC UA: ABNORMAL /HPF (ref 0–4)
RENAL EPITHELIAL, UA: ABNORMAL /HPF
SPECIFIC GRAVITY UA: 1.02 (ref 1–1.03)
TRICHOMONAS: ABNORMAL
TURBIDITY: CLEAR
URINE HGB: NEGATIVE
UROBILINOGEN, URINE: NORMAL
WBC UA: ABNORMAL /HPF (ref 0–5)
YEAST: ABNORMAL

## 2019-04-10 PROCEDURE — H1001 ANTEPARTUM MANAGEMENT: HCPCS | Performed by: OBSTETRICS & GYNECOLOGY

## 2019-04-10 PROCEDURE — 99213 OFFICE O/P EST LOW 20 MIN: CPT | Performed by: OBSTETRICS & GYNECOLOGY

## 2019-04-10 PROCEDURE — G8417 CALC BMI ABV UP PARAM F/U: HCPCS | Performed by: OBSTETRICS & GYNECOLOGY

## 2019-04-10 PROCEDURE — G8427 DOCREV CUR MEDS BY ELIG CLIN: HCPCS | Performed by: OBSTETRICS & GYNECOLOGY

## 2019-04-10 PROCEDURE — 1036F TOBACCO NON-USER: CPT | Performed by: OBSTETRICS & GYNECOLOGY

## 2019-04-10 NOTE — TELEPHONE ENCOUNTER
Miles Gonsales. I haven't seen her this pregnancy so I'm not sure specifically what she's referring to, but she can see whoever she is comfortable with. She should be reminded that if she goes into labor she may not be able to pick who is doing the delivery. Thanks!

## 2019-04-10 NOTE — TELEPHONE ENCOUNTER
When checking the patient out and trying to schedule her next appt I tried to schedule her with [facility]. She refused to see her. I then offered Dr Randee Farrell explaining that she needed to see the other providers and she said she had a bad experience with her and did not want to see her at all. I scheduled her with Dr Silvana Cornejo again but thought it should be known she refuses to see the other providers. When talking with Noemy at check out she confirmed that she will only see Dr Silvana Cornejo for her appts.

## 2019-04-11 LAB
CULTURE: NORMAL
Lab: NORMAL
SPECIMEN DESCRIPTION: NORMAL

## 2019-05-12 ENCOUNTER — HOSPITAL ENCOUNTER (OUTPATIENT)
Age: 29
Discharge: HOME | End: 2019-05-12
Attending: OBSTETRICS & GYNECOLOGY | Admitting: OBSTETRICS & GYNECOLOGY
Payer: MEDICARE

## 2019-05-12 VITALS
DIASTOLIC BLOOD PRESSURE: 68 MMHG | RESPIRATION RATE: 17 BRPM | SYSTOLIC BLOOD PRESSURE: 115 MMHG | HEART RATE: 87 BPM | TEMPERATURE: 98.1 F

## 2019-05-12 PROBLEM — Z3A.24 24 WEEKS GESTATION OF PREGNANCY: Status: ACTIVE | Noted: 2019-05-12

## 2019-05-12 LAB
BILIRUBIN URINE: NEGATIVE
COLOR: YELLOW
COMMENT UA: ABNORMAL
GLUCOSE URINE: NEGATIVE
KETONES, URINE: ABNORMAL
LEUKOCYTE ESTERASE, URINE: NEGATIVE
NITRITE, URINE: NEGATIVE
PH UA: 7 (ref 5–8)
PROTEIN UA: NEGATIVE
SPECIFIC GRAVITY UA: 1.01 (ref 1–1.03)
TURBIDITY: CLEAR
URINE HGB: NEGATIVE
UROBILINOGEN, URINE: NORMAL

## 2019-05-12 PROCEDURE — 87491 CHLMYD TRACH DNA AMP PROBE: CPT

## 2019-05-12 PROCEDURE — 81003 URINALYSIS AUTO W/O SCOPE: CPT

## 2019-05-12 PROCEDURE — 99213 OFFICE O/P EST LOW 20 MIN: CPT

## 2019-05-12 PROCEDURE — 87591 N.GONORRHOEAE DNA AMP PROB: CPT

## 2019-05-12 RX ORDER — ACETAMINOPHEN 500 MG
1000 TABLET ORAL EVERY 6 HOURS PRN
Status: DISCONTINUED | OUTPATIENT
Start: 2019-05-12 | End: 2019-05-12 | Stop reason: HOSPADM

## 2019-05-12 NOTE — H&P
OBSTETRICAL HISTORY Patrick Howe    Date: 2019       Time: 1:06 AM   Patient Name: Vana Sandhoff     Patient : 1990  Room/Bed: [place] [address]    Admission Date/Time: 2019 12:59 AM    CC: Abdominal pressure, increased frequency of urination     HPI: Vana Sandhoff is a 33 y.o. [id number] at 24w5d who presents with increased frequency of urination and constant abdominal pressure over the last day. She states that she was going to bed tonight and could not get comfortable as her abdomen feels tight. She states that she has also been urinating every few minutes for the last few days and thinks that she might have a UTI. She has a history of  delivery x2 and states that she wanted to be evaluated to make sure she is not in  labor. The patient reports white vaginal discharge over the last month that has been constant and malodorous. She denies any recent intercourse and states that she only drinks 1 bottle of water per day. The patient reports fetal movement is present, denies contractions, denies loss of fluid, denies vaginal bleeding. Pregnancy is complicated by H/O sPTD x2 (G1 @ 36wks; G2 @ 34wks), Depression, Hx Chlamydia (2013 pt treated), Low PAPPA (pregnancy-associated plasma protein A), HSV2, Uterine fibroids, Obesity     DATING:  LMP: Patient's last menstrual period was 2018 (exact date).   Estimated Date of Delivery: 19   Based on: LMP    PREGNANCY RISK FACTORS:  Patient Active Problem List   Diagnosis    ASCUS (atypical squamous cells of undetermined significance) on Pap smear    HSV2    H/O sPTD x2 (G1 @ 36wks; G2 @ 34wks)    Uterine fibroids    HRP (high risk pregnancy), unspecified trimester    Depression    Chlamydia    Current pregnancy with history of pre-term labor in first trimester    Obesity affecting pregnancy in first trimester    Short interval between pregnancies complicating pregnancy, antepartum    High risk pregnancy with low PAPPA (pregnancy-associated plasma protein A)    24 weeks gestation of pregnancy        Steroids Given In This Pregnancy:  no     REVIEW OF SYSTEMS:   Constitutional: negative fever, negative chills  HEENT: negative visual disturbances, negative headaches  Respiratory: negative dyspnea, negative cough  Cardiovascular: negative chest pain,  negative palpitations  Gastrointestinal: negative abdominal pain, positive abdominal tightness, negative RUQ pain, negative N/V, negative diarrhea, negative constipation  Genitourinary: negative dysuria, negative vaginal discharge, negative vaginal bleeding. Increase in urinary frequency  Dermatological: negative rash, negative lesions, negative pruritus  Hematologic: negative bruising  Immunologic/Lymphatic: negative recent illness, negative recent sick contact  Musculoskeletal: negative back pain, negative myalgias, negative arthralgias  Neurological:  negative dizziness, negative weakness  Behavior/Psych: negative depression, negative anxiety    OBSTETRICAL HISTORY:   OB History    Para Term  AB Living   3 2 0 2 0 2   SAB TAB Ectopic Molar Multiple Live Births   0 0 0 0 0 2      # Outcome Date GA Lbr Toro/2nd Weight Sex Delivery Anes PTL Lv   3 Current            2  18 34w4d / 00:19 4 lb 8.3 oz (2.05 kg) M Vag-Spont None Y SIRISHA      Name: Westley Minaya: 8  Apgar5: 9   1  05/29/10 36w2d  4 lb 4 oz (1.928 kg) M Vag-Spont  Y SIRISHA       PAST MEDICAL HISTORY:   has a past medical history of ASCUS (atypical squamous cells of undetermined significance) on Pap smear, Atypical squamous cells of undetermined significance (ASCUS) on Papanicolaou smear of cervix, Chlamydia, Depression, Herpes simplex virus (HSV) infection, LGSIL of cervix of undetermined significance, Postpartum depression, Scoliosis, and Trauma.     PAST SURGICAL HISTORY:   has a past surgical history that includes Colposcopy (13) and [place] tooth extraction (05/2018). ALLERGIES:  has No Known Allergies. MEDICATIONS:  Prior to Admission medications    Medication Sig Start Date End Date Taking? Authorizing Provider   hydroxyprogesterone caproate 250 MG/ML OIL oil injection Inject 250 mg into the muscle every 7 days    Historical Provider, MD   Prenatal Vit-Fe Fumarate-FA (RAFY-RUL PRENATAL VITAMINS) 28-0.8 MG TABS Take 1 tablet by mouth daily 7/5/18   Tameka Moyer MD       FAMILY HISTORY:  family history includes Asthma in her sister; Cancer in her maternal grandfather; Dementia in her maternal grandmother; High Cholesterol in her mother; No Known Problems in her father and paternal grandfather; Other in her mother and paternal grandmother; Thyroid Disease in her maternal grandmother. SOCIAL HISTORY:   reports that she has never smoked. She has never used smokeless tobacco. She reports that she does not drink alcohol or use drugs. VITALS:  Vitals:    05/12/19 0110   BP: 115/68   Pulse: 87   Resp: 17   Temp: 98.1 °F (36.7 °C)   TempSrc: Oral       PHYSICAL EXAM:  Fetal Heart Monitor:  Baseline Heart Rate 145, moderate variability, absent accelerations, variable decelerations appropriate for gestational age  [place]: contractions, none    General appearance:  no apparent distress, alert and cooperative  Neurologic:  alert, oriented, normal speech, no focal findings or movement disorder noted  Lungs:  No increased work of breathing, good air exchange, clear to auscultation bilaterally, no crackles or wheezing  Heart:  regular rate and rhythm and no murmur    Abdomen:  soft, gravid, non-tender, no right upper quadrant tenderness, no CVA tenderness, uterus non-tender, no signs of abruption and no signs of chorioamnionitis  Extremities:  no calf tenderness, non edematous, DTRs: normal    Pelvic Exam:   Vulva: normal appearing vulva, no masses, tenderness or lesions, normal clitoris    Vagina: Normal appearing vagina with normal color and no lesions. time. Pt discussed with Dr. Abimbola Ortega. Plan for discharge home at this time. Due to the patients history of  labor, pt advised to maintain a low threshold for coming back to the hospital for further evaluation if she experiences any s/s of labor. Labor precautions reviewed with the patient and the patient verbalized understanding. H/O sPTD x2 (G1 @ 36wks; G2 @ 34wks)   - pt receiving enrique until 36 weeks gestation   - Continue growth scans every 3-4 weeks      Depression   - controlled, no meds   - denies SI/HI      Hx Chlamydia   - dx in  and neg TOR since that time   - GCC swab collected and sent to the lab today      Low PAPPA (pregnancy-associated plasma protein A)   - Increased risk of trisomy 18/13 but still within normal range      HSV2   - Pt is IgG positive for HSV2   - Pt denies ever having an outbreak      Uterine fibroids      Obesity       Patient Active Problem List    Diagnosis Date Noted    24 weeks gestation of pregnancy 2019    High risk pregnancy with low PAPPA (pregnancy-associated plasma protein A) 2019     Leads to an increased risk of trisomy 18/13 on first trimester screen, but still within normal range      Current pregnancy with history of pre-term labor in first trimester 2019    Obesity affecting pregnancy in first trimester 2019     ASA 81 mg/d      Short interval between pregnancies complicating pregnancy, antepartum 2019    HRP (high risk pregnancy), unspecified trimester 2018    Uterine fibroids 10/31/2017    H/O sPTD x2 (G1 @ 36wks; G2 @ 34wks) 2017     U/S q 2 weeks from 16-34 weeks, [place] until 36.6 weeks.  Advise interval fetal growth scans every 3-4 weeks for duration of pregnancy and fetal  surveillance from 32 weeks (NST twice weekly, and weekly evaluation of umbilical artery dopplers, GELA and BPP testing)        Chlamydia 2016    HSV2 2015     Patient reports she was told she had HSV 2 IgG. Reports she has never had a genital lesion in the past.       ASCUS (atypical squamous cells of undetermined significance) on Pap smear     Depression 01/01/2010     prior to del 2010         Plan discussed with Dr. Jolynn Pickard, who is agreeable. Steroids given this admission: No    Risks, benefits, alternatives and possible complications have been discussed in detail with the patient. Admission, and post admission procedures and expectations were discussed in detail. All questions were answered. Attending's Name: Dr. Tani Martínez DO  Ob/Gyn Resident  5/12/2019, 1:06 AM         Attending Physician Statement  I have discussed the care of Taurus Sosa, including pertinent history and exam findings,  with the resident. I have reviewed the key elements of all parts of the encounter with the resident. I agree with the assessment, plan and orders as documented by the resident.   (GE Modifier)

## 2019-05-12 NOTE — FLOWSHEET NOTE
Pt here via self with complaints of urinary frequency. Pt has no complaints of bleeding, leaking of fluid or contractions. Pt taken to traige 2, gown and CCUS given. EFM applied. Residents notified.

## 2019-05-13 LAB
C TRACH DNA GENITAL QL NAA+PROBE: NEGATIVE
N. GONORRHOEAE DNA: NEGATIVE

## 2019-05-23 ENCOUNTER — HOSPITAL ENCOUNTER (OUTPATIENT)
Age: 29
Setting detail: SPECIMEN
Discharge: HOME | End: 2019-05-23
Payer: MEDICARE

## 2019-05-23 ENCOUNTER — ROUTINE PRENATAL (OUTPATIENT)
Dept: OBGYN CLINIC | Age: 29
End: 2019-05-23
Payer: MEDICARE

## 2019-05-23 VITALS — DIASTOLIC BLOOD PRESSURE: 64 MMHG | BODY MASS INDEX: 32.01 KG/M2 | SYSTOLIC BLOOD PRESSURE: 106 MMHG | WEIGHT: 175 LBS

## 2019-05-23 DIAGNOSIS — O28.0 HIGH RISK PREGNANCY WITH LOW PAPPA (PREGNANCY-ASSOCIATED PLASMA PROTEIN A): ICD-10-CM

## 2019-05-23 DIAGNOSIS — R35.0 URINE FREQUENCY: ICD-10-CM

## 2019-05-23 DIAGNOSIS — O09.212 HIGH RISK PREGNANCY DUE TO HISTORY OF PRETERM LABOR IN SECOND TRIMESTER: ICD-10-CM

## 2019-05-23 DIAGNOSIS — Z3A.26 26 WEEKS GESTATION OF PREGNANCY: ICD-10-CM

## 2019-05-23 DIAGNOSIS — N89.8 VAGINAL ODOR: ICD-10-CM

## 2019-05-23 DIAGNOSIS — O09.899 HIGH RISK PREGNANCY WITH LOW PAPPA (PREGNANCY-ASSOCIATED PLASMA PROTEIN A): ICD-10-CM

## 2019-05-23 DIAGNOSIS — Z3A.26 26 WEEKS GESTATION OF PREGNANCY: Primary | ICD-10-CM

## 2019-05-23 PROCEDURE — G8417 CALC BMI ABV UP PARAM F/U: HCPCS | Performed by: OBSTETRICS & GYNECOLOGY

## 2019-05-23 PROCEDURE — G8427 DOCREV CUR MEDS BY ELIG CLIN: HCPCS | Performed by: OBSTETRICS & GYNECOLOGY

## 2019-05-23 PROCEDURE — 81003 URINALYSIS AUTO W/O SCOPE: CPT | Performed by: OBSTETRICS & GYNECOLOGY

## 2019-05-23 PROCEDURE — 1036F TOBACCO NON-USER: CPT | Performed by: OBSTETRICS & GYNECOLOGY

## 2019-05-23 PROCEDURE — 99213 OFFICE O/P EST LOW 20 MIN: CPT | Performed by: OBSTETRICS & GYNECOLOGY

## 2019-05-23 RX ORDER — NITROFURANTOIN 25; 75 MG/1; MG/1
100 CAPSULE ORAL 2 TIMES DAILY
Qty: 14 CAPSULE | Refills: 0 | Status: SHIPPED | OUTPATIENT
Start: 2019-05-23 | End: 2019-05-30

## 2019-05-23 RX ORDER — METRONIDAZOLE 500 MG/1
500 TABLET ORAL 2 TIMES DAILY
Qty: 14 TABLET | Refills: 0 | Status: SHIPPED | OUTPATIENT
Start: 2019-05-23 | End: 2019-05-30

## 2019-05-23 NOTE — PROGRESS NOTES
Chief complaint: Here for Prenatal Visit    +FM, -ctxns, -VB, -LOF    /64   Wt 175 lb (79.4 kg)   LMP 2018 (Exact Date)   BMI 32.01 kg/m²       Gen-NAD  CVS-RRR  Resp-nonlabored  Abd-soft, nontender, gravid  Ext-no edema      ICD-10-CM    1. 26 weeks gestation of pregnancy Z3A.26 CBC Auto Differential     Glucose tolerance, 1 hour     Urinalysis Reflex to Culture   2. Vaginal odor N89.8 Vaginitis DNA Probe     Was seen at [place] for vaginal discharge and cramping 19, with negative wet prep, GC/CT and urinalysis. Macrobid given for UTI symptoms. Will repeat urine cx today and send Affirm, but I treated with Flagyl based on yellowish-clear discharge on exam. No pooling, cervix closed. Tolerating Stacey injections well  [facility] recommends cervical lengths up to 32 weeks, discussed with patient that [facility] recommends stopping at 24 weeks. She would like one more cervical length US. [facility] recommending growth USNs and  testing starting at 32 weeks due to low TORI-A, pt prefers to do here. Will not go back to [facility]. NIPT negative per [facility] US  CBC and GCT order given.

## 2019-05-24 LAB
-: ABNORMAL
AMORPHOUS: ABNORMAL
BACTERIA: ABNORMAL
BILIRUBIN URINE: NEGATIVE
CASTS UA: ABNORMAL /LPF (ref 0–8)
COLOR: YELLOW
COMMENT UA: ABNORMAL
CRYSTALS, UA: ABNORMAL /HPF
CULTURE: NORMAL
DIRECT EXAM: ABNORMAL
EPITHELIAL CELLS UA: ABNORMAL /HPF (ref 0–5)
GLUCOSE URINE: NEGATIVE
KETONES, URINE: NEGATIVE
LEUKOCYTE ESTERASE, URINE: ABNORMAL
Lab: ABNORMAL
Lab: NORMAL
MUCUS: ABNORMAL
NITRITE, URINE: NEGATIVE
OTHER OBSERVATIONS UA: ABNORMAL
PH UA: 7 (ref 5–8)
PROTEIN UA: NEGATIVE
RBC UA: ABNORMAL /HPF (ref 0–4)
RENAL EPITHELIAL, UA: ABNORMAL /HPF
SPECIFIC GRAVITY UA: 1.01 (ref 1–1.03)
SPECIMEN DESCRIPTION: ABNORMAL
SPECIMEN DESCRIPTION: NORMAL
TRICHOMONAS: ABNORMAL
TURBIDITY: CLEAR
URINE HGB: NEGATIVE
UROBILINOGEN, URINE: NORMAL
WBC UA: ABNORMAL /HPF (ref 0–5)
YEAST: ABNORMAL

## 2019-06-07 ENCOUNTER — ROUTINE PRENATAL (OUTPATIENT)
Dept: OBGYN CLINIC | Age: 29
End: 2019-06-07
Payer: MEDICARE

## 2019-06-07 ENCOUNTER — PROCEDURE VISIT (OUTPATIENT)
Dept: OBGYN CLINIC | Age: 29
End: 2019-06-07
Payer: MEDICARE

## 2019-06-07 VITALS — WEIGHT: 171.8 LBS | DIASTOLIC BLOOD PRESSURE: 64 MMHG | BODY MASS INDEX: 31.42 KG/M2 | SYSTOLIC BLOOD PRESSURE: 100 MMHG

## 2019-06-07 DIAGNOSIS — Z3A.28 28 WEEKS GESTATION OF PREGNANCY: ICD-10-CM

## 2019-06-07 DIAGNOSIS — O28.0 HIGH RISK PREGNANCY WITH LOW PAPPA (PREGNANCY-ASSOCIATED PLASMA PROTEIN A): ICD-10-CM

## 2019-06-07 DIAGNOSIS — O09.219 PREVIOUS PRETERM LABOR AFFECTING PREGNANCY, ANTEPARTUM: Primary | ICD-10-CM

## 2019-06-07 DIAGNOSIS — Z3A.28 28 WEEKS GESTATION OF PREGNANCY: Primary | ICD-10-CM

## 2019-06-07 DIAGNOSIS — O09.899 HIGH RISK PREGNANCY WITH LOW PAPPA (PREGNANCY-ASSOCIATED PLASMA PROTEIN A): ICD-10-CM

## 2019-06-07 LAB
ABDOMINAL CIRCUMFERENCE: NORMAL CM
BIPARIETAL DIAMETER: NORMAL CM
ESTIMATED FETAL WEIGHT: NORMAL GRAMS
FEMORAL DIAMETER: NORMAL CM
HC/AC: NORMAL
HEAD CIRCUMFERENCE: NORMAL CM

## 2019-06-07 PROCEDURE — 1036F TOBACCO NON-USER: CPT | Performed by: OBSTETRICS & GYNECOLOGY

## 2019-06-07 PROCEDURE — 99213 OFFICE O/P EST LOW 20 MIN: CPT | Performed by: OBSTETRICS & GYNECOLOGY

## 2019-06-07 PROCEDURE — G8427 DOCREV CUR MEDS BY ELIG CLIN: HCPCS | Performed by: OBSTETRICS & GYNECOLOGY

## 2019-06-07 PROCEDURE — G8417 CALC BMI ABV UP PARAM F/U: HCPCS | Performed by: OBSTETRICS & GYNECOLOGY

## 2019-06-07 PROCEDURE — 76816 OB US FOLLOW-UP PER FETUS: CPT | Performed by: OBSTETRICS & GYNECOLOGY

## 2019-06-07 PROCEDURE — 76817 TRANSVAGINAL US OBSTETRIC: CPT | Performed by: OBSTETRICS & GYNECOLOGY

## 2019-06-07 PROCEDURE — H1002 CARECOORDINATION PRENATAL: HCPCS | Performed by: OBSTETRICS & GYNECOLOGY

## 2019-06-07 NOTE — PROGRESS NOTES
+FM, -Ctx, -LOF, -VB  Patient Active Problem List   Diagnosis    ASCUS (atypical squamous cells of undetermined significance) on Pap smear    HSV2    H/O sPTD x2 (G1 @ 36wks; G2 @ 34wks)    Uterine fibroids    HRP (high risk pregnancy), unspecified trimester    Depression    Chlamydia    Current pregnancy with history of pre-term labor in first trimester    Obesity affecting pregnancy in first trimester    Short interval between pregnancies complicating pregnancy, antepartum    High risk pregnancy with low PAPPA (pregnancy-associated plasma protein A)    24 weeks gestation of pregnancy     Blood pressure 100/64, weight 171 lb 12.8 oz (77.9 kg), last menstrual period 11/20/2018, not currently breastfeeding. Note the weight loss. She denies nausea or trying to lose weight, but she is working in a factory with a lot of walking. She is trying to work at least until the end of June.   Ultrasound shows good fetal growth, and the cervix is not 2.5-2.8 cm  She will get her GTT tomorrow

## 2019-06-20 ENCOUNTER — HOSPITAL ENCOUNTER (OUTPATIENT)
Age: 29
Setting detail: SPECIMEN
Discharge: HOME | End: 2019-06-20
Payer: MEDICARE

## 2019-06-20 ENCOUNTER — ROUTINE PRENATAL (OUTPATIENT)
Dept: OBGYN CLINIC | Age: 29
End: 2019-06-20
Payer: MEDICARE

## 2019-06-20 VITALS — DIASTOLIC BLOOD PRESSURE: 72 MMHG | SYSTOLIC BLOOD PRESSURE: 100 MMHG | BODY MASS INDEX: 32.52 KG/M2 | WEIGHT: 177.8 LBS

## 2019-06-20 DIAGNOSIS — Z3A.30 30 WEEKS GESTATION OF PREGNANCY: Primary | ICD-10-CM

## 2019-06-20 DIAGNOSIS — O26.893 VAGINAL DISCHARGE DURING PREGNANCY IN THIRD TRIMESTER: ICD-10-CM

## 2019-06-20 DIAGNOSIS — N89.8 VAGINAL DISCHARGE DURING PREGNANCY IN THIRD TRIMESTER: ICD-10-CM

## 2019-06-20 DIAGNOSIS — Z3A.30 30 WEEKS GESTATION OF PREGNANCY: ICD-10-CM

## 2019-06-20 LAB
DIRECT EXAM: NORMAL
Lab: NORMAL
SPECIMEN DESCRIPTION: NORMAL

## 2019-06-20 PROCEDURE — 99213 OFFICE O/P EST LOW 20 MIN: CPT | Performed by: OBSTETRICS & GYNECOLOGY

## 2019-06-20 PROCEDURE — 1036F TOBACCO NON-USER: CPT | Performed by: OBSTETRICS & GYNECOLOGY

## 2019-06-20 PROCEDURE — G8417 CALC BMI ABV UP PARAM F/U: HCPCS | Performed by: OBSTETRICS & GYNECOLOGY

## 2019-06-20 PROCEDURE — G8427 DOCREV CUR MEDS BY ELIG CLIN: HCPCS | Performed by: OBSTETRICS & GYNECOLOGY

## 2019-06-20 NOTE — PROGRESS NOTES
+FM, -Ctx, -LOF, -VB  Patient Active Problem List   Diagnosis    ASCUS (atypical squamous cells of undetermined significance) on Pap smear    HSV2    H/O sPTD x2 (G1 @ 36wks; G2 @ 34wks)    Uterine fibroids    HRP (high risk pregnancy), unspecified trimester    Depression    Chlamydia    Current pregnancy with history of pre-term labor in first trimester    Obesity affecting pregnancy in first trimester    Short interval between pregnancies complicating pregnancy, antepartum    High risk pregnancy with low PAPPA (pregnancy-associated plasma protein A)    24 weeks gestation of pregnancy     Blood pressure 100/72, weight 177 lb 12.8 oz (80.6 kg), last menstrual period 2018, not currently breastfeeding.   The patient reported feeling wet at times, so I did a nitrazine which is negative, and sent a vaginitis DNA probe  We will start  testing with her next visit

## 2019-07-16 ENCOUNTER — PROCEDURE VISIT (OUTPATIENT)
Dept: OBGYN CLINIC | Age: 29
End: 2019-07-16
Payer: MEDICARE

## 2019-07-16 ENCOUNTER — HOSPITAL ENCOUNTER (OUTPATIENT)
Age: 29
Setting detail: SPECIMEN
Discharge: HOME | End: 2019-07-16
Payer: MEDICARE

## 2019-07-16 ENCOUNTER — ROUTINE PRENATAL (OUTPATIENT)
Dept: OBGYN CLINIC | Age: 29
End: 2019-07-16
Payer: MEDICARE

## 2019-07-16 VITALS — DIASTOLIC BLOOD PRESSURE: 68 MMHG | WEIGHT: 182 LBS | BODY MASS INDEX: 33.29 KG/M2 | SYSTOLIC BLOOD PRESSURE: 112 MMHG

## 2019-07-16 DIAGNOSIS — O28.0 HIGH RISK PREGNANCY WITH LOW PAPPA: Primary | ICD-10-CM

## 2019-07-16 DIAGNOSIS — O28.0 HIGH RISK PREGNANCY WITH LOW PAPPA (PREGNANCY-ASSOCIATED PLASMA PROTEIN A): ICD-10-CM

## 2019-07-16 DIAGNOSIS — O09.213 PREVIOUS PRETERM DELIVERY, ANTEPARTUM, THIRD TRIMESTER: ICD-10-CM

## 2019-07-16 DIAGNOSIS — O09.899 HIGH RISK PREGNANCY WITH LOW PAPPA: Primary | ICD-10-CM

## 2019-07-16 DIAGNOSIS — O09.899 HIGH RISK PREGNANCY WITH LOW PAPPA (PREGNANCY-ASSOCIATED PLASMA PROTEIN A): ICD-10-CM

## 2019-07-16 DIAGNOSIS — Z3A.34 34 WEEKS GESTATION OF PREGNANCY: ICD-10-CM

## 2019-07-16 DIAGNOSIS — R82.998 DARK BROWN-COLORED URINE: ICD-10-CM

## 2019-07-16 DIAGNOSIS — R82.998 DARK BROWN-COLORED URINE: Primary | ICD-10-CM

## 2019-07-16 LAB
-: ABNORMAL
AMORPHOUS: ABNORMAL
BACTERIA: ABNORMAL
BILIRUBIN URINE: NEGATIVE
CASTS UA: ABNORMAL /LPF (ref 0–2)
COLOR: ABNORMAL
COMMENT UA: ABNORMAL
CRYSTALS, UA: ABNORMAL /HPF
CRYSTALS, UA: ABNORMAL /HPF
EPITHELIAL CELLS UA: ABNORMAL /HPF (ref 0–5)
GLUCOSE URINE: NEGATIVE
KETONES, URINE: ABNORMAL
LEUKOCYTE ESTERASE, URINE: ABNORMAL
MUCUS: ABNORMAL
NITRITE, URINE: NEGATIVE
OTHER OBSERVATIONS UA: ABNORMAL
PH UA: 6 (ref 5–8)
PROTEIN UA: ABNORMAL
RBC UA: ABNORMAL /HPF (ref 0–2)
RENAL EPITHELIAL, UA: ABNORMAL /HPF
SPECIFIC GRAVITY UA: 1.03 (ref 1–1.03)
TRICHOMONAS: ABNORMAL
TURBIDITY: ABNORMAL
URINE HGB: NEGATIVE
UROBILINOGEN, URINE: NORMAL
WBC UA: ABNORMAL /HPF (ref 0–5)
YEAST: ABNORMAL

## 2019-07-16 PROCEDURE — 59025 FETAL NON-STRESS TEST: CPT | Performed by: OBSTETRICS & GYNECOLOGY

## 2019-07-16 PROCEDURE — 76818 FETAL BIOPHYS PROFILE W/NST: CPT | Performed by: OBSTETRICS & GYNECOLOGY

## 2019-07-17 LAB
CULTURE: NORMAL
Lab: NORMAL
SPECIMEN DESCRIPTION: NORMAL

## 2019-07-17 RX ORDER — NITROFURANTOIN 25; 75 MG/1; MG/1
100 CAPSULE ORAL 2 TIMES DAILY
Qty: 20 CAPSULE | Refills: 0 | Status: ON HOLD | OUTPATIENT
Start: 2019-07-17 | End: 2019-07-21 | Stop reason: HOSPADM

## 2019-07-19 ENCOUNTER — HOSPITAL ENCOUNTER (OUTPATIENT)
Age: 29
Setting detail: SPECIMEN
Discharge: HOME | End: 2019-07-19
Payer: MEDICARE

## 2019-07-19 ENCOUNTER — ROUTINE PRENATAL (OUTPATIENT)
Dept: OBGYN CLINIC | Age: 29
End: 2019-07-19
Payer: MEDICARE

## 2019-07-19 ENCOUNTER — HOSPITAL ENCOUNTER (INPATIENT)
Age: 29
LOS: 3 days | Discharge: HOME | DRG: 560 | End: 2019-07-22
Attending: OBSTETRICS & GYNECOLOGY | Admitting: OBSTETRICS & GYNECOLOGY
Payer: MEDICARE

## 2019-07-19 VITALS — DIASTOLIC BLOOD PRESSURE: 62 MMHG | BODY MASS INDEX: 31.93 KG/M2 | SYSTOLIC BLOOD PRESSURE: 104 MMHG | WEIGHT: 174.6 LBS

## 2019-07-19 DIAGNOSIS — R30.0 DYSURIA DURING PREGNANCY IN THIRD TRIMESTER: ICD-10-CM

## 2019-07-19 DIAGNOSIS — O26.893 DYSURIA DURING PREGNANCY IN THIRD TRIMESTER: ICD-10-CM

## 2019-07-19 DIAGNOSIS — Z3A.26 26 WEEKS GESTATION OF PREGNANCY: ICD-10-CM

## 2019-07-19 DIAGNOSIS — O09.899 HIGH RISK PREGNANCY WITH LOW PAPPA (PREGNANCY-ASSOCIATED PLASMA PROTEIN A): ICD-10-CM

## 2019-07-19 DIAGNOSIS — O28.0 HIGH RISK PREGNANCY WITH LOW PAPPA (PREGNANCY-ASSOCIATED PLASMA PROTEIN A): ICD-10-CM

## 2019-07-19 DIAGNOSIS — Z3A.34 34 WEEKS GESTATION OF PREGNANCY: Primary | ICD-10-CM

## 2019-07-19 PROBLEM — Z3A.24 24 WEEKS GESTATION OF PREGNANCY: Status: RESOLVED | Noted: 2019-05-12 | Resolved: 2019-07-19

## 2019-07-19 LAB
-: ABNORMAL
ABSOLUTE EOS #: 0.19 K/UL (ref 0–0.44)
ABSOLUTE IMMATURE GRANULOCYTE: 0.06 K/UL (ref 0–0.3)
ABSOLUTE LYMPH #: 2 K/UL (ref 1.1–3.7)
ABSOLUTE MONO #: 0.67 K/UL (ref 0.1–1.2)
AMORPHOUS: ABNORMAL
BACTERIA: ABNORMAL
BASOPHILS # BLD: 0 % (ref 0–2)
BASOPHILS ABSOLUTE: 0.03 K/UL (ref 0–0.2)
BILIRUBIN URINE: NEGATIVE
CASTS UA: ABNORMAL /LPF (ref 0–2)
COLOR: YELLOW
COMMENT UA: ABNORMAL
CRYSTALS, UA: ABNORMAL /HPF
DIFFERENTIAL TYPE: ABNORMAL
EOSINOPHILS RELATIVE PERCENT: 2 % (ref 1–4)
EPITHELIAL CELLS UA: ABNORMAL /HPF (ref 0–5)
GLUCOSE ADMINISTRATION: NORMAL
GLUCOSE TOLERANCE SCREEN 50G: 109 MG/DL (ref 70–135)
GLUCOSE URINE: NEGATIVE
HCT VFR BLD CALC: 40.3 % (ref 36.3–47.1)
HEMOGLOBIN: 12.6 G/DL (ref 11.9–15.1)
IMMATURE GRANULOCYTES: 1 %
KETONES, URINE: NEGATIVE
LEUKOCYTE ESTERASE, URINE: ABNORMAL
LYMPHOCYTES # BLD: 16 % (ref 24–43)
MCH RBC QN AUTO: 27.3 PG (ref 25.2–33.5)
MCHC RBC AUTO-ENTMCNC: 31.3 G/DL (ref 28.4–34.8)
MCV RBC AUTO: 87.4 FL (ref 82.6–102.9)
MONOCYTES # BLD: 5 % (ref 3–12)
MUCUS: ABNORMAL
NITRITE, URINE: NEGATIVE
NRBC AUTOMATED: 0 PER 100 WBC
OTHER OBSERVATIONS UA: ABNORMAL
PDW BLD-RTO: 12.4 % (ref 11.8–14.4)
PH UA: 8.5 (ref 5–8)
PLATELET # BLD: 234 K/UL (ref 138–453)
PLATELET ESTIMATE: ABNORMAL
PMV BLD AUTO: 12.1 FL (ref 8.1–13.5)
PROTEIN UA: ABNORMAL
RBC # BLD: 4.61 M/UL (ref 3.95–5.11)
RBC # BLD: ABNORMAL 10*6/UL
RBC UA: ABNORMAL /HPF (ref 0–4)
RENAL EPITHELIAL, UA: ABNORMAL /HPF
SEG NEUTROPHILS: 76 % (ref 36–65)
SEGMENTED NEUTROPHILS ABSOLUTE COUNT: 9.67 K/UL (ref 1.5–8.1)
SPECIFIC GRAVITY UA: 1.01 (ref 1–1.03)
TRICHOMONAS: ABNORMAL
TURBIDITY: ABNORMAL
URINE HGB: ABNORMAL
UROBILINOGEN, URINE: NORMAL
WBC # BLD: 12.6 K/UL (ref 3.5–11.3)
WBC # BLD: ABNORMAL 10*3/UL
WBC UA: ABNORMAL /HPF (ref 0–5)
YEAST: ABNORMAL

## 2019-07-19 PROCEDURE — G8417 CALC BMI ABV UP PARAM F/U: HCPCS | Performed by: OBSTETRICS & GYNECOLOGY

## 2019-07-19 PROCEDURE — 59025 FETAL NON-STRESS TEST: CPT | Performed by: OBSTETRICS & GYNECOLOGY

## 2019-07-19 PROCEDURE — 6370000000 HC RX 637 (ALT 250 FOR IP): Performed by: STUDENT IN AN ORGANIZED HEALTH CARE EDUCATION/TRAINING PROGRAM

## 2019-07-19 PROCEDURE — 1220000000 HC SEMI PRIVATE OB R&B

## 2019-07-19 PROCEDURE — G8428 CUR MEDS NOT DOCUMENT: HCPCS | Performed by: OBSTETRICS & GYNECOLOGY

## 2019-07-19 PROCEDURE — 99213 OFFICE O/P EST LOW 20 MIN: CPT | Performed by: OBSTETRICS & GYNECOLOGY

## 2019-07-19 PROCEDURE — 1036F TOBACCO NON-USER: CPT | Performed by: OBSTETRICS & GYNECOLOGY

## 2019-07-19 RX ORDER — ACETAMINOPHEN 500 MG
1000 TABLET ORAL EVERY 6 HOURS PRN
Status: DISCONTINUED | OUTPATIENT
Start: 2019-07-19 | End: 2019-07-20

## 2019-07-19 RX ORDER — DIPHENHYDRAMINE HCL 25 MG
25 TABLET ORAL EVERY 4 HOURS PRN
Status: DISCONTINUED | OUTPATIENT
Start: 2019-07-19 | End: 2019-07-20

## 2019-07-19 RX ORDER — SODIUM CHLORIDE, SODIUM LACTATE, POTASSIUM CHLORIDE, CALCIUM CHLORIDE 600; 310; 30; 20 MG/100ML; MG/100ML; MG/100ML; MG/100ML
INJECTION, SOLUTION INTRAVENOUS CONTINUOUS
Status: DISCONTINUED | OUTPATIENT
Start: 2019-07-20 | End: 2019-07-20

## 2019-07-19 RX ORDER — SODIUM CHLORIDE 0.9 % (FLUSH) 0.9 %
10 SYRINGE (ML) INJECTION EVERY 12 HOURS SCHEDULED
Status: DISCONTINUED | OUTPATIENT
Start: 2019-07-20 | End: 2019-07-20

## 2019-07-19 RX ORDER — ONDANSETRON 2 MG/ML
4 INJECTION INTRAMUSCULAR; INTRAVENOUS EVERY 6 HOURS PRN
Status: DISCONTINUED | OUTPATIENT
Start: 2019-07-19 | End: 2019-07-20

## 2019-07-19 RX ORDER — SODIUM CHLORIDE 0.9 % (FLUSH) 0.9 %
10 SYRINGE (ML) INJECTION PRN
Status: DISCONTINUED | OUTPATIENT
Start: 2019-07-19 | End: 2019-07-20

## 2019-07-19 RX ADMIN — ACETAMINOPHEN 1000 MG: 500 TABLET ORAL at 23:44

## 2019-07-19 ASSESSMENT — PAIN SCALES - GENERAL: PAINLEVEL_OUTOF10: 2

## 2019-07-20 PROBLEM — R89.9 ABNORMAL LABORATORY TEST: Status: ACTIVE | Noted: 2019-07-20

## 2019-07-20 PROBLEM — O60.03 PRETERM LABOR IN THIRD TRIMESTER: Status: ACTIVE | Noted: 2019-07-20

## 2019-07-20 LAB
ABO/RH: NORMAL
ABSOLUTE EOS #: 0.28 K/UL (ref 0–0.44)
ABSOLUTE IMMATURE GRANULOCYTE: 0.06 K/UL (ref 0–0.3)
ABSOLUTE LYMPH #: 3.17 K/UL (ref 1.1–3.7)
ABSOLUTE MONO #: 1.06 K/UL (ref 0.1–1.2)
AMPHETAMINE SCREEN URINE: NEGATIVE
ANTIBODY SCREEN: NEGATIVE
ARM BAND NUMBER: NORMAL
BARBITURATE SCREEN URINE: NEGATIVE
BASOPHILS # BLD: 0 % (ref 0–2)
BASOPHILS ABSOLUTE: 0.04 K/UL (ref 0–0.2)
BENZODIAZEPINE SCREEN, URINE: NEGATIVE
BUPRENORPHINE URINE: NORMAL
CANNABINOID SCREEN URINE: NEGATIVE
COCAINE METABOLITE, URINE: NEGATIVE
CULTURE: NORMAL
DIFFERENTIAL TYPE: ABNORMAL
EOSINOPHILS RELATIVE PERCENT: 2 % (ref 1–4)
EXPIRATION DATE: NORMAL
HCT VFR BLD CALC: 40.5 % (ref 36.3–47.1)
HEMOGLOBIN: 13.2 G/DL (ref 11.9–15.1)
IMMATURE GRANULOCYTES: 0 %
LYMPHOCYTES # BLD: 20 % (ref 24–43)
Lab: NORMAL
MCH RBC QN AUTO: 27.9 PG (ref 25.2–33.5)
MCHC RBC AUTO-ENTMCNC: 32.6 G/DL (ref 28.4–34.8)
MCV RBC AUTO: 85.6 FL (ref 82.6–102.9)
MDMA URINE: NORMAL
METHADONE SCREEN, URINE: NEGATIVE
METHAMPHETAMINE, URINE: NORMAL
MONOCYTES # BLD: 7 % (ref 3–12)
NRBC AUTOMATED: 0 PER 100 WBC
OPIATES, URINE: NEGATIVE
OXYCODONE SCREEN URINE: NEGATIVE
PDW BLD-RTO: 12.2 % (ref 11.8–14.4)
PHENCYCLIDINE, URINE: NEGATIVE
PLATELET # BLD: 237 K/UL (ref 138–453)
PLATELET ESTIMATE: ABNORMAL
PMV BLD AUTO: 11.7 FL (ref 8.1–13.5)
PROPOXYPHENE, URINE: NORMAL
RBC # BLD: 4.73 M/UL (ref 3.95–5.11)
RBC # BLD: ABNORMAL 10*6/UL
SEG NEUTROPHILS: 71 % (ref 36–65)
SEGMENTED NEUTROPHILS ABSOLUTE COUNT: 11.22 K/UL (ref 1.5–8.1)
SPECIMEN DESCRIPTION: NORMAL
T. PALLIDUM, IGG: NONREACTIVE
TEST INFORMATION: NORMAL
TRICYCLIC ANTIDEPRESSANTS, UR: NORMAL
WBC # BLD: 15.8 K/UL (ref 3.5–11.3)
WBC # BLD: ABNORMAL 10*3/UL

## 2019-07-20 PROCEDURE — 6360000002 HC RX W HCPCS: Performed by: STUDENT IN AN ORGANIZED HEALTH CARE EDUCATION/TRAINING PROGRAM

## 2019-07-20 PROCEDURE — 1220000000 HC SEMI PRIVATE OB R&B

## 2019-07-20 PROCEDURE — 6370000000 HC RX 637 (ALT 250 FOR IP): Performed by: STUDENT IN AN ORGANIZED HEALTH CARE EDUCATION/TRAINING PROGRAM

## 2019-07-20 PROCEDURE — 80307 DRUG TEST PRSMV CHEM ANLYZR: CPT

## 2019-07-20 PROCEDURE — 2580000003 HC RX 258: Performed by: STUDENT IN AN ORGANIZED HEALTH CARE EDUCATION/TRAINING PROGRAM

## 2019-07-20 PROCEDURE — 96372 THER/PROPH/DIAG INJ SC/IM: CPT

## 2019-07-20 PROCEDURE — 86850 RBC ANTIBODY SCREEN: CPT

## 2019-07-20 PROCEDURE — 87081 CULTURE SCREEN ONLY: CPT

## 2019-07-20 PROCEDURE — 85025 COMPLETE CBC W/AUTO DIFF WBC: CPT

## 2019-07-20 PROCEDURE — 86780 TREPONEMA PALLIDUM: CPT

## 2019-07-20 PROCEDURE — 59409 OBSTETRICAL CARE: CPT | Performed by: OBSTETRICS & GYNECOLOGY

## 2019-07-20 PROCEDURE — 88307 TISSUE EXAM BY PATHOLOGIST: CPT

## 2019-07-20 PROCEDURE — 86901 BLOOD TYPING SEROLOGIC RH(D): CPT

## 2019-07-20 PROCEDURE — 7200000001 HC VAGINAL DELIVERY

## 2019-07-20 PROCEDURE — 86900 BLOOD TYPING SEROLOGIC ABO: CPT

## 2019-07-20 RX ORDER — SIMETHICONE 80 MG
80 TABLET,CHEWABLE ORAL EVERY 6 HOURS PRN
Status: DISCONTINUED | OUTPATIENT
Start: 2019-07-20 | End: 2019-07-22 | Stop reason: HOSPADM

## 2019-07-20 RX ORDER — NALBUPHINE HCL 10 MG/ML
10 AMPUL (ML) INJECTION ONCE
Status: COMPLETED | OUTPATIENT
Start: 2019-07-20 | End: 2019-07-20

## 2019-07-20 RX ORDER — BETAMETHASONE SODIUM PHOSPHATE AND BETAMETHASONE ACETATE 3; 3 MG/ML; MG/ML
12 INJECTION, SUSPENSION INTRA-ARTICULAR; INTRALESIONAL; INTRAMUSCULAR; SOFT TISSUE ONCE
Status: DISCONTINUED | OUTPATIENT
Start: 2019-07-21 | End: 2019-07-20

## 2019-07-20 RX ORDER — ONDANSETRON 4 MG/1
4 TABLET, FILM COATED ORAL EVERY 4 HOURS PRN
Status: DISCONTINUED | OUTPATIENT
Start: 2019-07-20 | End: 2019-07-22 | Stop reason: HOSPADM

## 2019-07-20 RX ORDER — BETAMETHASONE SODIUM PHOSPHATE AND BETAMETHASONE ACETATE 3; 3 MG/ML; MG/ML
12 INJECTION, SUSPENSION INTRA-ARTICULAR; INTRALESIONAL; INTRAMUSCULAR; SOFT TISSUE ONCE
Status: COMPLETED | OUTPATIENT
Start: 2019-07-20 | End: 2019-07-20

## 2019-07-20 RX ORDER — ACETAMINOPHEN 500 MG
1000 TABLET ORAL EVERY 6 HOURS PRN
Status: DISCONTINUED | OUTPATIENT
Start: 2019-07-20 | End: 2019-07-22 | Stop reason: HOSPADM

## 2019-07-20 RX ORDER — DOCUSATE SODIUM 100 MG/1
100 CAPSULE, LIQUID FILLED ORAL 2 TIMES DAILY
Status: DISCONTINUED | OUTPATIENT
Start: 2019-07-20 | End: 2019-07-22 | Stop reason: HOSPADM

## 2019-07-20 RX ORDER — IBUPROFEN 800 MG/1
800 TABLET ORAL EVERY 8 HOURS PRN
Status: DISCONTINUED | OUTPATIENT
Start: 2019-07-20 | End: 2019-07-22 | Stop reason: HOSPADM

## 2019-07-20 RX ORDER — LANOLIN 100 %
OINTMENT (GRAM) TOPICAL PRN
Status: DISCONTINUED | OUTPATIENT
Start: 2019-07-20 | End: 2019-07-22 | Stop reason: HOSPADM

## 2019-07-20 RX ORDER — NITROFURANTOIN 25; 75 MG/1; MG/1
100 CAPSULE ORAL 2 TIMES DAILY
Status: DISCONTINUED | OUTPATIENT
Start: 2019-07-20 | End: 2019-07-21

## 2019-07-20 RX ADMIN — NALBUPHINE HYDROCHLORIDE 10 MG: 10 INJECTION, SOLUTION INTRAMUSCULAR; INTRAVENOUS; SUBCUTANEOUS at 01:12

## 2019-07-20 RX ADMIN — IBUPROFEN 800 MG: 800 TABLET, FILM COATED ORAL at 14:03

## 2019-07-20 RX ADMIN — BETAMETHASONE SODIUM PHOSPHATE AND BETAMETHASONE ACETATE 12 MG: 3; 3 INJECTION, SUSPENSION INTRA-ARTICULAR; INTRALESIONAL; INTRAMUSCULAR at 00:29

## 2019-07-20 RX ADMIN — NITROFURANTOIN MONOHYDRATE/MACROCRYSTALLINE 100 MG: 25; 75 CAPSULE ORAL at 14:01

## 2019-07-20 RX ADMIN — IBUPROFEN 800 MG: 800 TABLET, FILM COATED ORAL at 04:48

## 2019-07-20 RX ADMIN — DOCUSATE SODIUM 100 MG: 100 CAPSULE, LIQUID FILLED ORAL at 22:15

## 2019-07-20 RX ADMIN — NITROFURANTOIN MONOHYDRATE/MACROCRYSTALLINE 100 MG: 25; 75 CAPSULE ORAL at 22:15

## 2019-07-20 RX ADMIN — SODIUM CHLORIDE, POTASSIUM CHLORIDE, SODIUM LACTATE AND CALCIUM CHLORIDE: 600; 310; 30; 20 INJECTION, SOLUTION INTRAVENOUS at 00:23

## 2019-07-20 RX ADMIN — DEXTROSE MONOHYDRATE 5 MILLION UNITS: 5 INJECTION INTRAVENOUS at 00:34

## 2019-07-20 RX ADMIN — IBUPROFEN 800 MG: 800 TABLET, FILM COATED ORAL at 22:15

## 2019-07-20 RX ADMIN — ACETAMINOPHEN 1000 MG: 500 TABLET ORAL at 22:15

## 2019-07-20 RX ADMIN — DOCUSATE SODIUM 100 MG: 100 CAPSULE, LIQUID FILLED ORAL at 11:49

## 2019-07-20 RX ADMIN — Medication 50 MILLI-UNITS/MIN: at 03:55

## 2019-07-20 ASSESSMENT — PAIN DESCRIPTION - PAIN TYPE
TYPE: ACUTE PAIN
TYPE: ACUTE PAIN

## 2019-07-20 ASSESSMENT — PAIN SCALES - GENERAL
PAINLEVEL_OUTOF10: 3
PAINLEVEL_OUTOF10: 5
PAINLEVEL_OUTOF10: 2
PAINLEVEL_OUTOF10: 4
PAINLEVEL_OUTOF10: 5
PAINLEVEL_OUTOF10: 4

## 2019-07-20 ASSESSMENT — PAIN DESCRIPTION - PROGRESSION
CLINICAL_PROGRESSION: GRADUALLY WORSENING
CLINICAL_PROGRESSION: GRADUALLY WORSENING

## 2019-07-20 ASSESSMENT — PAIN - FUNCTIONAL ASSESSMENT
PAIN_FUNCTIONAL_ASSESSMENT: ACTIVITIES ARE NOT PREVENTED
PAIN_FUNCTIONAL_ASSESSMENT: ACTIVITIES ARE NOT PREVENTED

## 2019-07-20 ASSESSMENT — PAIN DESCRIPTION - ORIENTATION
ORIENTATION: RIGHT;LEFT
ORIENTATION: RIGHT;LEFT

## 2019-07-20 ASSESSMENT — PAIN DESCRIPTION - FREQUENCY
FREQUENCY: INTERMITTENT
FREQUENCY: INTERMITTENT

## 2019-07-20 ASSESSMENT — PAIN DESCRIPTION - ONSET
ONSET: PROGRESSIVE
ONSET: PROGRESSIVE

## 2019-07-20 ASSESSMENT — PAIN DESCRIPTION - LOCATION
LOCATION: ABDOMEN
LOCATION: ABDOMEN

## 2019-07-20 ASSESSMENT — PAIN DESCRIPTION - DESCRIPTORS
DESCRIPTORS: CRAMPING;ACHING
DESCRIPTORS: CRAMPING;ACHING

## 2019-07-20 NOTE — FLOWSHEET NOTE
Meets discharge criteria. Iv heplocked. Voids again without difficulty. To 752 per wc. Requests to go to nicu after getting settled on PP.

## 2019-07-20 NOTE — H&P
OBSTETRICAL HISTORY Patrick Howe    Date: 2019       Time: 11:38 PM   Patient Name: Alannah Section     Patient : 1990  Room/Bed: [place] [address]    Admission Date/Time: 2019 11:25 PM      CC: Contractions     HPI: Alannah Cifuentes is a 33 y.o. [id number] at 34w3d who presents with complaint of contractions since this morning. Patient denies any headache, visual changes, difficulty breathing, RUQ pain, N/V, F/C, and pain/swelling in lower extremities. Denies any dysuria or vaginal discharge. The patient reports fetal movement is present, denies loss of fluid, denies vaginal bleeding. DATING:  LMP: Patient's last menstrual period was 2018 (exact date).   Estimated Date of Delivery: 19   Based on: LMP    PREGNANCY RISK FACTORS:  Patient Active Problem List   Diagnosis    ASCUS (atypical squamous cells of undetermined significance) on Pap smear    HSV2    H/O sPTD x2 (G1 @ 36wks; G2 @ 34wks)    Uterine fibroids    HRP (high risk pregnancy), unspecified trimester    Depression    Chlamydia    Current pregnancy with history of pre-term labor in first trimester    Obesity affecting pregnancy in first trimester    Short interval between pregnancies complicating pregnancy, antepartum    High risk pregnancy with low PAPPA (pregnancy-associated plasma protein A)    24 weeks gestation of pregnancy    34 weeks gestation of pregnancy        Steroids Given In This Pregnancy:  First dose today     REVIEW OF SYSTEMS:   Constitutional: negative fever, negative chills, negative weight changes   HEENT: negative visual disturbances, negative headaches, negative dizziness, negative hearing loss  Breast: Negative breast abnormalities, negative breast lumps, negative nipple discharge  Respiratory: negative dyspnea, negative cough, negative SOB  Cardiovascular: negative chest pain,  negative palpitations, negative arrhythmia, negative syncope negative, findings as noted above   - Celestone ordered, repeat dose in 24 hours if able    Hx of sPTD (G1 @36w; G2 @34w)   - Patient had been receiving [place] weekly    HSV2 IgG + (neg SSE)   - Patient reports positive serologies   - Culture negative on 13   - Patient denies every having genital outbreak or every needing medications   - SSE on admission negative    Remote Hx of Chlamydia (TOR neg)      Low PAPPA    - Increased risk of trisomy 18/13 but still within normal range      Uterine fibroids      Hx of Depression   - Stable on no medications   - Denies SI/HI      Short interval pregnancy      BMI 32    Patient Active Problem List    Diagnosis Date Noted    34 weeks gestation of pregnancy 2019    24 weeks gestation of pregnancy 2019    High risk pregnancy with low PAPPA (pregnancy-associated plasma protein A) 2019     Leads to an increased risk of trisomy 18/13 on first trimester screen, but still within normal range      Current pregnancy with history of pre-term labor in first trimester 2019    Obesity affecting pregnancy in first trimester 2019     ASA 81 mg/d      Short interval between pregnancies complicating pregnancy, antepartum 2019    HRP (high risk pregnancy), unspecified trimester 2018    Uterine fibroids 10/31/2017    H/O sPTD x2 (G1 @ 36wks; G2 @ 34wks) 2017     U/S q 2 weeks from 16-34 weeks, [place] until 36.6 weeks. Advise interval fetal growth scans every 3-4 weeks for duration of pregnancy and fetal  surveillance from 32 weeks (NST twice weekly, and weekly evaluation of umbilical artery dopplers, GELA and BPP testing)        Chlamydia 2016    HSV2 2015     Patient reports she was told she had HSV 2 IgG.  Reports she has never had a genital lesion in the past.       ASCUS (atypical squamous cells of undetermined significance) on Pap smear     Depression 2010     prior to del          Plan discussed with Dr. Ashu Petty, who is agreeable. Steroids given this admission: No    Risks, benefits, alternatives and possible complications have been discussed in detail with the patient. Admission, and post admission procedures and expectations were discussed in detail. All questions were answered.     Attending's Name: Dr. Calin Ramirez DO  Ob/Gyn Resident  7/19/2019, 11:38 PM

## 2019-07-21 PROCEDURE — 6370000000 HC RX 637 (ALT 250 FOR IP): Performed by: STUDENT IN AN ORGANIZED HEALTH CARE EDUCATION/TRAINING PROGRAM

## 2019-07-21 PROCEDURE — 1220000000 HC SEMI PRIVATE OB R&B

## 2019-07-21 RX ORDER — PSEUDOEPHEDRINE HCL 30 MG
100 TABLET ORAL 2 TIMES DAILY
Qty: 60 CAPSULE | Refills: 1 | Status: SHIPPED | OUTPATIENT
Start: 2019-07-21 | End: 2019-07-21

## 2019-07-21 RX ORDER — PSEUDOEPHEDRINE HCL 30 MG
100 TABLET ORAL 2 TIMES DAILY
Qty: 60 CAPSULE | Refills: 1 | Status: SHIPPED | OUTPATIENT
Start: 2019-07-21 | End: 2019-08-06

## 2019-07-21 RX ORDER — IBUPROFEN 800 MG/1
800 TABLET ORAL EVERY 8 HOURS PRN
Qty: 30 TABLET | Refills: 0 | Status: SHIPPED | OUTPATIENT
Start: 2019-07-21 | End: 2019-07-21

## 2019-07-21 RX ORDER — IBUPROFEN 800 MG/1
800 TABLET ORAL EVERY 8 HOURS PRN
Qty: 30 TABLET | Refills: 0 | Status: SHIPPED | OUTPATIENT
Start: 2019-07-21 | End: 2019-08-06

## 2019-07-21 RX ADMIN — ACETAMINOPHEN 1000 MG: 500 TABLET ORAL at 04:00

## 2019-07-21 RX ADMIN — DOCUSATE SODIUM 100 MG: 100 CAPSULE, LIQUID FILLED ORAL at 20:05

## 2019-07-21 RX ADMIN — DOCUSATE SODIUM 100 MG: 100 CAPSULE, LIQUID FILLED ORAL at 09:40

## 2019-07-21 RX ADMIN — ACETAMINOPHEN 1000 MG: 500 TABLET ORAL at 20:10

## 2019-07-21 RX ADMIN — NITROFURANTOIN MONOHYDRATE/MACROCRYSTALLINE 100 MG: 25; 75 CAPSULE ORAL at 09:40

## 2019-07-21 RX ADMIN — IBUPROFEN 800 MG: 800 TABLET, FILM COATED ORAL at 09:40

## 2019-07-21 RX ADMIN — IBUPROFEN 800 MG: 800 TABLET, FILM COATED ORAL at 20:05

## 2019-07-21 ASSESSMENT — PAIN DESCRIPTION - FREQUENCY
FREQUENCY: INTERMITTENT
FREQUENCY: INTERMITTENT

## 2019-07-21 ASSESSMENT — PAIN DESCRIPTION - DESCRIPTORS
DESCRIPTORS: CRAMPING;ACHING
DESCRIPTORS: ACHING

## 2019-07-21 ASSESSMENT — PAIN DESCRIPTION - ONSET
ONSET: ON-GOING
ONSET: PROGRESSIVE

## 2019-07-21 ASSESSMENT — PAIN SCALES - GENERAL
PAINLEVEL_OUTOF10: 0
PAINLEVEL_OUTOF10: 0
PAINLEVEL_OUTOF10: 3
PAINLEVEL_OUTOF10: 4
PAINLEVEL_OUTOF10: 3
PAINLEVEL_OUTOF10: 6

## 2019-07-21 ASSESSMENT — PAIN DESCRIPTION - LOCATION
LOCATION: ABDOMEN
LOCATION: ABDOMEN

## 2019-07-21 ASSESSMENT — PAIN DESCRIPTION - PAIN TYPE
TYPE: ACUTE PAIN
TYPE: ACUTE PAIN

## 2019-07-21 ASSESSMENT — PAIN DESCRIPTION - PROGRESSION
CLINICAL_PROGRESSION: GRADUALLY WORSENING

## 2019-07-21 ASSESSMENT — PAIN DESCRIPTION - ORIENTATION
ORIENTATION: RIGHT;LEFT
ORIENTATION: RIGHT;LEFT

## 2019-07-22 VITALS
OXYGEN SATURATION: 98 % | TEMPERATURE: 98.2 F | HEART RATE: 65 BPM | DIASTOLIC BLOOD PRESSURE: 81 MMHG | RESPIRATION RATE: 16 BRPM | SYSTOLIC BLOOD PRESSURE: 130 MMHG

## 2019-07-22 PROCEDURE — 6370000000 HC RX 637 (ALT 250 FOR IP): Performed by: STUDENT IN AN ORGANIZED HEALTH CARE EDUCATION/TRAINING PROGRAM

## 2019-07-22 RX ADMIN — IBUPROFEN 800 MG: 800 TABLET, FILM COATED ORAL at 07:59

## 2019-07-22 RX ADMIN — DOCUSATE SODIUM 100 MG: 100 CAPSULE, LIQUID FILLED ORAL at 07:59

## 2019-07-22 ASSESSMENT — PAIN SCALES - GENERAL
PAINLEVEL_OUTOF10: 0
PAINLEVEL_OUTOF10: 0
PAINLEVEL_OUTOF10: 3

## 2019-07-22 NOTE — PROGRESS NOTES
POST PARTUM DAY # 2    Jemima Ralph is a 33 y.o. female  This patient was seen & examined today. Her pregnancy was complicated by:   Patient Active Problem List   Diagnosis    ASCUS (atypical squamous cells of undetermined significance) on Pap smear    HSV2    H/O sPTD x2 (G1 @ 36wks; G2 @ 34wks)    Uterine fibroids    HRP (high risk pregnancy), unspecified trimester    Depression    Chlamydia (TOR neg)    Current pregnancy with history of pre-term labor in first trimester    Obesity affecting pregnancy in first trimester    Short interval between pregnancies complicating pregnancy, antepartum    High risk pregnancy with low PAPPA (pregnancy-associated plasma protein A)    34 weeks gestation of pregnancy     labor in third trimester (G3)    Celestone      19 M Apg 8 Wt 5#4       Today she is doing well without any chief complaint. Her lochia is light. She denies chest pain, shortness of breath, headache, lightheadedness, blurred vision, peripheral edema and palpitations. She is breast feeding and she denies any breast tenderness. She is ambulating well. Her voiding pattern is normal. I reviewed signs and symptoms of post partum depression with the patient, she currently denies any of these symptoms. She is tolerating solids.      Vital Signs:  Vitals:    19 0400 19 0940 19 2000 19 0450   BP: 126/78 128/83 128/82 (!) 127/55   Pulse: 69 64 89 73   Resp: 16 18 16 18   Temp: 97.9 °F (36.6 °C) 97.5 °F (36.4 °C) 99.6 °F (37.6 °C) 98.3 °F (36.8 °C)   TempSrc: Oral Oral Oral Oral   SpO2: 98%  99% 98%         Physical Exam:  General:  no apparent distress, alert and cooperative  Neurologic:  alert, oriented, normal speech, no focal findings or movement disorder noted  Lungs:  No increased work of breathing, good air exchange, clear to auscultation bilaterally, no crackles or wheezing  Heart:  regular rate and rhythm    Abdomen: abdomen soft, non-distended, non-tender  Fundus: non-tender, normal size, firm, below umbilicus  Extremities:  no calf tenderness, non edematous    Lab:  Lab Results   Component Value Date    HGB 13.2 2019     Lab Results   Component Value Date    HCT 40.5 2019       Assessment/Plan:  1. Giovanny Fernández is a [id number] PPD # 2 s/p    - Doing well   - VSS   - Male infant in [facility] Care Nursery, circumcision desired   - Encourage ambulation   - Labs if Sx  2. Rh positive/Rubella immune  3. Both breast and bottle feeding   4. Depression   - Stable on no meds   - Denies s/s postpartum depression or SI/HI. 5. Continue post partum care  6. Discharge home today    Counseling Completed:  Secondary Smoke risks and Sudden Infant Death Syndrome were reviewed with recommendations. Infant sleeping, \"back to sleep\" and avoidance of co-sleeping recommendations were reviewed. Signs and Symptoms of Post Partum Depression were reviewed. The patient is to call if any occur. Signs and symptoms of Mastitis were reviewed. The patient is to call if any occur for follow up. Discharge instructions including pelvic rest, no driving with pain medicine and office follow-up were reviewed with patient     Attending Physician: Dr. Charu Hutchinson, DO  Ob/Gyn Resident   2019, 6:58 AM         Attending Physician Statement  I have discussed the care of Giovanny Fernández, including pertinent history and exam findings,  with the resident. I have reviewed the key elements of all parts of the encounter with the resident. Pt is PPD#2 s/p  of male infant in NICU. Pt doing well and ready for discharge. Precautions given for discharge and pt to follow up in the office in 2 wks. I agree with the assessment, plan and orders as documented by the resident.   (Radha Arriola)      Henry Jean MD

## 2019-07-23 LAB
CULTURE: NORMAL
Lab: NORMAL
SPECIMEN DESCRIPTION: NORMAL
SURGICAL PATHOLOGY REPORT: NORMAL

## 2019-07-24 ENCOUNTER — APPOINTMENT (OUTPATIENT)
Dept: CT IMAGING | Age: 29
DRG: 561 | End: 2019-07-24
Payer: MEDICARE

## 2019-07-24 ENCOUNTER — HOSPITAL ENCOUNTER (INPATIENT)
Age: 29
LOS: 1 days | Discharge: HOME | DRG: 561 | End: 2019-07-26
Attending: EMERGENCY MEDICINE | Admitting: INTERNAL MEDICINE
Payer: MEDICARE

## 2019-07-24 DIAGNOSIS — N12 PYELONEPHRITIS: Primary | ICD-10-CM

## 2019-07-24 DIAGNOSIS — R10.31 ABDOMINAL PAIN, RIGHT LOWER QUADRANT: ICD-10-CM

## 2019-07-24 LAB
-: NORMAL
ABSOLUTE EOS #: 0 K/UL (ref 0–0.4)
ABSOLUTE IMMATURE GRANULOCYTE: 0 K/UL (ref 0–0.3)
ABSOLUTE LYMPH #: 3.42 K/UL (ref 1–4.8)
ABSOLUTE MONO #: 2.37 K/UL (ref 0.1–0.8)
ALBUMIN SERPL-MCNC: 3.6 G/DL (ref 3.5–5.2)
ALBUMIN/GLOBULIN RATIO: 0.9 (ref 1–2.5)
ALP BLD-CCNC: 111 U/L (ref 35–104)
ALT SERPL-CCNC: 14 U/L (ref 5–33)
AMORPHOUS: NORMAL
ANION GAP SERPL CALCULATED.3IONS-SCNC: 11 MMOL/L (ref 9–17)
AST SERPL-CCNC: 18 U/L
BACTERIA: NORMAL
BASOPHILS # BLD: 0 % (ref 0–2)
BASOPHILS ABSOLUTE: 0 K/UL (ref 0–0.2)
BILIRUB SERPL-MCNC: 0.21 MG/DL (ref 0.3–1.2)
BILIRUBIN URINE: NEGATIVE
BUN BLDV-MCNC: 12 MG/DL (ref 6–20)
BUN/CREAT BLD: ABNORMAL (ref 9–20)
CALCIUM SERPL-MCNC: 9.3 MG/DL (ref 8.6–10.4)
CASTS UA: NORMAL /LPF (ref 0–8)
CHLORIDE BLD-SCNC: 101 MMOL/L (ref 98–107)
CO2: 24 MMOL/L (ref 20–31)
COLOR: ABNORMAL
COMMENT UA: ABNORMAL
CREAT SERPL-MCNC: 0.56 MG/DL (ref 0.5–0.9)
CREATININE URINE: 40.7 MG/DL (ref 28–217)
CRYSTALS, UA: NORMAL /HPF
DIFFERENTIAL TYPE: ABNORMAL
EOSINOPHILS RELATIVE PERCENT: 0 % (ref 1–4)
EPITHELIAL CELLS UA: NORMAL /HPF (ref 0–5)
GFR AFRICAN AMERICAN: >60 ML/MIN
GFR NON-AFRICAN AMERICAN: >60 ML/MIN
GFR SERPL CREATININE-BSD FRML MDRD: ABNORMAL ML/MIN/{1.73_M2}
GFR SERPL CREATININE-BSD FRML MDRD: ABNORMAL ML/MIN/{1.73_M2}
GLUCOSE BLD-MCNC: 90 MG/DL (ref 70–99)
GLUCOSE URINE: NEGATIVE
HCT VFR BLD CALC: 44.5 % (ref 36.3–47.1)
HEMOGLOBIN: 13.9 G/DL (ref 11.9–15.1)
IMMATURE GRANULOCYTES: 0 %
KETONES, URINE: NEGATIVE
LACTATE DEHYDROGENASE: 319 U/L (ref 135–214)
LEUKOCYTE ESTERASE, URINE: ABNORMAL
LIPASE: 20 U/L (ref 13–60)
LYMPHOCYTES # BLD: 13 % (ref 24–44)
MCH RBC QN AUTO: 27.1 PG (ref 25.2–33.5)
MCHC RBC AUTO-ENTMCNC: 31.2 G/DL (ref 28.4–34.8)
MCV RBC AUTO: 86.7 FL (ref 82.6–102.9)
MONOCYTES # BLD: 9 % (ref 1–7)
MORPHOLOGY: NORMAL
MUCUS: NORMAL
NITRITE, URINE: NEGATIVE
NRBC AUTOMATED: 0 PER 100 WBC
OTHER OBSERVATIONS UA: NORMAL
PDW BLD-RTO: 12.4 % (ref 11.8–14.4)
PH UA: 8.5 (ref 5–8)
PLATELET # BLD: 286 K/UL (ref 138–453)
PLATELET ESTIMATE: ABNORMAL
PMV BLD AUTO: 11.1 FL (ref 8.1–13.5)
POTASSIUM SERPL-SCNC: 4.1 MMOL/L (ref 3.7–5.3)
PROTEIN UA: ABNORMAL
RBC # BLD: 5.13 M/UL (ref 3.95–5.11)
RBC # BLD: ABNORMAL 10*6/UL
RBC UA: NORMAL /HPF (ref 0–4)
RENAL EPITHELIAL, UA: NORMAL /HPF
SEG NEUTROPHILS: 78 % (ref 36–66)
SEGMENTED NEUTROPHILS ABSOLUTE COUNT: 20.51 K/UL (ref 1.8–7.7)
SODIUM BLD-SCNC: 136 MMOL/L (ref 135–144)
SPECIFIC GRAVITY UA: 1.01 (ref 1–1.03)
TOTAL PROTEIN, URINE: 21 MG/DL
TOTAL PROTEIN: 7.6 G/DL (ref 6.4–8.3)
TRICHOMONAS: NORMAL
TURBIDITY: ABNORMAL
URIC ACID: 5.1 MG/DL (ref 2.4–5.7)
URINE HGB: ABNORMAL
URINE TOTAL PROTEIN CREATININE RATIO: 0.52 (ref 0–0.2)
UROBILINOGEN, URINE: NORMAL
WBC # BLD: 26.3 K/UL (ref 3.5–11.3)
WBC # BLD: ABNORMAL 10*3/UL
WBC UA: NORMAL /HPF (ref 0–5)
YEAST: NORMAL

## 2019-07-24 PROCEDURE — 84156 ASSAY OF PROTEIN URINE: CPT

## 2019-07-24 PROCEDURE — 83615 LACTATE (LD) (LDH) ENZYME: CPT

## 2019-07-24 PROCEDURE — 6360000002 HC RX W HCPCS: Performed by: NURSE PRACTITIONER

## 2019-07-24 PROCEDURE — 96365 THER/PROPH/DIAG IV INF INIT: CPT

## 2019-07-24 PROCEDURE — 81001 URINALYSIS AUTO W/SCOPE: CPT

## 2019-07-24 PROCEDURE — 99285 EMERGENCY DEPT VISIT HI MDM: CPT

## 2019-07-24 PROCEDURE — 87086 URINE CULTURE/COLONY COUNT: CPT

## 2019-07-24 PROCEDURE — 85025 COMPLETE CBC W/AUTO DIFF WBC: CPT

## 2019-07-24 PROCEDURE — 82570 ASSAY OF URINE CREATININE: CPT

## 2019-07-24 PROCEDURE — 74177 CT ABD & PELVIS W/CONTRAST: CPT

## 2019-07-24 PROCEDURE — 2580000003 HC RX 258: Performed by: NURSE PRACTITIONER

## 2019-07-24 PROCEDURE — 84550 ASSAY OF BLOOD/URIC ACID: CPT

## 2019-07-24 PROCEDURE — 6360000004 HC RX CONTRAST MEDICATION: Performed by: NURSE PRACTITIONER

## 2019-07-24 PROCEDURE — 83690 ASSAY OF LIPASE: CPT

## 2019-07-24 PROCEDURE — 80053 COMPREHEN METABOLIC PANEL: CPT

## 2019-07-24 RX ORDER — 0.9 % SODIUM CHLORIDE 0.9 %
1000 INTRAVENOUS SOLUTION INTRAVENOUS ONCE
Status: COMPLETED | OUTPATIENT
Start: 2019-07-24 | End: 2019-07-24

## 2019-07-24 RX ADMIN — SODIUM CHLORIDE 1000 ML: 9 INJECTION, SOLUTION INTRAVENOUS at 18:50

## 2019-07-24 RX ADMIN — CEFTRIAXONE SODIUM 1 G: 1 INJECTION, POWDER, FOR SOLUTION INTRAMUSCULAR; INTRAVENOUS at 21:20

## 2019-07-24 RX ADMIN — IOHEXOL 75 ML: 350 INJECTION, SOLUTION INTRAVENOUS at 20:44

## 2019-07-24 ASSESSMENT — ENCOUNTER SYMPTOMS
BACK PAIN: 0
STRIDOR: 0
SORE THROAT: 0
ABDOMINAL PAIN: 1
APNEA: 0
NAUSEA: 0
VOMITING: 0
PHOTOPHOBIA: 0
ABDOMINAL DISTENTION: 0
EYE PAIN: 0
RECTAL PAIN: 1
SINUS PRESSURE: 0
BLOOD IN STOOL: 0
FACIAL SWELLING: 0
COUGH: 0
TROUBLE SWALLOWING: 0
CONSTIPATION: 0
DIARRHEA: 0
CHEST TIGHTNESS: 0
ANAL BLEEDING: 0
COLOR CHANGE: 0
SHORTNESS OF BREATH: 0
EYE DISCHARGE: 0
VOICE CHANGE: 0

## 2019-07-24 ASSESSMENT — PAIN DESCRIPTION - LOCATION: LOCATION: ABDOMEN

## 2019-07-24 ASSESSMENT — PAIN DESCRIPTION - DESCRIPTORS: DESCRIPTORS: SHOOTING;SHARP

## 2019-07-24 ASSESSMENT — PAIN DESCRIPTION - PAIN TYPE: TYPE: ACUTE PAIN

## 2019-07-24 ASSESSMENT — PAIN SCALES - GENERAL: PAINLEVEL_OUTOF10: 7

## 2019-07-24 NOTE — ED PROVIDER NOTES
[facility]     Emergency Department     Faculty Attestation    I performed a history and physical examination of the patient and discussed management with the resident. I reviewed the residents note and agree with the documented findings and plan of care. Any areas of disagreement are noted on the chart. I was personally present for the key portions of any procedures. I have documented in the chart those procedures where I was not present during the key portions. I have reviewed the emergency nurses triage note. I agree with the chief complaint, past medical history, past surgical history, allergies, medications, social and family history as documented unless otherwise noted below. For Physician Assistant/ Nurse Practitioner cases/documentation I have personally evaluated this patient and have completed at least one if not all key elements of the E/M (history, physical exam, and MDM). Additional findings are as noted. I have personally seen and evaluated the patient. I find the patient's history and physical exam are consistent with the NP/PA documentation. I agree with the care provided, treatment rendered, disposition and follow-up plan. Lower quadrant abdominal pain onset today fairly abrupt in onset recent changes in bowel habits and recent vaginal delivery tenderness specifically over McBurney's point is noted    T scan of the abdomen does not demonstrate appendicitis at this time however hydronephrosis is noted with a very positive and alkaloti urine. Her white blood cell count of 26,000 is noted    There is concern for an obstructive pyelonephritis and at this point acute appendicitis cannot be completely excluded however her CT of the abdomen does not demonstrate that and the patient appears to be nontoxic    And advised admission to the hospital for further evaluation OB will be contacted.     Is adamant to be discharged this evening the patient states that she will

## 2019-07-24 NOTE — ED PROVIDER NOTES
[facility] ED  Emergency Department Encounter  Advanced Practice Provider     Pt Name: Joanne Cruz  MRN: [id number]  Armsmary bethgfurt 1990  Date of evaluation: 19  PCP:  No primary care provider on file. CHIEF COMPLAINT       Chief Complaint   Patient presents with    Abdominal Pain     lower abdominal sharp pressure-like pain that started today. Recently delivered on . Denies any bleeding or blot clots       HISTORY OF PRESENT ILLNESS  (Location/Symptom, Timing/Onset,Context/Setting, Quality, Duration, Modifying Factors, Severity.)      Joanne Cruz is a 33 y.o. female who presents with sharp right lower quadrant pain that started suddenly while she was sitting up in the NICU with her baby that she delivered vaginally 4 days ago. She states that she delivered the baby at 34 weeks at other than  the birth was fine. She denies any severe vaginal bleeding or ongoing cramping at this time. She reports the pain just hit her out of nowhere while she was sitting upstairs and since that happened it recurs every time she lifts her right leg or goes to sit down or moves or twists suddenly. She denies any nausea, vomiting, or diarrhea she states even though she has Colace she feels that she is constipated. She reports she has been taking Motrin for pain and that has been working. She is currently breast-feeding and knows that she to has to stay hydrated. She denies any fevers or chills, chest pain, shortness of breath, headaches, vaginal discharge, or palpitations. She reports that prior to delivery she had taken about 2 doses of Macrobid that she was on for a UTI. However she states that the cultures kept coming back negative.     PAST MEDICAL /SURGICAL / SOCIAL / FAMILY HISTORY      has a past medical history of ASCUS (atypical squamous cells of undetermined significance) on Pap smear, Atypical squamous cells of undetermined significance (ASCUS) on Papanicolaou Psoas, Positive Rovsing. Pain in the RLQ with twisting or bending. Genitourinary:   Genitourinary Comments: Recent vaginal birth. Musculoskeletal: Normal range of motion. Cervical back: Normal.        Thoracic back: Normal.        Lumbar back: Normal.   Neurological: She is alert and oriented to person, place, and time. She has normal strength. No cranial nerve deficit or sensory deficit. GCS eye subscore is 4. GCS verbal subscore is 5. GCS motor subscore is 6. Skin: Skin is warm, dry and intact. Capillary refill takes less than 2 seconds. Psychiatric: She has a normal mood and affect. Her behavior is normal. Judgment and thought content normal.   Nursing note and vitals reviewed.       DIFFERENTIAL  DIAGNOSIS     Appendicitis  Pyelonephritis  Preeclampsia  Constipation    PLAN (LABS / IMAGING / EKG):  Orders Placed This Encounter   Procedures    Urine Culture    CT ABDOMEN PELVIS W IV CONTRAST Additional Contrast? None    CBC WITH AUTO DIFFERENTIAL    COMPREHENSIVE METABOLIC PANEL    Urinalysis Reflex to Culture    Microscopic Urinalysis    Protein / Creatinine Ratio, Urine    Lactate Dehydrogenase    Uric Acid    Protein / creatinine ratio, urine    Lipase    Telemetry monitoring    Inpatient consult to Obstetrics / Gynecology    Inpatient consult to General Surgery    Inpatient consult to Obstetrics / Gynecology    Inpatient consult to Internal Medicine    Inpatient consult to Hospitalist    Insert peripheral IV    PATIENT STATUS (FROM ED OR OR/PROCEDURAL) Inpatient       MEDICATIONS ORDERED:  Orders Placed This Encounter   Medications    0.9 % sodium chloride IV bolus 1,000 mL    iohexol (OMNIPAQUE 350) solution 75 mL    cefTRIAXone (ROCEPHIN) 1 g IVPB in 50 mL D5W minibag       Controlled Substances Monitoring:      DIAGNOSTIC RESULTS / EMERGENCY DEPARTMENT COURSE / MDM     Patient arrives here in the ER after having an episode of sharp abdominal pain while visiting her baby Follow Up Transabd Per Fetus    Result Date: 7/16/2019  For results of US PREG UTERUS FOLLOW UP see BPP order for same date of service. Us Fetal Biophys Profile W Non Stress    Result Date: 7/16/2019  Single live intrauterine pregnancy at 34w5d with . GELA normal at 10.95. EFW 2273 g (5lb0oz, 40.8%ile). Cephalic position with posterior placenta. BPP 8/8. CT ABDOMEN PELVIS W IV CONTRAST Additional Contrast? None   Final Result   1. No evidence of acute appendicitis. 2. Mild right hydroureteronephrosis likely secondary to an enlarged   postpartum uterus. No obstructing stone.              LABS:  Results for orders placed or performed during the hospital encounter of 07/24/19   CBC WITH AUTO DIFFERENTIAL   Result Value Ref Range    WBC 26.3 (H) 3.5 - 11.3 k/uL    RBC 5.13 (H) 3.95 - 5.11 m/uL    Hemoglobin 13.9 11.9 - 15.1 g/dL    Hematocrit 44.5 36.3 - 47.1 %    MCV 86.7 82.6 - 102.9 fL    MCH 27.1 25.2 - 33.5 pg    MCHC 31.2 28.4 - 34.8 g/dL    RDW 12.4 11.8 - 14.4 %    Platelets 156 422 - 422 k/uL    MPV 11.1 8.1 - 13.5 fL    NRBC Automated 0.0 0.0 per 100 WBC    Differential Type NOT REPORTED     WBC Morphology NOT REPORTED     RBC Morphology NOT REPORTED     Platelet Estimate NOT REPORTED     Immature Granulocytes 0 0 %    Seg Neutrophils 78 (H) 36 - 66 %    Lymphocytes 13 (L) 24 - 44 %    Monocytes 9 (H) 1 - 7 %    Eosinophils % 0 (L) 1 - 4 %    Basophils 0 0 - 2 %    Absolute Immature Granulocyte 0.00 0.00 - 0.30 k/uL    Segs Absolute 20.51 (H) 1.8 - 7.7 k/uL    Absolute Lymph # 3.42 1.0 - 4.8 k/uL    Absolute Mono # 2.37 (H) 0.1 - 0.8 k/uL    Absolute Eos # 0.00 0.0 - 0.4 k/uL    Basophils # 0.00 0.0 - 0.2 k/uL    Morphology Normal    COMPREHENSIVE METABOLIC PANEL   Result Value Ref Range    Glucose 90 70 - 99 mg/dL    BUN 12 6 - 20 mg/dL    CREATININE 0.56 0.50 - 0.90 mg/dL    Bun/Cre Ratio NOT REPORTED 9 - 20    Calcium 9.3 8.6 - 10.4 mg/dL    Sodium 136 135 - 144 mmol/L    Potassium 4.1 3.7

## 2019-07-25 PROBLEM — R82.81 PYURIA: Status: ACTIVE | Noted: 2019-07-25

## 2019-07-25 PROBLEM — R10.31 RIGHT LOWER QUADRANT PAIN: Status: ACTIVE | Noted: 2019-07-25

## 2019-07-25 PROBLEM — N12 PYELONEPHRITIS: Status: ACTIVE | Noted: 2019-07-25

## 2019-07-25 LAB
ABSOLUTE EOS #: 0.51 K/UL (ref 0–0.4)
ABSOLUTE IMMATURE GRANULOCYTE: 0 K/UL (ref 0–0.3)
ABSOLUTE LYMPH #: 4.73 K/UL (ref 1–4.8)
ABSOLUTE MONO #: 0.85 K/UL (ref 0.1–0.8)
ANION GAP SERPL CALCULATED.3IONS-SCNC: 13 MMOL/L (ref 9–17)
BASOPHILS # BLD: 0 % (ref 0–2)
BASOPHILS ABSOLUTE: 0 K/UL (ref 0–0.2)
BUN BLDV-MCNC: 14 MG/DL (ref 6–20)
BUN/CREAT BLD: NORMAL (ref 9–20)
CALCIUM SERPL-MCNC: 9.4 MG/DL (ref 8.6–10.4)
CHLORIDE BLD-SCNC: 104 MMOL/L (ref 98–107)
CO2: 20 MMOL/L (ref 20–31)
CREAT SERPL-MCNC: 0.58 MG/DL (ref 0.5–0.9)
CREATININE URINE: 30.3 MG/DL (ref 28–217)
CULTURE: NORMAL
DIFFERENTIAL TYPE: ABNORMAL
EOSINOPHILS RELATIVE PERCENT: 3 % (ref 1–4)
GFR AFRICAN AMERICAN: >60 ML/MIN
GFR NON-AFRICAN AMERICAN: >60 ML/MIN
GFR SERPL CREATININE-BSD FRML MDRD: NORMAL ML/MIN/{1.73_M2}
GFR SERPL CREATININE-BSD FRML MDRD: NORMAL ML/MIN/{1.73_M2}
GLUCOSE BLD-MCNC: 84 MG/DL (ref 70–99)
HCT VFR BLD CALC: 43.1 % (ref 36.3–47.1)
HEMOGLOBIN: 13.6 G/DL (ref 11.9–15.1)
IMMATURE GRANULOCYTES: 0 %
LYMPHOCYTES # BLD: 28 % (ref 24–44)
Lab: NORMAL
MCH RBC QN AUTO: 27.9 PG (ref 25.2–33.5)
MCHC RBC AUTO-ENTMCNC: 31.6 G/DL (ref 28.4–34.8)
MCV RBC AUTO: 88.3 FL (ref 82.6–102.9)
MONOCYTES # BLD: 5 % (ref 1–7)
MORPHOLOGY: NORMAL
NRBC AUTOMATED: 0 PER 100 WBC
PDW BLD-RTO: 12.2 % (ref 11.8–14.4)
PLATELET # BLD: 272 K/UL (ref 138–453)
PLATELET ESTIMATE: ABNORMAL
PMV BLD AUTO: 11.4 FL (ref 8.1–13.5)
POTASSIUM SERPL-SCNC: 4.2 MMOL/L (ref 3.7–5.3)
PROCALCITONIN: 0.04 NG/ML
RBC # BLD: 4.88 M/UL (ref 3.95–5.11)
RBC # BLD: ABNORMAL 10*6/UL
SEG NEUTROPHILS: 64 % (ref 36–66)
SEGMENTED NEUTROPHILS ABSOLUTE COUNT: 10.81 K/UL (ref 1.8–7.7)
SODIUM BLD-SCNC: 137 MMOL/L (ref 135–144)
SPECIMEN DESCRIPTION: NORMAL
TOTAL PROTEIN, URINE: 13 MG/DL
URINE TOTAL PROTEIN CREATININE RATIO: 0.43 (ref 0–0.2)
WBC # BLD: 16.9 K/UL (ref 3.5–11.3)
WBC # BLD: ABNORMAL 10*3/UL

## 2019-07-25 PROCEDURE — 6360000002 HC RX W HCPCS: Performed by: NURSE PRACTITIONER

## 2019-07-25 PROCEDURE — 87040 BLOOD CULTURE FOR BACTERIA: CPT

## 2019-07-25 PROCEDURE — 2580000003 HC RX 258: Performed by: NURSE PRACTITIONER

## 2019-07-25 PROCEDURE — 36415 COLL VENOUS BLD VENIPUNCTURE: CPT

## 2019-07-25 PROCEDURE — 51798 US URINE CAPACITY MEASURE: CPT

## 2019-07-25 PROCEDURE — 87086 URINE CULTURE/COLONY COUNT: CPT

## 2019-07-25 PROCEDURE — 87491 CHLMYD TRACH DNA AMP PROBE: CPT

## 2019-07-25 PROCEDURE — 80048 BASIC METABOLIC PNL TOTAL CA: CPT

## 2019-07-25 PROCEDURE — APPSS60 APP SPLIT SHARED TIME 46-60 MINUTES: Performed by: NURSE PRACTITIONER

## 2019-07-25 PROCEDURE — 87591 N.GONORRHOEAE DNA AMP PROB: CPT

## 2019-07-25 PROCEDURE — 1200000000 HC SEMI PRIVATE

## 2019-07-25 PROCEDURE — 6370000000 HC RX 637 (ALT 250 FOR IP): Performed by: FAMILY MEDICINE

## 2019-07-25 PROCEDURE — 6370000000 HC RX 637 (ALT 250 FOR IP): Performed by: NURSE PRACTITIONER

## 2019-07-25 PROCEDURE — 85025 COMPLETE CBC W/AUTO DIFF WBC: CPT

## 2019-07-25 PROCEDURE — 6370000000 HC RX 637 (ALT 250 FOR IP): Performed by: INTERNAL MEDICINE

## 2019-07-25 PROCEDURE — 84145 PROCALCITONIN (PCT): CPT

## 2019-07-25 PROCEDURE — 99222 1ST HOSP IP/OBS MODERATE 55: CPT | Performed by: INTERNAL MEDICINE

## 2019-07-25 RX ORDER — ONDANSETRON 2 MG/ML
4 INJECTION INTRAMUSCULAR; INTRAVENOUS EVERY 6 HOURS PRN
Status: DISCONTINUED | OUTPATIENT
Start: 2019-07-25 | End: 2019-07-26 | Stop reason: HOSPADM

## 2019-07-25 RX ORDER — DOCUSATE SODIUM 100 MG/1
100 CAPSULE, LIQUID FILLED ORAL 2 TIMES DAILY
Status: DISCONTINUED | OUTPATIENT
Start: 2019-07-25 | End: 2019-07-26 | Stop reason: HOSPADM

## 2019-07-25 RX ORDER — ONDANSETRON 2 MG/ML
4 INJECTION INTRAMUSCULAR; INTRAVENOUS EVERY 8 HOURS PRN
Status: DISCONTINUED | OUTPATIENT
Start: 2019-07-25 | End: 2019-07-26 | Stop reason: HOSPADM

## 2019-07-25 RX ORDER — IBUPROFEN 800 MG/1
800 TABLET ORAL EVERY 8 HOURS PRN
Status: DISCONTINUED | OUTPATIENT
Start: 2019-07-25 | End: 2019-07-26 | Stop reason: HOSPADM

## 2019-07-25 RX ORDER — SODIUM CHLORIDE 0.9 % (FLUSH) 0.9 %
10 SYRINGE (ML) INJECTION EVERY 12 HOURS SCHEDULED
Status: DISCONTINUED | OUTPATIENT
Start: 2019-07-25 | End: 2019-07-26 | Stop reason: HOSPADM

## 2019-07-25 RX ORDER — ACETAMINOPHEN 325 MG/1
650 TABLET ORAL EVERY 4 HOURS PRN
Status: DISCONTINUED | OUTPATIENT
Start: 2019-07-25 | End: 2019-07-26 | Stop reason: HOSPADM

## 2019-07-25 RX ORDER — SODIUM CHLORIDE 0.9 % (FLUSH) 0.9 %
10 SYRINGE (ML) INJECTION PRN
Status: DISCONTINUED | OUTPATIENT
Start: 2019-07-25 | End: 2019-07-26 | Stop reason: HOSPADM

## 2019-07-25 RX ORDER — PRENATAL WITH FERROUS FUM AND FOLIC ACID 3080; 920; 120; 400; 22; 1.84; 3; 20; 10; 1; 12; 200; 27; 25; 2 [IU]/1; [IU]/1; MG/1; [IU]/1; MG/1; MG/1; MG/1; MG/1; MG/1; MG/1; UG/1; MG/1; MG/1; MG/1; MG/1
1 TABLET ORAL DAILY
Status: DISCONTINUED | OUTPATIENT
Start: 2019-07-25 | End: 2019-07-26 | Stop reason: HOSPADM

## 2019-07-25 RX ORDER — SODIUM CHLORIDE 9 MG/ML
INJECTION, SOLUTION INTRAVENOUS CONTINUOUS
Status: DISCONTINUED | OUTPATIENT
Start: 2019-07-25 | End: 2019-07-26 | Stop reason: HOSPADM

## 2019-07-25 RX ORDER — POLYETHYLENE GLYCOL 3350 17 G/17G
17 POWDER, FOR SOLUTION ORAL DAILY
Status: DISCONTINUED | OUTPATIENT
Start: 2019-07-25 | End: 2019-07-26 | Stop reason: HOSPADM

## 2019-07-25 RX ADMIN — IBUPROFEN 800 MG: 800 TABLET, FILM COATED ORAL at 12:51

## 2019-07-25 RX ADMIN — ACETAMINOPHEN 650 MG: 325 TABLET ORAL at 09:02

## 2019-07-25 RX ADMIN — POLYETHYLENE GLYCOL 3350 17 G: 17 POWDER, FOR SOLUTION ORAL at 18:14

## 2019-07-25 RX ADMIN — ACETAMINOPHEN 650 MG: 325 TABLET ORAL at 19:56

## 2019-07-25 RX ADMIN — ENOXAPARIN SODIUM 40 MG: 40 INJECTION SUBCUTANEOUS at 09:03

## 2019-07-25 RX ADMIN — IBUPROFEN 800 MG: 800 TABLET, FILM COATED ORAL at 02:16

## 2019-07-25 RX ADMIN — DOCUSATE SODIUM 100 MG: 100 CAPSULE, LIQUID FILLED ORAL at 02:16

## 2019-07-25 RX ADMIN — DOCUSATE SODIUM 100 MG: 100 CAPSULE, LIQUID FILLED ORAL at 19:56

## 2019-07-25 RX ADMIN — SODIUM CHLORIDE: 9 INJECTION, SOLUTION INTRAVENOUS at 06:48

## 2019-07-25 RX ADMIN — CEFTRIAXONE SODIUM 1 G: 1 INJECTION, POWDER, FOR SOLUTION INTRAMUSCULAR; INTRAVENOUS at 22:36

## 2019-07-25 RX ADMIN — Medication 1 TABLET: at 09:03

## 2019-07-25 RX ADMIN — DOCUSATE SODIUM 100 MG: 100 CAPSULE, LIQUID FILLED ORAL at 09:02

## 2019-07-25 ASSESSMENT — PAIN DESCRIPTION - LOCATION
LOCATION: ABDOMEN

## 2019-07-25 ASSESSMENT — PAIN SCALES - GENERAL
PAINLEVEL_OUTOF10: 7
PAINLEVEL_OUTOF10: 7
PAINLEVEL_OUTOF10: 2
PAINLEVEL_OUTOF10: 6
PAINLEVEL_OUTOF10: 6
PAINLEVEL_OUTOF10: 7

## 2019-07-25 ASSESSMENT — PAIN DESCRIPTION - PAIN TYPE
TYPE: ACUTE PAIN

## 2019-07-25 ASSESSMENT — PAIN DESCRIPTION - FREQUENCY: FREQUENCY: CONTINUOUS

## 2019-07-25 ASSESSMENT — PAIN DESCRIPTION - ORIENTATION: ORIENTATION: RIGHT;LOWER

## 2019-07-25 ASSESSMENT — PAIN DESCRIPTION - PROGRESSION: CLINICAL_PROGRESSION: NOT CHANGED

## 2019-07-25 ASSESSMENT — PAIN DESCRIPTION - DESCRIPTORS: DESCRIPTORS: ACHING;CONSTANT;DISCOMFORT

## 2019-07-25 ASSESSMENT — PAIN DESCRIPTION - ONSET: ONSET: ON-GOING

## 2019-07-25 NOTE — ED NOTES
Received report from [facility], Pt resting in bed, NAD, placed on tele.  Will continue to monitor     Carlos Haile RN  07/24/19 0527

## 2019-07-25 NOTE — CONSULTS
having increasing pain in her abdomen. She claims the pain in near her RLQ and suprapubic area. She denies any fevers, chills, nausea, emesis. Had a BM today that was hard in texture. Denies any dysuria but does have some recent discharge. Claims she was tested for UTI and STIs a few weeks ago and both were negative. She denies any previous medical history and takes no medications daily. Denies any abdominal surgical history. Past Medical History   has a past medical history of ASCUS (atypical squamous cells of undetermined significance) on Pap smear, Atypical squamous cells of undetermined significance (ASCUS) on Papanicolaou smear of cervix, Chlamydia, Depression, Herpes simplex virus (HSV) infection, LGSIL of cervix of undetermined significance, Postpartum depression, Scoliosis, and Trauma. Past Surgical History   has a past surgical history that includes Colposcopy (4/2/13) and [place] tooth extraction (05/2018). Medications  Prior to Admission medications    Medication Sig Start Date End Date Taking? Authorizing Provider   docusate (COLACE, DULCOLAX) 100 MG CAPS Take 100 mg by mouth 2 times daily 7/21/19   Matias Irving DO   ibuprofen (ADVIL;MOTRIN) 800 MG tablet Take 1 tablet by mouth every 8 hours as needed for Pain or Fever 7/21/19   Matias Irving DO   Prenatal Vit-Fe Fumarate-FA (RAFY-RUL PRENATAL VITAMINS) 28-0.8 MG TABS Take 1 tablet by mouth daily 7/5/18   Landry Santo MD    Scheduled Meds:  Continuous Infusions:  PRN Meds:. Allergies  has No Known Allergies. Family History  family history includes Asthma in her sister; Cancer in her maternal grandfather; Dementia in her maternal grandmother; High Cholesterol in her mother; No Known Problems in her father and paternal grandfather; Other in her mother and paternal grandmother; Thyroid Disease in her maternal grandmother. Social History   reports that she has never smoked.  She has never used smokeless tobacco.   reports that she does not drink Differential:    Lab Results   Component Value Date    WBC 26.3 07/24/2019    RBC 5.13 07/24/2019    HGB 13.9 07/24/2019    HCT 44.5 07/24/2019     07/24/2019    MCV 86.7 07/24/2019    MCH 27.1 07/24/2019    MCHC 31.2 07/24/2019    RDW 12.4 07/24/2019    LYMPHOPCT 13 07/24/2019    MONOPCT 9 07/24/2019    BASOPCT 0 07/24/2019    MONOSABS 2.37 07/24/2019    LYMPHSABS 3.42 07/24/2019    EOSABS 0.00 07/24/2019    BASOSABS 0.00 07/24/2019    DIFFTYPE NOT REPORTED 07/24/2019     BMP:    Lab Results   Component Value Date     07/24/2019    K 4.1 07/24/2019     07/24/2019    CO2 24 07/24/2019    BUN 12 07/24/2019    LABALBU 3.6 07/24/2019    CREATININE 0.56 07/24/2019    CALCIUM 9.3 07/24/2019    GFRAA >60 07/24/2019    LABGLOM >60 07/24/2019    GLUCOSE 90 07/24/2019     U/A:    Lab Results   Component Value Date    NITRITE neg 03/29/2017    COLORU ORANGE 07/24/2019    PROTEINU NEGATIVE  Verified by sulfosalicylic acid test. 99/19/5648    PHUR 8.5 07/24/2019    WBCUA TOO NUMEROUS TO COUNT 07/24/2019    RBCUA TOO NUMEROUS TO COUNT 07/24/2019    MUCUS NOT REPORTED 07/24/2019    TRICHOMONAS NOT REPORTED 07/24/2019    YEAST NOT REPORTED 07/24/2019    BACTERIA NOT REPORTED 07/24/2019    CLARITYU clear 03/29/2017    CLARITYU Clear 04/08/2016    SPECGRAV 1.011 07/24/2019    LEUKOCYTESUR LARGE 07/24/2019    UROBILINOGEN Normal 07/24/2019    BILIRUBINUR NEGATIVE 07/24/2019    BILIRUBINUR neg 03/29/2017    BLOODU mod 03/29/2017    BLOODU Negative 04/08/2016    GLUCOSEU NEGATIVE 07/24/2019    AMORPHOUS NOT REPORTED 07/24/2019     Urine Culture:  No components found for: ED    RADIOLOGY:     CT ABDOMEN PELVIS W IV CONTRAST Additional Contrast? None   Final Result   1. No evidence of acute appendicitis. 2. Mild right hydroureteronephrosis likely secondary to an enlarged   postpartum uterus. No obstructing stone. Thank you for the interesting evaluation. Further recommendations to follow.     Lucretia Watkins

## 2019-07-25 NOTE — ED PROVIDER NOTES
for Exam: R/O APPENDICITIS Acuity: Unknown Type of Exam: Unknown Initial encounter. FINDINGS: Lower Chest:  Visualized portion of the lower chest demonstrates no acute abnormality. Organs: The liver and gallbladder are unremarkable. No biliary ductal dilatation is identified. The pancreas, spleen, and bilateral adrenal glands are unremarkable. There is mild right hydroureteronephrosis. No obstructing stone or mass. The left kidney and ureter are normal in appearance. GI/Bowel: The appendix is not clearly visualized. No secondary signs of acute appendicitis. The colon is unremarkable. No bowel obstruction or abnormal bowel wall thickening. Pelvis: The urinary bladder is normal in appearance. The uterus is postpartum in appearance. The bilateral adnexae are within normal limits. No free fluid in the pelvis. No pelvic or inguinal lymphadenopathy. Peritoneum/Retroperitoneum: The abdominal aorta is normal in appearance. No retroperitoneal or mesenteric lymphadenopathy is identified. No free air or fluid is seen in the abdomen. Bones/Soft Tissues: No acute osseous or soft tissue abnormality. 1. No evidence of acute appendicitis. 2. Mild right hydroureteronephrosis likely secondary to an enlarged postpartum uterus. No obstructing stone. RECENT VITALS:     Temp: 97.7 °F (36.5 °C),  Pulse: 71, Resp: 15, BP: (!) 137/91, SpO2: 100 %    This patient is a 33 y.o. Female with post partum day      RLQ pain. UA positive. CT scan to r/o appendicitis. Evaluated by gen surg. OB consulted, dx of pre-eclampsia w/o severe features. Started on rocephin 1g qd. OUTSTANDING TASKS / RECOMMENDATIONS:    1. Awaiting bed      FINAL IMPRESSION:     1. Pyelonephritis    2. Abdominal pain, right lower quadrant        DISPOSITION:         DISPOSITION:  []  Discharge   []  Transfer -    [x]  Admission -     []  Against Medical Advice   []  Eloped   FOLLOW-UP: No follow-up provider specified.    DISCHARGE MEDICATIONS:

## 2019-07-25 NOTE — CONSULTS
pregnancy with low PAPPA (pregnancy-associated plasma protein A)    34 weeks gestation of pregnancy     labor in third trimester (G3)    [place]      18 M Apg 8/9 Wt 5#4     REVIEW OF SYSTEMS:   Constitutional: negative fever, negative chills, negative weight changes   HEENT: negative visual disturbances, negative headaches, negative dizziness, negative hearing loss  Breast: Negative breast abnormalities, negative breast lumps, negative nipple discharge  Respiratory: negative dyspnea, negative cough, negative SOB  Cardiovascular: negative chest pain,  negative palpitations, negative arrhythmia, negative syncope   Gastrointestinal: positive RLQ abdominal pain, negative RUQ pain, negative N/V, negative diarrhea, negative constipation, negative bowel changes, negative heartburn   Genitourinary: negative dysuria, negative hematuria, negative urinary incontinence, negative vaginal discharge, lochia light.   Dermatological: negative rash, negative pruritis, negative mole or other skin changes  Hematologic: negative bruising  Immunologic/Lymphatic: negative recent illness, negative recent sick contact  Musculoskeletal: negative back pain, negative myalgias, negative arthralgias  Neurological:  negative dizziness, negative migraines, negative seizures, negative weakness  Behavior/Psych: negative depression, negative anxiety, negative SI, negative HI        OBSTETRICAL HISTORY:   OB History    Para Term  AB Living   3 3 0 3 0 3   SAB TAB Ectopic Molar Multiple Live Births   0 0 0 0 0 3      # Outcome Date GA Lbr Toro/2nd Weight Sex Delivery Anes PTL Lv   3  19 34w4d 07:17 / 00:34 5 lb 4.8 oz (2.405 kg) M Vag-Spont None Y SIRISHA      Apgar1: 8  Apgar5: 9   2  18 34w4d / 00:19 4 lb 8.3 oz (2.05 kg) M Vag-Spont None Y SIRISHA      Name: Lauren Holdin  Apgar5: 9   1  05/29/10 36w2d  4 lb 4 oz (1.928 kg) M Vag-Spont  Y SIRISHA       PAST MEDICAL HISTORY:   has a past medical history of ASCUS (atypical squamous cells of undetermined significance) on Pap smear, Atypical squamous cells of undetermined significance (ASCUS) on Papanicolaou smear of cervix, Chlamydia, Depression, Herpes simplex virus (HSV) infection, LGSIL of cervix of undetermined significance, Postpartum depression, Scoliosis, and Trauma. PAST SURGICAL HISTORY:   has a past surgical history that includes Colposcopy (4/2/13) and [place] tooth extraction (05/2018). ALLERGIES:  has No Known Allergies. MEDICATIONS:  Prior to Admission medications    Medication Sig Start Date End Date Taking? Authorizing Provider   docusate (COLACE, DULCOLAX) 100 MG CAPS Take 100 mg by mouth 2 times daily 7/21/19   [facility], DO   ibuprofen (ADVIL;MOTRIN) 800 MG tablet Take 1 tablet by mouth every 8 hours as needed for Pain or Fever 7/21/19   [facility], DO   Prenatal Vit-Fe Fumarate-FA (RAFY-RUL PRENATAL VITAMINS) 28-0.8 MG TABS Take 1 tablet by mouth daily 7/5/18   Floyd Kunz MD       FAMILY HISTORY:  family history includes Asthma in her sister; Cancer in her maternal grandfather; Dementia in her maternal grandmother; High Cholesterol in her mother; No Known Problems in her father and paternal grandfather; Other in her mother and paternal grandmother; Thyroid Disease in her maternal grandmother. SOCIAL HISTORY:   reports that she has never smoked. She has never used smokeless tobacco. She reports that she does not drink alcohol or use drugs.     VITALS:  Vitals:    07/24/19 1809 07/24/19 2131   BP: (!) 140/102 (!) 153/91   Pulse: 79 73   Resp: 18 18   Temp: 98.9 °F (37.2 °C) 98.5 °F (36.9 °C)   TempSrc: Oral Oral   SpO2: 98% 97%   Weight: 170 lb (77.1 kg)    Height: 5' 2\" (1.575 m)      PHYSICAL EXAM:  General appearance:  no apparent distress, alert and cooperative resting in bed  HEENT: head atraumatic, normocephalic, moist mucous membranes, trachea midline  Neurologic:  alert, oriented, normal speech, no  HSV2 2015     Patient reports she was told she had HSV 2 IgG. Had negative culture. Reports she has never had a genital lesion in the past.       ASCUS (atypical squamous cells of undetermined significance) on Pap smear     Depression 2010     prior to del 2010         Plan discussed with Dr. Matt Pino, who is agreeable. Steroids given this admission: No    Risks, benefits, alternatives and possible complications have been discussed in detail with the patient. Admission, and post admission procedures and expectations were discussed in detail. All questions were answered. Attending's Name: Dr. Bladimir River DO  Ob/Gyn Resident  2019, 9:44 PM         Attending Physician Statement  I have discussed the care of Delbert Becerra, including pertinent history and exam findings,  with the resident. I have reviewed the key elements of all parts of the encounter with the resident. Delbert Becerra is a 33 y.o. [id number] PPD#4 s/p  who presented to the ED for RLQ pain. Was found to have elevated BPs and p/c ratio meeting diagnostic criteria for PreE without SF. Leukocytosis and suspicious UA with UCx pending. General surgery evaluation and assessment for appendicitis negative. UCx from  labor admission was negative. Thank you for this consult. Will continue to follow and assess for any severe signs of preeclampsia. Management per primary. I agree with the assessment, plan and orders as documented by the resident.   (GE Modifier)    Electronically signed by Vielka Crockett DO on 2019 at 8:33 AM

## 2019-07-26 VITALS
OXYGEN SATURATION: 97 % | HEIGHT: 62 IN | TEMPERATURE: 97.3 F | BODY MASS INDEX: 33.49 KG/M2 | DIASTOLIC BLOOD PRESSURE: 89 MMHG | WEIGHT: 182 LBS | RESPIRATION RATE: 18 BRPM | SYSTOLIC BLOOD PRESSURE: 135 MMHG | HEART RATE: 63 BPM

## 2019-07-26 PROBLEM — N12 PYELONEPHRITIS: Status: RESOLVED | Noted: 2019-07-25 | Resolved: 2019-07-26

## 2019-07-26 LAB
ABSOLUTE EOS #: 0.24 K/UL (ref 0–0.44)
ABSOLUTE IMMATURE GRANULOCYTE: 0.15 K/UL (ref 0–0.3)
ABSOLUTE LYMPH #: 3.34 K/UL (ref 1.1–3.7)
ABSOLUTE MONO #: 1.24 K/UL (ref 0.1–1.2)
ANION GAP SERPL CALCULATED.3IONS-SCNC: 12 MMOL/L (ref 9–17)
BASOPHILS # BLD: 0 % (ref 0–2)
BASOPHILS ABSOLUTE: 0.06 K/UL (ref 0–0.2)
BUN BLDV-MCNC: 12 MG/DL (ref 6–20)
BUN/CREAT BLD: ABNORMAL (ref 9–20)
C. TRACHOMATIS DNA ,URINE: NEGATIVE
CALCIUM SERPL-MCNC: 8.5 MG/DL (ref 8.6–10.4)
CHLORIDE BLD-SCNC: 106 MMOL/L (ref 98–107)
CO2: 21 MMOL/L (ref 20–31)
CREAT SERPL-MCNC: 0.45 MG/DL (ref 0.5–0.9)
CULTURE: NORMAL
DIFFERENTIAL TYPE: ABNORMAL
EOSINOPHILS RELATIVE PERCENT: 2 % (ref 1–4)
GFR AFRICAN AMERICAN: >60 ML/MIN
GFR NON-AFRICAN AMERICAN: >60 ML/MIN
GFR SERPL CREATININE-BSD FRML MDRD: ABNORMAL ML/MIN/{1.73_M2}
GFR SERPL CREATININE-BSD FRML MDRD: ABNORMAL ML/MIN/{1.73_M2}
GLUCOSE BLD-MCNC: 84 MG/DL (ref 70–99)
HCT VFR BLD CALC: 41.2 % (ref 36.3–47.1)
HEMOGLOBIN: 12.5 G/DL (ref 11.9–15.1)
IMMATURE GRANULOCYTES: 1 %
INR BLD: 0.9
LYMPHOCYTES # BLD: 22 % (ref 24–43)
Lab: NORMAL
MCH RBC QN AUTO: 26.8 PG (ref 25.2–33.5)
MCHC RBC AUTO-ENTMCNC: 30.3 G/DL (ref 28.4–34.8)
MCV RBC AUTO: 88.2 FL (ref 82.6–102.9)
MONOCYTES # BLD: 8 % (ref 3–12)
N. GONORRHOEAE DNA, URINE: NEGATIVE
NRBC AUTOMATED: 0 PER 100 WBC
PDW BLD-RTO: 12.3 % (ref 11.8–14.4)
PLATELET # BLD: 273 K/UL (ref 138–453)
PLATELET ESTIMATE: ABNORMAL
PMV BLD AUTO: 11.6 FL (ref 8.1–13.5)
POTASSIUM SERPL-SCNC: 4.1 MMOL/L (ref 3.7–5.3)
PROTHROMBIN TIME: 9.7 SEC (ref 9–12)
RBC # BLD: 4.67 M/UL (ref 3.95–5.11)
RBC # BLD: ABNORMAL 10*6/UL
SEG NEUTROPHILS: 67 % (ref 36–65)
SEGMENTED NEUTROPHILS ABSOLUTE COUNT: 10.11 K/UL (ref 1.5–8.1)
SODIUM BLD-SCNC: 139 MMOL/L (ref 135–144)
SPECIMEN DESCRIPTION: NORMAL
SPECIMEN DESCRIPTION: NORMAL
WBC # BLD: 15.1 K/UL (ref 3.5–11.3)
WBC # BLD: ABNORMAL 10*3/UL

## 2019-07-26 PROCEDURE — 6370000000 HC RX 637 (ALT 250 FOR IP): Performed by: NURSE PRACTITIONER

## 2019-07-26 PROCEDURE — 85025 COMPLETE CBC W/AUTO DIFF WBC: CPT

## 2019-07-26 PROCEDURE — 6370000000 HC RX 637 (ALT 250 FOR IP): Performed by: FAMILY MEDICINE

## 2019-07-26 PROCEDURE — 36415 COLL VENOUS BLD VENIPUNCTURE: CPT

## 2019-07-26 PROCEDURE — 99238 HOSP IP/OBS DSCHRG MGMT 30/<: CPT | Performed by: INTERNAL MEDICINE

## 2019-07-26 PROCEDURE — 80048 BASIC METABOLIC PNL TOTAL CA: CPT

## 2019-07-26 PROCEDURE — 85610 PROTHROMBIN TIME: CPT

## 2019-07-26 PROCEDURE — 6370000000 HC RX 637 (ALT 250 FOR IP): Performed by: INTERNAL MEDICINE

## 2019-07-26 RX ORDER — CEPHALEXIN 500 MG/1
500 CAPSULE ORAL 3 TIMES DAILY
Qty: 21 CAPSULE | Refills: 0 | Status: SHIPPED | OUTPATIENT
Start: 2019-07-26 | End: 2019-08-02

## 2019-07-26 RX ORDER — POLYETHYLENE GLYCOL 3350 17 G/17G
17 POWDER, FOR SOLUTION ORAL DAILY PRN
Qty: 14 EACH | Refills: 1 | Status: SHIPPED | OUTPATIENT
Start: 2019-07-26 | End: 2019-08-06

## 2019-07-26 RX ADMIN — POLYETHYLENE GLYCOL 3350 17 G: 17 POWDER, FOR SOLUTION ORAL at 08:53

## 2019-07-26 RX ADMIN — DOCUSATE SODIUM 100 MG: 100 CAPSULE, LIQUID FILLED ORAL at 08:53

## 2019-07-26 RX ADMIN — Medication 1 TABLET: at 08:53

## 2019-07-26 NOTE — DISCHARGE SUMMARY
edema        Significant therapeutic interventions: As above    Significant Diagnostic Studies:   Labs / Micro:  CBC:   Lab Results   Component Value Date    WBC 15.1 07/26/2019    RBC 4.67 07/26/2019    HGB 12.5 07/26/2019    HCT 41.2 07/26/2019    MCV 88.2 07/26/2019    MCH 26.8 07/26/2019    MCHC 30.3 07/26/2019    RDW 12.3 07/26/2019     07/26/2019     BMP:    Lab Results   Component Value Date    GLUCOSE 84 07/26/2019     07/26/2019    K 4.1 07/26/2019     07/26/2019    CO2 21 07/26/2019    ANIONGAP 12 07/26/2019    BUN 12 07/26/2019    CREATININE 0.45 07/26/2019    BUNCRER NOT REPORTED 07/26/2019    CALCIUM 8.5 07/26/2019    LABGLOM >60 07/26/2019    GFRAA >60 07/26/2019    GFR      07/26/2019    GFR NOT REPORTED 07/26/2019        Radiology:  Ct Abdomen Pelvis W Iv Contrast Additional Contrast? None    Result Date: 7/24/2019  1. No evidence of acute appendicitis. 2. Mild right hydroureteronephrosis likely secondary to an enlarged postpartum uterus. No obstructing stone. Consultations:    Consults:     Final Specialist Recommendations/Findings:   IP CONSULT TO OB GYN  IP CONSULT TO GENERAL SURGERY  IP CONSULT TO INTERNAL MEDICINE  IP CONSULT TO HOSPITALIST  IP CONSULT TO UROLOGY  IP CONSULT TO PHARMACY      The patient was seen and examined on day of discharge and this discharge summary is in conjunction with any daily progress note from day of discharge.     Discharge plan:     Disposition: Home    Physician Follow Up:     Fran Perez MD  [address] [place] [address]  [address]  [address] [facility] [address] [facility]    In 4 weeks      your gynecologist as scheduled    In 5 days         Requiring Further Evaluation/Follow Up POST HOSPITALIZATION/Incidental Findings: urine culture to be followed by gynecology    Diet: regular diet    Activity: As tolerated        Discharge Medications:      Medication List      START taking these medications    cephALEXin 500 MG capsule  Commonly known as:  Jostin Reynoso

## 2019-07-31 LAB
CULTURE: NORMAL
CULTURE: NORMAL
Lab: NORMAL
Lab: NORMAL
SPECIMEN DESCRIPTION: NORMAL
SPECIMEN DESCRIPTION: NORMAL

## 2019-08-02 ENCOUNTER — POSTPARTUM VISIT (OUTPATIENT)
Dept: OBGYN CLINIC | Age: 29
End: 2019-08-02

## 2019-08-02 VITALS
BODY MASS INDEX: 30.77 KG/M2 | HEIGHT: 62 IN | SYSTOLIC BLOOD PRESSURE: 110 MMHG | DIASTOLIC BLOOD PRESSURE: 70 MMHG | WEIGHT: 167.2 LBS

## 2019-08-02 PROCEDURE — 99024 POSTOP FOLLOW-UP VISIT: CPT | Performed by: OBSTETRICS & GYNECOLOGY

## 2019-08-02 PROCEDURE — 1111F DSCHRG MED/CURRENT MED MERGE: CPT | Performed by: OBSTETRICS & GYNECOLOGY

## 2019-08-02 PROCEDURE — G8428 CUR MEDS NOT DOCUMENT: HCPCS | Performed by: OBSTETRICS & GYNECOLOGY

## 2019-08-02 PROCEDURE — 1036F TOBACCO NON-USER: CPT | Performed by: OBSTETRICS & GYNECOLOGY

## 2019-08-02 PROCEDURE — G8417 CALC BMI ABV UP PARAM F/U: HCPCS | Performed by: OBSTETRICS & GYNECOLOGY

## 2019-08-02 NOTE — PROGRESS NOTES
The patient was seen. [id number]. She has no complaints today. She delivered a boy vaginally on 7/20. He was in NICU for 11 days (5lb4 oz). She is breast feeding and she is not displaying signs/symptoms of mastitis. She does have signs/symptoms of post partum depression. The baby's father is not involved, even though he said he would be. She wanted her children to have the same father, but she regrets that, now. Today her lochia is light she denies any dizziness or shortness of breath. Her pregnancy was complicated by the social situation, abnormal pap, and HSV2. She does have good home support, except for FOB. We discussed her birth control options. She plans abstinence, but I suggest that she consider IUD. I offer to call in Zoloft (though that won't change her social situation) and she accepts. Blood pressure 110/70, height 5' 2\" (1.575 m), weight 167 lb 3.2 oz (75.8 kg), last menstrual period 11/20/2018, currently breastfeeding. Abdomen: Soft and non-tender; good bowel sounds; no guarding, rebound or rigidity; no CVA tenderness bilaterally. Extremities: No calf tenderness bilaterally. DTR 2/4 bilaterally. No edema. Assessment:   Diagnosis Orders   1. Postpartum care following vaginal delivery       Chief Complaint   Patient presents with    Postpartum Care     2  weeks post vag delivery @ 34w 4d on 7/20/19 @ [facility] with Dr Adam platt Breast pumping + bottle             Plan:  1. Return to the office in  3-4 weeks  2. Signs & Symptoms of mastitis reviewed; notify if occurs  3. Secondary smoke risks reviewed. Increased risks of respiratory problems, Sudden     infant death syndrome, and potential malignancies. 4. Abstinence  5.  Family planning counseling and STD counseling completed

## 2019-08-06 ENCOUNTER — HOSPITAL ENCOUNTER (OUTPATIENT)
Age: 29
Setting detail: SPECIMEN
Discharge: HOME | End: 2019-08-06
Payer: MEDICARE

## 2019-08-06 ENCOUNTER — TELEPHONE (OUTPATIENT)
Dept: OBGYN CLINIC | Age: 29
End: 2019-08-06

## 2019-08-06 ENCOUNTER — POSTPARTUM VISIT (OUTPATIENT)
Dept: OBGYN CLINIC | Age: 29
End: 2019-08-06
Payer: MEDICARE

## 2019-08-06 VITALS
SYSTOLIC BLOOD PRESSURE: 114 MMHG | BODY MASS INDEX: 30.55 KG/M2 | HEIGHT: 62 IN | HEART RATE: 80 BPM | WEIGHT: 166 LBS | DIASTOLIC BLOOD PRESSURE: 78 MMHG | RESPIRATION RATE: 18 BRPM

## 2019-08-06 DIAGNOSIS — N89.8 VAGINAL ITCHING: ICD-10-CM

## 2019-08-06 DIAGNOSIS — N89.8 VAGINAL ITCHING: Primary | ICD-10-CM

## 2019-08-06 LAB
DIRECT EXAM: NORMAL
Lab: NORMAL
SPECIMEN DESCRIPTION: NORMAL

## 2019-08-06 PROCEDURE — 1111F DSCHRG MED/CURRENT MED MERGE: CPT | Performed by: NURSE PRACTITIONER

## 2019-08-06 PROCEDURE — 99213 OFFICE O/P EST LOW 20 MIN: CPT | Performed by: NURSE PRACTITIONER

## 2019-08-06 PROCEDURE — G8417 CALC BMI ABV UP PARAM F/U: HCPCS | Performed by: NURSE PRACTITIONER

## 2019-08-06 PROCEDURE — G8427 DOCREV CUR MEDS BY ELIG CLIN: HCPCS | Performed by: NURSE PRACTITIONER

## 2019-08-06 PROCEDURE — 1036F TOBACCO NON-USER: CPT | Performed by: NURSE PRACTITIONER

## 2019-08-06 NOTE — TELEPHONE ENCOUNTER
34 y/o Postpartum  del  at 29 wk 19. Pt calling unsure if she is having yeast infection or herpes outbreak. Pt states she had blood work done in past, tested HSV positive but had never had an outbreak. Offered pt same day appt today.

## 2019-08-07 DIAGNOSIS — Z87.448 HISTORY OF ACUTE PYELONEPHRITIS: ICD-10-CM

## 2019-08-07 DIAGNOSIS — R82.998 DARK BROWN-COLORED URINE: Primary | ICD-10-CM

## 2019-08-30 ENCOUNTER — POSTPARTUM VISIT (OUTPATIENT)
Dept: OBGYN CLINIC | Age: 29
End: 2019-08-30
Payer: MEDICARE

## 2019-08-30 VITALS
DIASTOLIC BLOOD PRESSURE: 72 MMHG | HEIGHT: 62 IN | SYSTOLIC BLOOD PRESSURE: 120 MMHG | WEIGHT: 173.2 LBS | BODY MASS INDEX: 31.87 KG/M2

## 2019-08-30 DIAGNOSIS — Z76.89 ENCOUNTER TO ESTABLISH CARE: ICD-10-CM

## 2019-08-30 PROCEDURE — G8417 CALC BMI ABV UP PARAM F/U: HCPCS | Performed by: OBSTETRICS & GYNECOLOGY

## 2019-08-30 PROCEDURE — 99213 OFFICE O/P EST LOW 20 MIN: CPT | Performed by: OBSTETRICS & GYNECOLOGY

## 2019-08-30 PROCEDURE — 1036F TOBACCO NON-USER: CPT | Performed by: OBSTETRICS & GYNECOLOGY

## 2019-08-30 PROCEDURE — G8428 CUR MEDS NOT DOCUMENT: HCPCS | Performed by: OBSTETRICS & GYNECOLOGY

## 2019-08-30 RX ORDER — CEPHALEXIN 500 MG/1
500 CAPSULE ORAL 4 TIMES DAILY
Qty: 28 CAPSULE | Refills: 0 | Status: SHIPPED | OUTPATIENT
Start: 2019-08-30 | End: 2019-09-06

## 2019-08-30 NOTE — PROGRESS NOTES
Patient currently without PCP, referral made to [facility] to get established and referral made to Dr. Reno Puga. Amy Saldana

## 2019-08-30 NOTE — PROGRESS NOTES
Patient is a 33 y.o. [id number]  seen with total face to face time of 15 minutes. More than 50% of this visit was on counseling and education regarding her postpartum condition and complications and her options. She was also counseled on her preventative health maintenance recommendations and follow-up. Blood pressure 120/72, height 5' 2\" (1.575 m), weight 173 lb 3.2 oz (78.6 kg), last menstrual period 11/20/2018, currently breastfeeding. Recent Results (from the past 8736 hour(s))   N. GONORRHOEAE CULTURE    Collection Time: 01/16/19 12:00 AM   Result Value Ref Range    Culture, Gonorrhoeae NEGATIVE    Chlamydia Culture    Collection Time: 01/16/19 12:00 AM   Result Value Ref Range    CHLAMYDIA CULTURE NEGATIVE    GYN Cytology    Collection Time: 01/16/19  9:50 AM   Result Value Ref Range    Cytology Report       (NOTE)  [id number]  [facility]  [facility]  ANATOMIC PATHOLOGY  [address] [place] [address]. [place], [address]  [phone]  Fax: [phone]  GYNECOLOGIC CYTOLOGY REPORT    Patient Name: Yoanna Perez  MR#: [id number]  Specimen #[id number]  Source:  1: Cervical material, (ThinPrep vial, Imaging-assisted review)    Clinical History  Amenorrhea: N91.2  R87.612 Cytology smear of cervix with LSIL  High risk HPV DNA testing is requested if the diagnosis is abnormal    INTERPRETATION    Cervical material, (ThinPrep vial, Imaging-assisted review):  Specimen Adequacy:      Satisfactory for evaluation.      - Endocervical/transformation zone component present. - Scant cellularity; predominantly blood. Descriptive Diagnosis:      Negative for intraepithelial lesion or malignancy. Cytotechnologist:   ROLDAN Yadav CD(ASCP)  **Electronically Signed Out**  es/2/6/2019     CBC Auto Differential    Collection Time: 01/16/19  3:25 PM   Result Value Ref Range    WBC 19.5 (H) 3.5 - 11.3 k/uL    RBC 4.47 3.95 - 5.11 m/uL    Hemoglobin 12.7 11.9 - 15.1 g/dL lesions:     No    Placental size:     16.0 x 15.0 x 2.5 cm  Shape:     Ovoid  Weight:     308 grams  Number of cassettes:     3cs  tm      Microscopic Description       Umbilical cord:     Unremarkable       Membranes:     Unremarkable  Meconium staining:     No  Infarcts:     No  Intervillous thrombi:     No  Subchorionic fibrin:     Not significantly increased  Villous maturation:     Appropriate  Nucleated erythrocytes in     villous capillaries:     Not increased  Other:     Few microcalcifications       Urinalysis Reflex to Culture    Collection Time: 07/24/19  6:39 PM   Result Value Ref Range    Color, UA ORANGE (A) YELLOW    Turbidity UA CLOUDY (A) CLEAR    Glucose, Ur NEGATIVE NEGATIVE    Bilirubin Urine NEGATIVE NEGATIVE    Ketones, Urine NEGATIVE NEGATIVE    Specific Gravity, UA 1.011 1.005 - 1.030    Urine Hgb LARGE (A) NEGATIVE    pH, UA 8.5 (H) 5.0 - 8.0    Protein, UA NEGATIVE  Verified by sulfosalicylic acid test. (A) NEGATIVE    Urobilinogen, Urine Normal Normal    Nitrite, Urine NEGATIVE NEGATIVE    Leukocyte Esterase, Urine LARGE (A) NEGATIVE    Urinalysis Comments NOT REPORTED    Microscopic Urinalysis    Collection Time: 07/24/19  6:39 PM   Result Value Ref Range    -          WBC, UA TOO NUMEROUS TO COUNT 0 - 5 /HPF    RBC, UA TOO NUMEROUS TO COUNT 0 - 4 /HPF    Casts UA  0 - 8 /LPF     2 TO 5 HYALINE Reference range defined for non-centrifuged specimen. Crystals UA NOT REPORTED None /HPF    Epithelial Cells UA 0 TO 2 0 - 5 /HPF    Renal Epithelial, Urine NOT REPORTED 0 /HPF    Bacteria, UA NOT REPORTED None    Mucus, UA NOT REPORTED None    Trichomonas, UA NOT REPORTED None    Amorphous, UA NOT REPORTED None    Other Observations UA NOT REPORTED NOT REQ.     Yeast, UA NOT REPORTED None   Protein / creatinine ratio, urine    Collection Time: 07/24/19  6:39 PM   Result Value Ref Range    Total Protein, Urine 21 mg/dL    Creatinine, Ur 40.7 28.0 - 217.0 mg/dL    Urine Total Protein Creatinine Acid    Collection Time: 07/24/19  6:59 PM   Result Value Ref Range    Uric Acid 5.1 2.4 - 5.7 mg/dL   Lipase    Collection Time: 07/24/19  6:59 PM   Result Value Ref Range    Lipase 20 13 - 60 U/L   Urine Culture    Collection Time: 07/24/19  7:27 PM   Result Value Ref Range    Specimen Description . CLEAN CATCH URINE     Special Requests NOT REPORTED     Culture NO SIGNIFICANT GROWTH    Protein / Creatinine Ratio, Urine    Collection Time: 07/25/19  2:40 AM   Result Value Ref Range    Total Protein, Urine 13 mg/dL    Creatinine, Ur 30.3 28.0 - 217.0 mg/dL    Urine Total Protein Creatinine Ratio 0.43 (H) 0.00 - 0.20   CULTURE BLOOD #1    Collection Time: 07/25/19 11:43 AM   Result Value Ref Range    Specimen Description . BLOOD     Special Requests LEFT HAND 6 ML     Culture NO GROWTH 6 DAYS    CULTURE BLOOD #2    Collection Time: 07/25/19 11:46 AM   Result Value Ref Range    Specimen Description . BLOOD     Special Requests LEFT FA 6ML     Culture NO GROWTH 6 DAYS    CBC Auto Differential    Collection Time: 07/25/19 11:46 AM   Result Value Ref Range    WBC 16.9 (H) 3.5 - 11.3 k/uL    RBC 4.88 3.95 - 5.11 m/uL    Hemoglobin 13.6 11.9 - 15.1 g/dL    Hematocrit 43.1 36.3 - 47.1 %    MCV 88.3 82.6 - 102.9 fL    MCH 27.9 25.2 - 33.5 pg    MCHC 31.6 28.4 - 34.8 g/dL    RDW 12.2 11.8 - 14.4 %    Platelets 151 946 - 974 k/uL    MPV 11.4 8.1 - 13.5 fL    NRBC Automated 0.0 0.0 per 100 WBC    Differential Type NOT REPORTED     WBC Morphology NOT REPORTED     RBC Morphology NOT REPORTED     Platelet Estimate NOT REPORTED     Immature Granulocytes 0 0 %    Seg Neutrophils 64 36 - 66 %    Lymphocytes 28 24 - 44 %    Monocytes 5 1 - 7 %    Eosinophils % 3 1 - 4 %    Basophils 0 0 - 2 %    Absolute Immature Granulocyte 0.00 0.00 - 0.30 k/uL    Segs Absolute 10.81 (H) 1.8 - 7.7 k/uL    Absolute Lymph # 4.73 1.0 - 4.8 k/uL    Absolute Mono # 0.85 (H) 0.1 - 0.8 k/uL    Absolute Eos # 0.51 (H) 0.0 - 0.4 k/uL    Basophils Absolute 0.00 6-week postpartum visit and to check with her about the bleeding and her psychological state

## 2019-09-18 ENCOUNTER — OFFICE VISIT (OUTPATIENT)
Dept: OBGYN CLINIC | Age: 29
End: 2019-09-18
Payer: MEDICARE

## 2019-09-18 VITALS
BODY MASS INDEX: 31.28 KG/M2 | HEIGHT: 62 IN | WEIGHT: 170 LBS | DIASTOLIC BLOOD PRESSURE: 68 MMHG | SYSTOLIC BLOOD PRESSURE: 112 MMHG

## 2019-09-18 PROCEDURE — G8417 CALC BMI ABV UP PARAM F/U: HCPCS | Performed by: OBSTETRICS & GYNECOLOGY

## 2019-09-18 PROCEDURE — G8427 DOCREV CUR MEDS BY ELIG CLIN: HCPCS | Performed by: OBSTETRICS & GYNECOLOGY

## 2019-09-18 PROCEDURE — 1036F TOBACCO NON-USER: CPT | Performed by: OBSTETRICS & GYNECOLOGY

## 2019-09-18 PROCEDURE — 99213 OFFICE O/P EST LOW 20 MIN: CPT | Performed by: OBSTETRICS & GYNECOLOGY

## 2019-09-18 NOTE — PROGRESS NOTES
Constitutional:  Blood pressure 112/68, height 5' 2\" (1.575 m), weight 170 lb (77.1 kg), last menstrual period 09/10/2019, currently breastfeeding. General Appearance: This  is a well Developed, well Nourished, well groomed female. Her BMI was reviewed. Nutritional decision making was discussed. Skin:  There was a Normal Inspection of the skin. There were no rashes or lesions. Lymphatic:  No Lymph Nodes were Palpable in the neck , axilla or groin. Neck and EENT:  The neck was supple. There were no masses   The thyroid was not enlarged and had no masses. Perrla  Cardiovascular: The lungs were auscultated and found to be clear. There were no rales, rhonchi or wheezes. There was a good respiratory effort. The heart was in a regular rate and rhythm. There was no murmur appreciated. The patients extremities were without calf tenderness, edema, or varicosities. Abdomen: The abdomen was soft and non-tender. There were good bowel sounds in all quadrants and there was no guarding, rebound or rigidity. On evaluation there was no evidence of hepatosplenomegaly and there was no costal vertebral urvashi tenderness bilaterally. No hernias were appreciated. Psych: The patient had a normal Orientation to: Time, Place, Person, and Situation  There is no Mood / Affect changes  I finds no signs of postpartum depression  Breast:  (Chest)  The patients breasts were symmetrical.  There was no masses, discharge or retractions bilateral.  Both nipples were everted. Self breast exams were reviewed in detail. Literature was given. Pelvic Exam:  The external genitalia was without lesions or masses with a normal appearance. The vaginal vault was normal. There were no Cystocele, Rectocele, or enterocele appreciated. There was no vaginal discharge. The urethra is in a normal anatomic position. No masses or tenderness were noted. The bladder was smooth without masses, fullness, or tenderness.  The cervix was without lesions. There was no cervical motion tenderness. The uterus has returned to normal size. It was mobile, midline and regular. The  adnexa were palpated and noted no fullness, tenderness or masses in either region. Rectal Exam:  Noted good sphincter tone and no masses. Musculoskeletal:  Normal Gait and station was noted. Digits were evaluated without abnormal findings. Range of motion, stability and strength were evaluated and found to be appropriate for the patients age. Assessment:   Diagnosis Orders   1. Postpartum care following vaginal delivery     2. Postpartum depression       Chief Complaint   Patient presents with    Postpartum Care     6wk pp-depression, last pap 1-16-19 neg Ángel Foster 7[place]20-[place]        Plan:  1. Return to the office for annual exam  2. Signs & Symptoms of mastitis reviewed; notify if occurs  3. Secondary smoke risks reviewed. Increased risks of respiratory problems, Sudden infant death syndrome, and potential malignancies. 4. Birth control condoms  5.  She will continue Zoloft for now

## 2019-09-24 ENCOUNTER — OFFICE VISIT (OUTPATIENT)
Dept: FAMILY MEDICINE CLINIC | Age: 29
End: 2019-09-24
Payer: MEDICARE

## 2019-09-24 ENCOUNTER — HOSPITAL ENCOUNTER (OUTPATIENT)
Age: 29
Setting detail: SPECIMEN
Discharge: HOME | End: 2019-09-24
Payer: MEDICARE

## 2019-09-24 VITALS
TEMPERATURE: 97.5 F | WEIGHT: 170 LBS | BODY MASS INDEX: 31.09 KG/M2 | DIASTOLIC BLOOD PRESSURE: 74 MMHG | SYSTOLIC BLOOD PRESSURE: 128 MMHG | OXYGEN SATURATION: 98 % | HEART RATE: 101 BPM

## 2019-09-24 DIAGNOSIS — R10.9 LEFT FLANK PAIN: ICD-10-CM

## 2019-09-24 DIAGNOSIS — B07.0 PLANTAR WART OF LEFT FOOT: ICD-10-CM

## 2019-09-24 DIAGNOSIS — R10.9 LEFT FLANK PAIN: Primary | ICD-10-CM

## 2019-09-24 LAB
BILIRUBIN, POC: NORMAL
BLOOD URINE, POC: NORMAL
CLARITY, POC: NORMAL
COLOR, POC: YELLOW
GLUCOSE URINE, POC: NORMAL
KETONES, POC: NORMAL
LEUKOCYTE EST, POC: NORMAL
NITRITE, POC: NORMAL
PH, POC: 6
PROTEIN, POC: NORMAL
SPECIFIC GRAVITY, POC: >=1.03
UROBILINOGEN, POC: 0.2

## 2019-09-24 PROCEDURE — G8417 CALC BMI ABV UP PARAM F/U: HCPCS | Performed by: NURSE PRACTITIONER

## 2019-09-24 PROCEDURE — 99203 OFFICE O/P NEW LOW 30 MIN: CPT | Performed by: NURSE PRACTITIONER

## 2019-09-24 PROCEDURE — 17110 DESTRUCTION B9 LES UP TO 14: CPT | Performed by: NURSE PRACTITIONER

## 2019-09-24 PROCEDURE — 1036F TOBACCO NON-USER: CPT | Performed by: NURSE PRACTITIONER

## 2019-09-24 PROCEDURE — G8427 DOCREV CUR MEDS BY ELIG CLIN: HCPCS | Performed by: NURSE PRACTITIONER

## 2019-09-24 PROCEDURE — 81003 URINALYSIS AUTO W/O SCOPE: CPT | Performed by: NURSE PRACTITIONER

## 2019-09-26 LAB
CULTURE: NORMAL
Lab: NORMAL
SPECIMEN DESCRIPTION: NORMAL

## 2019-10-16 ENCOUNTER — TELEPHONE (OUTPATIENT)
Dept: OBGYN CLINIC | Age: 29
End: 2019-10-16

## 2019-10-23 ENCOUNTER — HOSPITAL ENCOUNTER (OUTPATIENT)
Age: 29
Setting detail: SPECIMEN
Discharge: HOME | End: 2019-10-23
Payer: MEDICARE

## 2019-10-23 ENCOUNTER — OFFICE VISIT (OUTPATIENT)
Dept: OBGYN CLINIC | Age: 29
End: 2019-10-23
Payer: MEDICARE

## 2019-10-23 VITALS
DIASTOLIC BLOOD PRESSURE: 76 MMHG | BODY MASS INDEX: 32.94 KG/M2 | HEIGHT: 62 IN | WEIGHT: 179 LBS | SYSTOLIC BLOOD PRESSURE: 128 MMHG

## 2019-10-23 DIAGNOSIS — R30.0 DYSURIA: ICD-10-CM

## 2019-10-23 DIAGNOSIS — B96.89 BV (BACTERIAL VAGINOSIS): Primary | ICD-10-CM

## 2019-10-23 DIAGNOSIS — N76.0 BV (BACTERIAL VAGINOSIS): ICD-10-CM

## 2019-10-23 DIAGNOSIS — N76.0 BV (BACTERIAL VAGINOSIS): Primary | ICD-10-CM

## 2019-10-23 DIAGNOSIS — B96.89 BV (BACTERIAL VAGINOSIS): ICD-10-CM

## 2019-10-23 LAB
-: NORMAL
AMORPHOUS: NORMAL
BACTERIA: NORMAL
BILIRUBIN URINE: NEGATIVE
CASTS UA: NORMAL /LPF (ref 0–8)
COLOR: YELLOW
COMMENT UA: ABNORMAL
CRYSTALS, UA: NORMAL /HPF
DIRECT EXAM: ABNORMAL
EPITHELIAL CELLS UA: NORMAL /HPF (ref 0–5)
GLUCOSE URINE: NEGATIVE
KETONES, URINE: NEGATIVE
LEUKOCYTE ESTERASE, URINE: NEGATIVE
Lab: ABNORMAL
MUCUS: NORMAL
NITRITE, URINE: NEGATIVE
OTHER OBSERVATIONS UA: NORMAL
PH UA: 6 (ref 5–8)
PROTEIN UA: NEGATIVE
RBC UA: NORMAL /HPF (ref 0–4)
RENAL EPITHELIAL, UA: NORMAL /HPF
SPECIFIC GRAVITY UA: 1.01 (ref 1–1.03)
SPECIMEN DESCRIPTION: ABNORMAL
TRICHOMONAS: NORMAL
TURBIDITY: CLEAR
URINE HGB: ABNORMAL
UROBILINOGEN, URINE: NORMAL
WBC UA: NORMAL /HPF (ref 0–5)
YEAST: NORMAL

## 2019-10-23 PROCEDURE — 1036F TOBACCO NON-USER: CPT | Performed by: OBSTETRICS & GYNECOLOGY

## 2019-10-23 PROCEDURE — G8427 DOCREV CUR MEDS BY ELIG CLIN: HCPCS | Performed by: OBSTETRICS & GYNECOLOGY

## 2019-10-23 PROCEDURE — 99213 OFFICE O/P EST LOW 20 MIN: CPT | Performed by: OBSTETRICS & GYNECOLOGY

## 2019-10-23 PROCEDURE — G8417 CALC BMI ABV UP PARAM F/U: HCPCS | Performed by: OBSTETRICS & GYNECOLOGY

## 2019-10-23 PROCEDURE — G8484 FLU IMMUNIZE NO ADMIN: HCPCS | Performed by: OBSTETRICS & GYNECOLOGY

## 2019-10-23 RX ORDER — NORGESTIMATE AND ETHINYL ESTRADIOL 0.25-0.035
1 KIT ORAL DAILY
Qty: 1 PACKET | Refills: 12 | Status: SHIPPED | OUTPATIENT
Start: 2019-10-23 | End: 2019-12-03

## 2019-12-03 ENCOUNTER — OFFICE VISIT (OUTPATIENT)
Dept: OBGYN CLINIC | Age: 29
End: 2019-12-03
Payer: MEDICARE

## 2019-12-03 VITALS
HEIGHT: 62 IN | DIASTOLIC BLOOD PRESSURE: 72 MMHG | BODY MASS INDEX: 33.13 KG/M2 | SYSTOLIC BLOOD PRESSURE: 104 MMHG | WEIGHT: 180 LBS

## 2019-12-03 DIAGNOSIS — N92.0 MENORRHAGIA WITH REGULAR CYCLE: Primary | ICD-10-CM

## 2019-12-03 PROCEDURE — G8417 CALC BMI ABV UP PARAM F/U: HCPCS | Performed by: NURSE PRACTITIONER

## 2019-12-03 PROCEDURE — G8484 FLU IMMUNIZE NO ADMIN: HCPCS | Performed by: NURSE PRACTITIONER

## 2019-12-03 PROCEDURE — 1036F TOBACCO NON-USER: CPT | Performed by: NURSE PRACTITIONER

## 2019-12-03 PROCEDURE — G8427 DOCREV CUR MEDS BY ELIG CLIN: HCPCS | Performed by: NURSE PRACTITIONER

## 2019-12-03 PROCEDURE — 99213 OFFICE O/P EST LOW 20 MIN: CPT | Performed by: NURSE PRACTITIONER

## 2019-12-03 ASSESSMENT — ENCOUNTER SYMPTOMS
DIARRHEA: 0
SHORTNESS OF BREATH: 0
ABDOMINAL PAIN: 0
COUGH: 0
BACK PAIN: 0
CONSTIPATION: 0
ABDOMINAL DISTENTION: 0

## 2021-05-11 ENCOUNTER — HOSPITAL ENCOUNTER (OUTPATIENT)
Age: 31
Setting detail: SPECIMEN
Discharge: HOME | End: 2021-05-11
Payer: MEDICARE

## 2021-05-11 ENCOUNTER — OFFICE VISIT (OUTPATIENT)
Dept: OBGYN CLINIC | Age: 31
End: 2021-05-11
Payer: MEDICARE

## 2021-05-11 VITALS
BODY MASS INDEX: 34.41 KG/M2 | HEIGHT: 62 IN | DIASTOLIC BLOOD PRESSURE: 64 MMHG | SYSTOLIC BLOOD PRESSURE: 128 MMHG | WEIGHT: 187 LBS

## 2021-05-11 DIAGNOSIS — R35.0 FREQUENT URINATION: ICD-10-CM

## 2021-05-11 DIAGNOSIS — M54.89 OTHER BACK PAIN, UNSPECIFIED CHRONICITY: ICD-10-CM

## 2021-05-11 DIAGNOSIS — R35.0 FREQUENT URINATION: Primary | ICD-10-CM

## 2021-05-11 PROCEDURE — 99213 OFFICE O/P EST LOW 20 MIN: CPT | Performed by: STUDENT IN AN ORGANIZED HEALTH CARE EDUCATION/TRAINING PROGRAM

## 2021-05-11 PROCEDURE — G8417 CALC BMI ABV UP PARAM F/U: HCPCS | Performed by: STUDENT IN AN ORGANIZED HEALTH CARE EDUCATION/TRAINING PROGRAM

## 2021-05-11 PROCEDURE — G8427 DOCREV CUR MEDS BY ELIG CLIN: HCPCS | Performed by: STUDENT IN AN ORGANIZED HEALTH CARE EDUCATION/TRAINING PROGRAM

## 2021-05-11 PROCEDURE — 1036F TOBACCO NON-USER: CPT | Performed by: STUDENT IN AN ORGANIZED HEALTH CARE EDUCATION/TRAINING PROGRAM
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
## 2021-05-12 DIAGNOSIS — N76.0 BACTERIAL VAGINITIS: Primary | ICD-10-CM

## 2021-05-12 DIAGNOSIS — B96.89 BACTERIAL VAGINITIS: Primary | ICD-10-CM

## 2021-05-12 LAB
DIRECT EXAM: ABNORMAL
Lab: ABNORMAL
SPECIMEN DESCRIPTION: ABNORMAL

## 2021-05-12 RX ORDER — METRONIDAZOLE 500 MG/1
500 TABLET ORAL 2 TIMES DAILY
Qty: 14 TABLET | Refills: 0 | Status: SHIPPED | OUTPATIENT
Start: 2021-05-12 | End: 2021-05-19

## 2021-05-12 NOTE — PROGRESS NOTES
CLINICAL PHARMACY NOTE: MEDS TO [address] Patient?: No  Total # of Prescriptions Filled: 1   The following medications were delivered to the patient:  · metronidazole  Total # of Interventions Completed: 0  Time Spent (min): 0    Additional Documentation: pt was discharged before receiving her medications to go home with. I reached out to the pt and transferred the medication to her local pharmacy. [facility] [address] [facility] in [place].  Transferred on 05.12.21 at 10:05am

## 2021-05-14 DIAGNOSIS — N30.00 ACUTE CYSTITIS WITHOUT HEMATURIA: Primary | ICD-10-CM

## 2021-05-14 LAB
CULTURE: ABNORMAL
Lab: ABNORMAL
SPECIMEN DESCRIPTION: ABNORMAL

## 2021-05-14 RX ORDER — NITROFURANTOIN 25; 75 MG/1; MG/1
100 CAPSULE ORAL 2 TIMES DAILY
Qty: 14 CAPSULE | Refills: 0 | Status: SHIPPED | OUTPATIENT
Start: 2021-05-14 | End: 2021-05-21

## 2021-06-08 ENCOUNTER — HOSPITAL ENCOUNTER (OUTPATIENT)
Age: 31
Setting detail: SPECIMEN
Discharge: HOME | End: 2021-06-08
Payer: MEDICARE

## 2021-06-08 ENCOUNTER — OFFICE VISIT (OUTPATIENT)
Dept: OBGYN CLINIC | Age: 31
End: 2021-06-08
Payer: MEDICARE

## 2021-06-08 VITALS
WEIGHT: 187 LBS | OXYGEN SATURATION: 98 % | BODY MASS INDEX: 34.41 KG/M2 | HEART RATE: 108 BPM | HEIGHT: 62 IN | SYSTOLIC BLOOD PRESSURE: 108 MMHG | DIASTOLIC BLOOD PRESSURE: 62 MMHG

## 2021-06-08 DIAGNOSIS — R35.0 FREQUENT URINATION: ICD-10-CM

## 2021-06-08 DIAGNOSIS — N89.8 VAGINAL DISCHARGE: Primary | ICD-10-CM

## 2021-06-08 DIAGNOSIS — N89.8 VAGINAL DISCHARGE: ICD-10-CM

## 2021-06-08 PROBLEM — O09.211 CURRENT PREGNANCY WITH HISTORY OF PRE-TERM LABOR IN FIRST TRIMESTER: Status: RESOLVED | Noted: 2019-02-18 | Resolved: 2021-06-08

## 2021-06-08 PROBLEM — O09.899 SHORT INTERVAL BETWEEN PREGNANCIES COMPLICATING PREGNANCY, ANTEPARTUM: Status: RESOLVED | Noted: 2019-02-18 | Resolved: 2021-06-08

## 2021-06-08 PROBLEM — Z3A.34 34 WEEKS GESTATION OF PREGNANCY: Status: RESOLVED | Noted: 2019-07-19 | Resolved: 2021-06-08

## 2021-06-08 PROBLEM — R82.81 PYURIA: Status: RESOLVED | Noted: 2019-07-25 | Resolved: 2021-06-08

## 2021-06-08 PROBLEM — R10.31 ABDOMINAL PAIN, RIGHT LOWER QUADRANT: Status: RESOLVED | Noted: 2019-07-25 | Resolved: 2021-06-08

## 2021-06-08 PROBLEM — O09.899 HIGH RISK PREGNANCY WITH LOW PAPPA (PREGNANCY-ASSOCIATED PLASMA PROTEIN A): Status: RESOLVED | Noted: 2019-02-28 | Resolved: 2021-06-08

## 2021-06-08 PROBLEM — O28.0 HIGH RISK PREGNANCY WITH LOW PAPPA (PREGNANCY-ASSOCIATED PLASMA PROTEIN A): Status: RESOLVED | Noted: 2019-02-28 | Resolved: 2021-06-08

## 2021-06-08 PROBLEM — R89.9 ABNORMAL LABORATORY TEST: Status: RESOLVED | Noted: 2019-07-20 | Resolved: 2021-06-08

## 2021-06-08 PROBLEM — O60.03 PRETERM LABOR IN THIRD TRIMESTER: Status: RESOLVED | Noted: 2019-07-20 | Resolved: 2021-06-08

## 2021-06-08 LAB
DIRECT EXAM: ABNORMAL
Lab: ABNORMAL
SPECIMEN DESCRIPTION: ABNORMAL

## 2021-06-08 PROCEDURE — G8427 DOCREV CUR MEDS BY ELIG CLIN: HCPCS | Performed by: STUDENT IN AN ORGANIZED HEALTH CARE EDUCATION/TRAINING PROGRAM

## 2021-06-08 PROCEDURE — 1036F TOBACCO NON-USER: CPT | Performed by: STUDENT IN AN ORGANIZED HEALTH CARE EDUCATION/TRAINING PROGRAM

## 2021-06-08 PROCEDURE — 99213 OFFICE O/P EST LOW 20 MIN: CPT | Performed by: STUDENT IN AN ORGANIZED HEALTH CARE EDUCATION/TRAINING PROGRAM

## 2021-06-08 PROCEDURE — G8417 CALC BMI ABV UP PARAM F/U: HCPCS | Performed by: STUDENT IN AN ORGANIZED HEALTH CARE EDUCATION/TRAINING PROGRAM
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
## 2021-06-09 DIAGNOSIS — B96.89 BACTERIAL VAGINITIS: Primary | ICD-10-CM

## 2021-06-09 DIAGNOSIS — B37.31 YEAST VAGINITIS: ICD-10-CM

## 2021-06-09 DIAGNOSIS — N76.0 BACTERIAL VAGINITIS: Primary | ICD-10-CM

## 2021-06-09 LAB
CULTURE: NORMAL
Lab: NORMAL
SPECIMEN DESCRIPTION: NORMAL

## 2021-06-09 RX ORDER — FLUCONAZOLE 150 MG/1
150 TABLET ORAL ONCE
Qty: 1 TABLET | Refills: 1 | Status: SHIPPED | OUTPATIENT
Start: 2021-06-09 | End: 2021-06-09

## 2021-06-09 RX ORDER — METRONIDAZOLE 500 MG/1
500 TABLET ORAL 2 TIMES DAILY
Qty: 14 TABLET | Refills: 0 | Status: SHIPPED | OUTPATIENT
Start: 2021-06-09 | End: 2021-06-16

## 2021-08-24 ENCOUNTER — TELEPHONE (OUTPATIENT)
Dept: OBGYN CLINIC | Age: 31
End: 2021-08-24

## 2021-08-24 DIAGNOSIS — B37.31 YEAST VAGINITIS: Primary | ICD-10-CM

## 2021-08-24 RX ORDER — FLUCONAZOLE 150 MG/1
150 TABLET ORAL ONCE
Qty: 1 TABLET | Refills: 1 | Status: SHIPPED | OUTPATIENT
Start: 2021-08-24 | End: 2021-08-24

## 2021-08-24 NOTE — TELEPHONE ENCOUNTER
32 y/o Pt calling asking to be treated for yeast infection. S/S:  -Tx antibiotic COVID 7/26/21  -having itching and thick white discharge    Advised:  -that she is overdue for annual exam, last pap 1/2019 with her last pregnancy. -informed her that will send her request to the provider    Pt not willing to schedule annual at this time without her work schedule.

## 2021-08-24 NOTE — TELEPHONE ENCOUNTER
Diflucan called in. Patient will need to make appt for Annual for future refills.     Thanks,    Jennyfer Hernandezandrea [id number] [facility]   8/24/2021, 2:22 PM

## 2021-09-11 NOTE — PROGRESS NOTES
Vaginitis: Patient complains of an abnormal vaginal discharge for 1 weeks. Vaginal symptoms include discharge described as white and milky, local irritation, vulvar itching and odor. Vulvar symptoms include discharge described as white and milky, local irritation and vulvar itching. STI Risk: Possible STD exposure   Discharge described as: normal and physiologic . Menstrual pattern: She had been bleeding regularly. Contraception: none encouraged condoms     There were no vitals taken for this visit. ALLERGIES:  NKDA  O-  Physical Exam  Genitourinary:     General: Normal vulva. Labia:         Right: No rash, tenderness, lesion or injury. Left: No rash, tenderness, lesion or injury. Vagina: Normal. No foreign body. No vaginal discharge, erythema, tenderness, bleeding or lesions. A.Vaginitis  options. Urinary frequency  P. Affirm collected and sent to lab  UA  Will treat w/Flagyl for BV or Diflucan if yeast pending results  She was also counseled on her preventative health maintenance recommendations and follow-up.   RTO annual exam and PRN

## 2021-09-13 ENCOUNTER — OFFICE VISIT (OUTPATIENT)
Dept: OBGYN CLINIC | Age: 31
End: 2021-09-13
Payer: MEDICARE

## 2021-09-13 ENCOUNTER — HOSPITAL ENCOUNTER (OUTPATIENT)
Age: 31
Setting detail: SPECIMEN
Discharge: HOME | End: 2021-09-13
Payer: MEDICARE

## 2021-09-13 VITALS
SYSTOLIC BLOOD PRESSURE: 104 MMHG | BODY MASS INDEX: 32.61 KG/M2 | HEIGHT: 62 IN | DIASTOLIC BLOOD PRESSURE: 64 MMHG | WEIGHT: 177.2 LBS

## 2021-09-13 DIAGNOSIS — N89.8 VAGINAL DISCHARGE: Primary | ICD-10-CM

## 2021-09-13 DIAGNOSIS — R35.0 URINARY FREQUENCY: ICD-10-CM

## 2021-09-13 PROCEDURE — 99213 OFFICE O/P EST LOW 20 MIN: CPT | Performed by: NURSE PRACTITIONER

## 2021-09-13 PROCEDURE — 1036F TOBACCO NON-USER: CPT | Performed by: NURSE PRACTITIONER

## 2021-09-13 PROCEDURE — G8427 DOCREV CUR MEDS BY ELIG CLIN: HCPCS | Performed by: NURSE PRACTITIONER

## 2021-09-13 PROCEDURE — 81003 URINALYSIS AUTO W/O SCOPE: CPT | Performed by: NURSE PRACTITIONER

## 2021-09-13 PROCEDURE — G8417 CALC BMI ABV UP PARAM F/U: HCPCS | Performed by: NURSE PRACTITIONER

## 2021-09-13 RX ORDER — AMOXICILLIN AND CLAVULANATE POTASSIUM 875; 125 MG/1; MG/1
1 TABLET, FILM COATED ORAL EVERY 12 HOURS
COMMUNITY
Start: 2021-09-08 | End: 2021-09-15

## 2021-09-13 RX ORDER — CIPROFLOXACIN 500 MG/1
500 TABLET, FILM COATED ORAL 2 TIMES DAILY
COMMUNITY
Start: 2021-09-08 | End: 2021-09-13

## 2021-09-14 DIAGNOSIS — N89.8 VAGINAL DISCHARGE: ICD-10-CM

## 2021-09-14 DIAGNOSIS — R35.0 URINARY FREQUENCY: ICD-10-CM

## 2021-09-14 LAB
BILIRUBIN URINE: NEGATIVE
COLOR: YELLOW
COMMENT UA: NORMAL
DIRECT EXAM: ABNORMAL
GLUCOSE URINE: NEGATIVE
KETONES, URINE: NEGATIVE
LEUKOCYTE ESTERASE, URINE: NEGATIVE
Lab: ABNORMAL
NITRITE, URINE: NEGATIVE
PH UA: 7 (ref 5–8)
PROTEIN UA: NEGATIVE
SPECIFIC GRAVITY UA: 1.02 (ref 1–1.03)
SPECIMEN DESCRIPTION: ABNORMAL
TURBIDITY: CLEAR
URINE HGB: NEGATIVE
UROBILINOGEN, URINE: NORMAL

## 2021-09-14 RX ORDER — CLINDAMYCIN PHOSPHATE 20 MG/G
CREAM VAGINAL
Qty: 1 EACH | Refills: 0 | Status: SHIPPED | OUTPATIENT
Start: 2021-09-14 | End: 2022-02-01

## 2021-11-30 NOTE — TELEPHONE ENCOUNTER
Note from [facility] pharmacy to acknowledge receipt of progesterone therapy for patient per Dr Arcelia Blackwood.  Scanned in to epic none

## 2022-02-01 ENCOUNTER — OFFICE VISIT (OUTPATIENT)
Dept: FAMILY MEDICINE CLINIC | Age: 32
End: 2022-02-01
Payer: MEDICARE

## 2022-02-01 VITALS
BODY MASS INDEX: 33.11 KG/M2 | WEIGHT: 181 LBS | OXYGEN SATURATION: 97 % | HEART RATE: 103 BPM | TEMPERATURE: 98.2 F | DIASTOLIC BLOOD PRESSURE: 72 MMHG | SYSTOLIC BLOOD PRESSURE: 116 MMHG

## 2022-02-01 DIAGNOSIS — Z00.01 ENCOUNTER FOR WELL ADULT EXAM WITH ABNORMAL FINDINGS: Primary | ICD-10-CM

## 2022-02-01 DIAGNOSIS — F32.A ANXIETY AND DEPRESSION: ICD-10-CM

## 2022-02-01 DIAGNOSIS — Z13.31 POSITIVE DEPRESSION SCREENING: ICD-10-CM

## 2022-02-01 DIAGNOSIS — Z13.29 THYROID DISORDER SCREENING: ICD-10-CM

## 2022-02-01 DIAGNOSIS — F41.9 ANXIETY AND DEPRESSION: ICD-10-CM

## 2022-02-01 DIAGNOSIS — Z13.220 SCREENING CHOLESTEROL LEVEL: ICD-10-CM

## 2022-02-01 PROCEDURE — 96160 PT-FOCUSED HLTH RISK ASSMT: CPT | Performed by: NURSE PRACTITIONER

## 2022-02-01 PROCEDURE — G8484 FLU IMMUNIZE NO ADMIN: HCPCS | Performed by: NURSE PRACTITIONER

## 2022-02-01 PROCEDURE — 99395 PREV VISIT EST AGE 18-39: CPT | Performed by: NURSE PRACTITIONER

## 2022-02-01 RX ORDER — VENLAFAXINE HYDROCHLORIDE 37.5 MG/1
37.5 CAPSULE, EXTENDED RELEASE ORAL DAILY
Qty: 30 CAPSULE | Refills: 3 | Status: SHIPPED
Start: 2022-02-01 | End: 2022-06-07 | Stop reason: CLARIF

## 2022-02-01 SDOH — ECONOMIC STABILITY: FOOD INSECURITY: WITHIN THE PAST 12 MONTHS, YOU WORRIED THAT YOUR FOOD WOULD RUN OUT BEFORE YOU GOT MONEY TO BUY MORE.: NEVER TRUE

## 2022-02-01 SDOH — ECONOMIC STABILITY: FOOD INSECURITY: WITHIN THE PAST 12 MONTHS, THE FOOD YOU BOUGHT JUST DIDN'T LAST AND YOU DIDN'T HAVE MONEY TO GET MORE.: NEVER TRUE

## 2022-02-01 ASSESSMENT — PATIENT HEALTH QUESTIONNAIRE - PHQ9
3. TROUBLE FALLING OR STAYING ASLEEP: 2
SUM OF ALL RESPONSES TO PHQ QUESTIONS 1-9: 8
1. LITTLE INTEREST OR PLEASURE IN DOING THINGS: 0
2. FEELING DOWN, DEPRESSED OR HOPELESS: 3
SUM OF ALL RESPONSES TO PHQ QUESTIONS 1-9: 8
4. FEELING TIRED OR HAVING LITTLE ENERGY: 0
9. THOUGHTS THAT YOU WOULD BE BETTER OFF DEAD, OR OF HURTING YOURSELF: 0
6. FEELING BAD ABOUT YOURSELF - OR THAT YOU ARE A FAILURE OR HAVE LET YOURSELF OR YOUR FAMILY DOWN: 0
SUM OF ALL RESPONSES TO PHQ QUESTIONS 1-9: 8
7. TROUBLE CONCENTRATING ON THINGS, SUCH AS READING THE NEWSPAPER OR WATCHING TELEVISION: 3
5. POOR APPETITE OR OVEREATING: 0
8. MOVING OR SPEAKING SO SLOWLY THAT OTHER PEOPLE COULD HAVE NOTICED. OR THE OPPOSITE, BEING SO FIGETY OR RESTLESS THAT YOU HAVE BEEN MOVING AROUND A LOT MORE THAN USUAL: 0
SUM OF ALL RESPONSES TO PHQ9 QUESTIONS 1 & 2: 3
SUM OF ALL RESPONSES TO PHQ QUESTIONS 1-9: 8
10. IF YOU CHECKED OFF ANY PROBLEMS, HOW DIFFICULT HAVE THESE PROBLEMS MADE IT FOR YOU TO DO YOUR WORK, TAKE CARE OF THINGS AT HOME, OR GET ALONG WITH OTHER PEOPLE: 1

## 2022-02-01 ASSESSMENT — SOCIAL DETERMINANTS OF HEALTH (SDOH): HOW HARD IS IT FOR YOU TO PAY FOR THE VERY BASICS LIKE FOOD, HOUSING, MEDICAL CARE, AND HEATING?: NOT HARD AT ALL

## 2022-02-01 NOTE — PATIENT INSTRUCTIONS
Well Visit, Ages 25 to 48: Care Instructions  Overview     Well visits can help you stay healthy. Your doctor has checked your overall health and may have suggested ways to take good care of yourself. Your doctor also may have recommended tests. At home, you can help prevent illness with healthy eating, regular exercise, and other steps. Follow-up care is a key part of your treatment and safety. Be sure to make and go to all appointments, and call your doctor if you are having problems. It's also a good idea to know your test results and keep a list of the medicines you take. How can you care for yourself at home? · Get screening tests that you and your doctor decide on. Screening helps find diseases before any symptoms appear. · Eat healthy foods. Choose fruits, vegetables, whole grains, protein, and low-fat dairy foods. Limit fat, especially saturated fat. Reduce salt in your diet. · Limit alcohol. If you are a man, have no more than 2 drinks a day or 14 drinks a week. If you are a woman, have no more than 1 drink a day or 7 drinks a week. · Get at least 30 minutes of physical activity on most days of the week. Walking is a good choice. You also may want to do other activities, such as running, swimming, cycling, or playing tennis or team sports. Discuss any changes in your exercise program with your doctor. · Reach and stay at a healthy weight. This will lower your risk for many problems, such as obesity, diabetes, heart disease, and high blood pressure. · Do not smoke or allow others to smoke around you. If you need help quitting, talk to your doctor about stop-smoking programs and medicines. These can increase your chances of quitting for good. · Care for your mental health. It is easy to get weighed down by worry and stress. Learn strategies to manage stress, like deep breathing and mindfulness, and stay connected with your family and community.  If you find you often feel sad or hopeless, talk with your doctor. Treatment can help. · Talk to your doctor about whether you have any risk factors for sexually transmitted infections (STIs). You can help prevent STIs if you wait to have sex with a new partner (or partners) until you've each been tested for STIs. It also helps if you use condoms (male or female condoms) and if you limit your sex partners to one person who only has sex with you. Vaccines are available for some STIs, such as HPV. · Use birth control if it's important to you to prevent pregnancy. Talk with your doctor about the choices available and what might be best for you. · If you think you may have a problem with alcohol or drug use, talk to your doctor. This includes prescription medicines (such as amphetamines and opioids) and illegal drugs (such as cocaine and methamphetamine). Your doctor can help you figure out what type of treatment is best for you. · Protect your skin from too much sun. When you're outdoors from 10 a.m. to 4 p.m., stay in the shade or cover up with clothing and a hat with a wide brim. Wear sunglasses that block UV rays. Even when it's cloudy, put broad-spectrum sunscreen (SPF 30 or higher) on any exposed skin. · See a dentist one or two times a year for checkups and to have your teeth cleaned. · Wear a seat belt in the car. When should you call for help? Watch closely for changes in your health, and be sure to contact your doctor if you have any problems or symptoms that concern you. Where can you learn more? Go to https://AqueSysrafael.[email or web address] org and sign in to your [employer] account. Enter P072 in the [facility] box to learn more about \"Well Visit, Ages 25 to 48: Care Instructions. \"     If you do not have an account, please click on the \"Sign Up Now\" link. Current as of: October 6, 2021               Content Version: 13.1  © 9071-7669 Healthwise, Incorporated. Care instructions adapted under license by [facility] ([facility]).  If you have questions about a medical condition or this instruction, always ask your healthcare professional. [address] any warranty or liability for your use of this information. A Healthy Lifestyle: Care Instructions  Your Care Instructions     A healthy lifestyle can help you feel good, stay at a healthy weight, and have plenty of energy for both work and play. A healthy lifestyle is something you can share with your whole family. A healthy lifestyle also can lower your risk for serious health problems, such as high blood pressure, heart disease, and diabetes. You can follow a few steps listed below to improve your health and the health of your family. Follow-up care is a key part of your treatment and safety. Be sure to make and go to all appointments, and call your doctor if you are having problems. It's also a good idea to know your test results and keep a list of the medicines you take. How can you care for yourself at home? · Do not eat too much sugar, fat, or fast foods. You can still have dessert and treats now and then. The goal is moderation. · Start small to improve your eating habits. Pay attention to portion sizes, drink less juice and soda pop, and eat more fruits and vegetables. ? Eat a healthy amount of food. A 3-ounce serving of meat, for example, is about the size of a deck of cards. Fill the rest of your plate with vegetables and whole grains. ? Limit the amount of soda and sports drinks you have every day. Drink more water when you are thirsty. ? Eat plenty of fruits and vegetables every day. Have an apple or some carrot sticks as an afternoon snack instead of a candy bar. Try to have fruits and/or vegetables at every meal.  · Make exercise part of your daily routine. You may want to start with simple activities, such as walking, bicycling, or slow swimming. Try to be active 30 to 60 minutes every day. You do not need to do all 30 to 60 minutes all at once.  For example, you can exercise 3 times a day for 10 or 20 minutes. Moderate exercise is safe for most people, but it is always a good idea to talk to your doctor before starting an exercise program.  · Keep moving. Galileo Lizarraga the lawn, work in the garden, or [employer]. Take the stairs instead of the elevator at work. · If you smoke, quit. People who smoke have an increased risk for heart attack, stroke, cancer, and other lung illnesses. Quitting is hard, but there are ways to boost your chance of quitting tobacco for good. ? Use nicotine gum, patches, or lozenges. ? Ask your doctor about stop-smoking programs and medicines. ? Keep trying. In addition to reducing your risk of diseases in the future, you will notice some benefits soon after you stop using tobacco. If you have shortness of breath or asthma symptoms, they will likely get better within a few weeks after you quit. · Limit how much alcohol you drink. Moderate amounts of alcohol (up to 2 drinks a day for men, 1 drink a day for women) are okay. But drinking too much can lead to liver problems, high blood pressure, and other health problems. Family health  If you have a family, there are many things you can do together to improve your health. · Eat meals together as a family as often as possible. · Eat healthy foods. This includes fruits, vegetables, lean meats and dairy, and whole grains. · Include your family in your fitness plan. Most people think of activities such as jogging or tennis as the way to fitness, but there are many ways you and your family can be more active. Anything that makes you breathe hard and gets your heart pumping is exercise. Here are some tips:  ? Walk to do errands or to take your child to school or the bus.  ? Go for a family bike ride after dinner instead of watching TV. Where can you learn more? Go to https://charnoldoeb.[email or web address] [email or web address] org and sign in to your [employer] account.  Enter R724 in the [facility] box to learn more about \"A Healthy Lifestyle: Care Instructions. \"     If you do not have an account, please click on the \"Sign Up Now\" link. Current as of: June 16, 2021               Content Version: 13.1  © 8958-6151 Healthwise, Incorporated. Care instructions adapted under license by [facility] ([facility]). If you have questions about a medical condition or this instruction, always ask your healthcare professional. [address] any warranty or liability for your use of this information.
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
## 2022-06-07 ENCOUNTER — OFFICE VISIT (OUTPATIENT)
Dept: OBGYN CLINIC | Age: 32
End: 2022-06-07
Payer: MEDICARE

## 2022-06-07 ENCOUNTER — HOSPITAL ENCOUNTER (OUTPATIENT)
Age: 32
Setting detail: SPECIMEN
Discharge: HOME | End: 2022-06-07

## 2022-06-07 VITALS
HEIGHT: 62 IN | BODY MASS INDEX: 34.27 KG/M2 | DIASTOLIC BLOOD PRESSURE: 84 MMHG | WEIGHT: 186.2 LBS | SYSTOLIC BLOOD PRESSURE: 108 MMHG

## 2022-06-07 DIAGNOSIS — R35.0 URINARY FREQUENCY: ICD-10-CM

## 2022-06-07 DIAGNOSIS — Z01.419 ENCOUNTER FOR GYNECOLOGICAL EXAMINATION: Primary | ICD-10-CM

## 2022-06-07 PROCEDURE — 81003 URINALYSIS AUTO W/O SCOPE: CPT | Performed by: NURSE PRACTITIONER

## 2022-06-07 PROCEDURE — 99395 PREV VISIT EST AGE 18-39: CPT | Performed by: NURSE PRACTITIONER

## 2022-06-07 RX ORDER — PNV NO.95/FERROUS FUM/FOLIC AC 28MG-0.8MG
1 TABLET ORAL DAILY
Qty: 30 TABLET | Refills: 12 | Status: SHIPPED | OUTPATIENT
Start: 2022-06-07

## 2022-06-07 ASSESSMENT — ENCOUNTER SYMPTOMS
DIARRHEA: 0
SHORTNESS OF BREATH: 0
COUGH: 0
BACK PAIN: 0
ABDOMINAL DISTENTION: 0
CONSTIPATION: 0
ABDOMINAL PAIN: 0
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
## 2022-06-08 LAB
BILIRUBIN URINE: NEGATIVE
COLOR: YELLOW
COMMENT UA: NORMAL
GLUCOSE URINE: NEGATIVE
KETONES, URINE: NEGATIVE
LEUKOCYTE ESTERASE, URINE: NEGATIVE
NITRITE, URINE: NEGATIVE
PH UA: 7 (ref 5–8)
PROTEIN UA: NEGATIVE
SPECIFIC GRAVITY UA: 1.01 (ref 1–1.03)
TURBIDITY: CLEAR
URINE HGB: NEGATIVE
UROBILINOGEN, URINE: NORMAL

## 2022-06-09 LAB
HPV SAMPLE: NORMAL
HPV, GENOTYPE 16: NOT DETECTED
HPV, GENOTYPE 18: NOT DETECTED
HPV, HIGH RISK OTHER: NOT DETECTED
HPV, INTERPRETATION: NORMAL
SPECIMEN DESCRIPTION: NORMAL

## 2022-06-12 LAB — CYTOLOGY REPORT: NORMAL

## 2022-06-27 ENCOUNTER — HOSPITAL ENCOUNTER (OUTPATIENT)
Age: 32
Setting detail: SPECIMEN
Discharge: HOME | End: 2022-06-27

## 2022-06-27 ENCOUNTER — OFFICE VISIT (OUTPATIENT)
Dept: OBGYN CLINIC | Age: 32
End: 2022-06-27
Payer: MEDICARE

## 2022-06-27 VITALS
DIASTOLIC BLOOD PRESSURE: 64 MMHG | HEIGHT: 62 IN | SYSTOLIC BLOOD PRESSURE: 104 MMHG | BODY MASS INDEX: 34.34 KG/M2 | WEIGHT: 186.6 LBS

## 2022-06-27 DIAGNOSIS — N89.8 VAGINAL DISCHARGE: ICD-10-CM

## 2022-06-27 DIAGNOSIS — N89.8 VAGINAL ODOR: ICD-10-CM

## 2022-06-27 DIAGNOSIS — N76.0 ACUTE VAGINITIS: Primary | ICD-10-CM

## 2022-06-27 PROCEDURE — 99213 OFFICE O/P EST LOW 20 MIN: CPT | Performed by: NURSE PRACTITIONER

## 2022-06-27 PROCEDURE — G8417 CALC BMI ABV UP PARAM F/U: HCPCS | Performed by: NURSE PRACTITIONER

## 2022-06-27 PROCEDURE — 1036F TOBACCO NON-USER: CPT | Performed by: NURSE PRACTITIONER

## 2022-06-27 PROCEDURE — G8427 DOCREV CUR MEDS BY ELIG CLIN: HCPCS | Performed by: NURSE PRACTITIONER

## 2022-06-28 LAB
CANDIDA SPECIES, DNA PROBE: NEGATIVE
GARDNERELLA VAGINALIS, DNA PROBE: POSITIVE
SOURCE: ABNORMAL
TRICHOMONAS VAGINALIS DNA: NEGATIVE

## 2022-06-28 RX ORDER — CLINDAMYCIN PHOSPHATE 20 MG/G
CREAM VAGINAL
Qty: 40 G | Refills: 0 | Status: SHIPPED | OUTPATIENT
Start: 2022-06-28 | End: 2022-08-02

## 2022-08-02 ENCOUNTER — INITIAL PRENATAL (OUTPATIENT)
Dept: OBGYN CLINIC | Age: 32
End: 2022-08-02
Payer: MEDICARE

## 2022-08-02 ENCOUNTER — HOSPITAL ENCOUNTER (OUTPATIENT)
Age: 32
Setting detail: SPECIMEN
Discharge: HOME | End: 2022-08-02

## 2022-08-02 VITALS — SYSTOLIC BLOOD PRESSURE: 120 MMHG | WEIGHT: 183 LBS | BODY MASS INDEX: 33.47 KG/M2 | DIASTOLIC BLOOD PRESSURE: 72 MMHG

## 2022-08-02 DIAGNOSIS — O09.91 HIGH-RISK PREGNANCY IN FIRST TRIMESTER: ICD-10-CM

## 2022-08-02 DIAGNOSIS — O09.891 HISTORY OF PRETERM DELIVERY, CURRENTLY PREGNANT IN FIRST TRIMESTER: ICD-10-CM

## 2022-08-02 DIAGNOSIS — Z3A.09 9 WEEKS GESTATION OF PREGNANCY: ICD-10-CM

## 2022-08-02 DIAGNOSIS — O09.91 HIGH-RISK PREGNANCY IN FIRST TRIMESTER: Primary | ICD-10-CM

## 2022-08-02 LAB
ABO/RH: NORMAL
AMPHETAMINE SCREEN URINE: NEGATIVE
ANTIBODY SCREEN: NEGATIVE
BARBITURATE SCREEN URINE: NEGATIVE
BENZODIAZEPINE SCREEN, URINE: NEGATIVE
BILIRUBIN URINE: NEGATIVE
CANNABINOID SCREEN URINE: NEGATIVE
COCAINE METABOLITE, URINE: NEGATIVE
COLOR: YELLOW
COMMENT UA: NORMAL
FENTANYL URINE: NEGATIVE
GLUCOSE URINE: NEGATIVE
HIV AG/AB: NONREACTIVE
KETONES, URINE: NEGATIVE
LEUKOCYTE ESTERASE, URINE: NEGATIVE
METHADONE SCREEN, URINE: NEGATIVE
NITRITE, URINE: NEGATIVE
OPIATES, URINE: NEGATIVE
OXYCODONE SCREEN URINE: NEGATIVE
PH UA: 7.5 (ref 5–8)
PHENCYCLIDINE, URINE: NEGATIVE
PROTEIN UA: NEGATIVE
SPECIFIC GRAVITY UA: 1.02 (ref 1–1.03)
TEST INFORMATION: NORMAL
TURBIDITY: CLEAR
URINE HGB: NEGATIVE
UROBILINOGEN, URINE: NORMAL

## 2022-08-02 PROCEDURE — 1036F TOBACCO NON-USER: CPT | Performed by: NURSE PRACTITIONER

## 2022-08-02 PROCEDURE — G8427 DOCREV CUR MEDS BY ELIG CLIN: HCPCS | Performed by: NURSE PRACTITIONER

## 2022-08-02 PROCEDURE — 99203 OFFICE O/P NEW LOW 30 MIN: CPT | Performed by: NURSE PRACTITIONER

## 2022-08-02 PROCEDURE — G8417 CALC BMI ABV UP PARAM F/U: HCPCS | Performed by: NURSE PRACTITIONER

## 2022-08-02 RX ORDER — CHOLECALCIFEROL (VITAMIN D3) 25 MCG
1 TABLET,CHEWABLE ORAL DAILY
Qty: 30 CAPSULE | Refills: 11 | Status: SHIPPED | OUTPATIENT
Start: 2022-08-02

## 2022-08-02 RX ORDER — DOCUSATE SODIUM 100 MG/1
100 CAPSULE, LIQUID FILLED ORAL 2 TIMES DAILY
Qty: 60 CAPSULE | Refills: 1 | Status: SHIPPED | OUTPATIENT
Start: 2022-08-02 | End: 2022-09-01

## 2022-08-02 NOTE — PATIENT INSTRUCTIONS
After Hours Number: You can call the office [phone]  or [phone]  Call if you have:  1. Bleeding like a period  2. Cramps or contractions greater than 2 hours  3. If you are leaking fluid  4. If you've a fever greater than 100°  5. If you feel as if baby is not moving  6. If you have continuous vomiting over 3-4 hours        Patient was counseled on weight gain in pregnancy with the following recommendation made based on her BMI:     [place]:  BMI <18.5: 28-40 lbs weight gain  BMI 18.5-24.9: 25-35 lbs weight gain   BMI 25-29.9: 15-25 lbs weight gain  BMI >/= 30: 11-20 lbs weight gain    Up to 16 weeks around 1 pound a month  16-28 weeks- 2-4 pounds a month  >28 weeks - around 1 pound a week due to increased growth and blood volume      Twins:  BMI <18.5: no reccomendations  BMI 18.5-24.9: 37-45 lbs weight gain  BMI 25-29.9: 31-50 lbs weight gain  BMI >/= 30: 25-42 lbs weight gain    During Pregnancy: Exercises  Your Care Instructions  Here are some examples of exercises to do during your pregnancy. Start each exercise slowly. Ease off the exercise if you start to have pain. Your doctor or physical therapist will tell you when you can start these exercises and which ones will work best for you. How to do the exercises  Neck rotation    Sit in a firm chair, or stand up straight. Keeping your chin level, turn your head to the right, and hold for 15 to 30 seconds. Turn your head to the left and hold for 15 to 30 seconds. Repeat 2 to 4 times to each side. Forward neck flexion    Sit in a firm chair, or stand up straight. Bend your head forward. Hold for 15 to 30 seconds. Repeat 2 to 4 times. Back press    Place your feet 10 to 12 inches from the wall. Rest your back flat against the wall and slide down the wall until your knees are slightly bent. Press your lower back against the wall by pulling in your stomach muscles.   Hold for 6 seconds, and then relax your stomach muscles and slide back up the wall. Repeat 8 to 12 times. Full body twist    Sit with your legs crossed. Reach your left hand toward your right foot, and place your right hand at your side for support. Slowly twist your torso to your right. Switch your hands and twist to your left. Repeat 2 to 4 times. Pelvic rocking    Kneeling on hands and knees, place your hands directly under your shoulders and your knees under your hips. Breathe in deeply. Tuck your head downward and round your back up, making a curve with your back in the shape of the letter C. Hold this position for a count of 6. Breathe out slowly and bring your head back up. Relax, keeping your back straight (don't allow it to curve toward the floor). Hold this for a count of 6. Do this exercise 8 times or to your comfort level. Pelvic tilt    Lie on your back. Keep your knees relaxed. Tighten your belly and buttocks muscles. At the same time, gently shift your pelvis upward. This should flatten the curve in your back. Hold for 6 seconds and then relax. Gradually increase the number of tilts you do each day, to your comfort level. Backward stretch    Kneel on hands and knees with your knees 8 to 10 inches apart, hands directly under your shoulders, and arms and back straight. Keeping your arms straight, slowly lower your buttocks toward your heels and tuck your head toward your knees. Hold for 15 to 30 seconds. Slowly return to the kneeling position. Repeat 2 to 4 times. Forward bend    Sit comfortably in a chair, with your arms relaxed. Slowly bend forward, allowing your arms to hang down in front of you. Lean only as far as you can without feeling discomfort or pressure on your belly. Hold for 15 to 30 seconds and then slowly sit up straight. Repeat 2 to 4 times or to your comfort level. Leg lift crawl    Kneeling on hands and knees, place your hands directly under your shoulders and straighten your arms.   Tighten your belly muscles by pulling in your belly button toward your spine. Be sure you continue to breathe normally and do not hold your breath. Lift your left knee and bring it toward your elbow. Slowly extend your leg behind you without completely straightening it. Be careful not to let your hip drop down. Avoid arching your back. Hold your leg behind you for about 6 seconds. Return to your starting position. Do the same exercise with your other leg. Repeat 8 to 12 times for each leg. Tailor sitting    Sit on the floor. Bring your feet close to your body while crossing your ankles. Hold this position for as long as you are comfortable. Tailor stretching    Sit on the floor with your back straight, legs about 12 inches apart, and feet relaxed outward. Stretch your hands forward toward your left foot, then sit up. Stretch your hands straight forward, then sit up. Stretch your hands forward toward your right foot, then sit up. Hold each stretch for 15 to 30 seconds. Repeat 2 to 4 times. Follow-up care is a key part of your treatment and safety. Be sure to make and go to all appointments, and call your doctor if you are having problems. It's also a good idea to know your test results and keep a list of the medicines you take. Where can you learn more? Go to https://SentillapeAwesome Mapseweb.ManyWho. org and sign in to your LifePics account. Enter L768 in the SocialMedia[employer] box to learn more about \"During Pregnancy: Exercises. \"     If you do not have an account, please click on the \"Sign Up Now\" link. Current as of: September 5, 2018  Content Version: 12.0  © 7015-6593 Healthwise, Incorporated. Care instructions adapted under license by [facility] [place] ([facility]). If you have questions about a medical condition or this instruction, always ask your healthcare professional. [address] any warranty or liability for your use of this information.          Nutrition During Pregnancy: Care Instructions  Your Care Instructions    Healthy eating when you are pregnant is important for you and your baby. It can help you feel well and have a successful pregnancy and delivery. During pregnancy your nutrition needs increase. Even if you have excellent eating habits, your doctor may recommend a multivitamin to make sure you get enough iron and folic acid. Many pregnant women wonder how much weight they should gain. In general, women who were at a healthy weight before they became pregnant should gain between 25 and 35 pounds. Women who were overweight before pregnancy are usually advised to gain 15 to 25 pounds. Women who were underweight before pregnancy are usually advised to gain 28 to 40 pounds. Your doctor will work with you to set a weight goal that is right for you. Gaining a healthy amount of weight helps you have a healthy baby. Follow-up care is a key part of your treatment and safety. Be sure to make and go to all appointments, and call your doctor if you are having problems. It's also a good idea to know your test results and keep a list of the medicines you take. How can you care for yourself at home? Eat plenty of fruits and vegetables. Include a variety of orange, yellow, and leafy dark-green vegetables every day. Choose whole-grain bread, cereal, and pasta. Good choices include whole wheat bread, whole wheat pasta, brown rice, and oatmeal.  Get 4 or more servings of milk and milk products each day. Good choices include nonfat or low-fat milk, yogurt, and cheese. If you cannot eat milk products, you can get calcium from calcium-fortified products such as orange juice, soy milk, and tofu. Other non-milk sources of calcium include leafy green vegetables, such as broccoli, kale, mustard greens, turnip greens, bok sasha, and brussels sprouts. If you eat meat, pick lower-fat types. Good choices include lean cuts of meat and chicken or turkey without the skin. Do not eat shark, swordfish, morgan mackerel, or tilefish. They have high levels of mercury, which is dangerous to your baby. You can eat up to 12 ounces a week of fish or shellfish that have low mercury levels. Good choices include shrimp, wild salmon, pollock, and catfish. Do not eat more than 6 ounces of tuna each week. Heat lunch meats (such as turkey, ham, or bologna) to 165°F before you eat them. This reduces your risk of getting sick from a kind of bacteria that can be found in lunch meats. Do not eat unpasteurized soft cheeses, such as brie, feta, fresh mozzarella, and blue cheese. They have a bacteria that could harm your baby. Limit caffeine. If you drink coffee or tea, have no more than 1 cup a day. Caffeine is also found in roe. Do not drink any alcohol. No amount of alcohol has been found to be safe during pregnancy. Do not diet or try to lose weight. For example, do not follow a low-carbohydrate diet. If you are overweight at the start of your pregnancy, your doctor will work with you to manage your weight gain. Tell your doctor about all vitamins and supplements you take. When should you call for help? Watch closely for changes in your health, and be sure to contact your doctor if you have any problems. Where can you learn more? Go to https://ShopReplyarnoldoeb.PeerJ. org and sign in to your Wysiwyg account. Enter Y785 in the [facility] box to learn more about \"Nutrition During Pregnancy: Care Instructions. \"     If you do not have an account, please click on the \"Sign Up Now\" link. Current as of: September 5, 2018  Content Version: 12.0  © 7105-2127 Healthwise, [employer]. Care instructions adapted under license by [facility] ([facility]). If you have questions about a medical condition or this instruction, always ask your healthcare professional. [address] any warranty or liability for your use of this information.          Managing Morning Sickness: Care Instructions  Your Care Instructions    For many women, the toughest part of early pregnancy is morning sickness. Morning sickness can range from mild nausea to severe nausea with bouts of vomiting. Symptoms may be worse in the morning, although they can strike at any time of the day or night. If you have nausea, vomiting, or both, look for safe measures that can bring you relief. You can take simple steps at home to manage morning sickness. These steps include changing what and when you eat and avoiding certain foods and smells. Some women find that acupuncture and acupressure wristbands also help. Follow-up care is a key part of your treatment and safety. Be sure to make and go to all appointments, and call your doctor if you are having problems. It's also a good idea to know your test results and keep a list of the medicines you take. How can you care for yourself at home? Keep food in your stomach, but not too much at once. Your nausea may be worse if your stomach is empty. Eat five or six small meals a day instead of three large meals. For morning nausea, eat a small snack, such as a couple of crackers or dry biscuits, before rising. Allow a few minutes for your stomach to settle before you get out of bed slowly. Drink plenty of fluids, enough so that your urine is light yellow or clear like water. If you have kidney, heart, or liver disease and have to limit fluids, talk with your doctor before you increase the amount of fluids you drink. Some women find that peppermint tea helps with nausea. Eat more protein, such as chicken, fish, lean meat, beans, nuts, and seeds. Eat carbohydrate foods, such as potatoes, whole-grain cereals, rice, and pasta. Avoid smells and foods that make you feel nauseated. Spicy or high-fat foods, citrus juice, milk, coffee, and tea with caffeine often make nausea worse. Do not drink alcohol. Do not smoke. Try not to be around others who smoke. If you need help quitting, talk to your doctor about stop-smoking programs and medicines. These can increase your chances of quitting for good. If you are taking iron supplements, ask your doctor if they are necessary. Iron can make nausea worse. Get lots of rest. Stress and fatigue can make your morning sickness worse. Ask your doctor about taking prescription medicine, or over-the-counter products such as vitamin B6, doxylamine, or april, to relieve your symptoms. Your doctor can tell you the doses that are safe for you. Take your prenatal vitamins at night on a full stomach. When should you call for help? Call 911 anytime you think you may need emergency care. For example, call if:    You passed out (lost consciousness). Call your doctor now or seek immediate medical care if:    You are sick to your stomach or cannot drink fluids. You have symptoms of dehydration, such as:  Dry eyes and a dry mouth. Passing only a little urine. Feeling thirstier than usual.     You are not able to keep down your medicine. You have pain in your belly or pelvis. Watch closely for changes in your health, and be sure to contact your doctor if:    You do not get better as expected. Where can you learn more? Go to https://Laguo.Exosite. org and sign in to your [email or web address] account. Enter X953 in the [employer] box to learn more about \"Managing Morning Sickness: Care Instructions. \"     If you do not have an account, please click on the \"Sign Up Now\" link. Current as of: September 5, 2018  Content Version: 12.0  © 0559-8758 Healthwise, Incorporated. Care instructions adapted under license by [facility] ([facility]). If you have questions about a medical condition or this instruction, always ask your healthcare professional. [address] any warranty or liability for your use of this information. Breastfeeding: Care Instructions  Overview      Breastfeeding has many benefits. It may lower your baby's chances of getting an infection.  It also may make it less likely that your baby will have problems such as diabetes and obesity later in life. Breastfeeding also helps you bond with your baby. In the first days after birth, your breasts make a thick, yellow liquid called colostrum. This liquid gives your baby nutrients and antibodies against infection. It is all that babies need in the first days after birth. Your breasts will fill with milk a few days after the birth. Breastfeeding is a skill that gets better with practice. Be patient with yourself and your baby. If you have trouble, you can get help and keep breastfeeding. Follow-up care is a key part of your treatment and safety. Be sure to make and go to all appointments, and call your doctor if you are having problems. It's also a good idea to know your test results and keep a list of the medicines you take. How can you care for yourself at home? Breastfeed your baby whenever he or she is hungry. In the first 2 weeks, your baby will feed about every 1 to 3 hours. That often works out to about 8 to 12 times in a 24-hour period. This will help you keep up your supply of milk. Signs that your baby is hungry include:  Sucking on his or her hands. [place] his or her lips. Turning his or her head toward your breast.  Put a bed pillow or a nursing pillow on your lap to support your arms and your baby. Hold your baby in a comfortable position. You can hold your baby in several ways. One of the most common positions is the cradle hold. One arm supports your baby, with his or her head in the bend of your elbow. Your open hand supports your baby's bottom or back. Your baby's belly lies against yours. If you had your baby by , or , try the football hold. This position keeps your baby off your belly. Tuck your baby under your arm, with his or her body along the side you will be feeding on. Support your baby's upper body with your arm.  With that hand you can control your baby's head to bring his or her mouth to your breast.  Try different positions with each feeding. If you are having problems, ask for help from your doctor or a lactation consultant. To get your baby to latch on:  Support and narrow your breast with one hand using a \"U hold,\" with your thumb on the outer side of your breast and your fingers on the inner side. You can also use a \"C hold,\" with all your fingers below the nipple and your thumb above it. Try the different holds to get the deepest latch for whichever breastfeeding position you use. Your other arm is behind your baby's back, with your hand supporting the base of the baby's head. Position your fingers and thumb to point toward your baby's ears. You can touch your baby's lower lip with your nipple to get your baby to open his or her mouth. Wait until your baby opens up really wide, like a big yawn. Then be sure to bring the baby quickly to your breast--not your breast to the baby. As you bring your baby toward your breast, use your other hand to support the breast and guide it into his or her mouth. Both the nipple and a large portion of the darker area around the nipple (areola) should be in the baby's mouth. The baby's lips should be flared outward, not folded in (inverted). Listen for a regular sucking and swallowing pattern while the baby is feeding. If you cannot see or hear a swallowing pattern, watch the baby's ears, which will wiggle slightly when the baby swallows. If the baby's nose appears to be blocked by your breast, bring your baby's body closer to you. This will help tilt the baby's head back slightly, so just the edge of one nostril is clear for breathing. When your baby is latched, you can usually remove your hand from supporting your breast and bring it under your baby to cradle him or her. Now just relax and breastfeed your baby. You will know that your baby is feeding well when:  His or her mouth covers a lot of the areola, and the lips are flared out.   His or her chin and nose rest against your breast.  Sucking is deep and rhythmic, with short pauses. You are able to see and hear your baby swallowing. You do not feel pain in your nipple. Offer both breasts to your baby at each feeding. Each time you breastfeed, switch which breast you start with. Anytime you need to remove your baby from the breast, put one finger in the corner of his or her mouth. Push your finger between your baby's gums to gently break the seal. If you do not break the tight seal before you remove your baby, your nipples can become sore, cracked, or bruised. After feeding your baby, gently pat his or her back to let out any swallowed air. After your baby burps, offer the breast again, or offer the other breast. Sometimes a baby will want to keep feeding after being burped. When should you call for help? Call your doctor now or seek immediate medical care if:    You have symptoms of a breast infection, such as: Increased pain, swelling, redness, or warmth around a breast.  Red streaks extending from the breast.  Pus draining from a breast.  A fever. Your baby has no wet diapers for 6 hours. Watch closely for changes in your health, and be sure to contact your doctor if:    Your baby has trouble latching on to your breast.     You continue to have pain or discomfort when breastfeeding. You have other questions or concerns. Where can you learn more? Go to https://Caesars of Wichitaarnoldoeb.ZENT. org and sign in to your Dasient account. Enter P492 in the [facility] box to learn more about \"Breastfeeding: Care Instructions. \"     If you do not have an account, please click on the \"Sign Up Now\" link. Current as of: September 5, 2018  Content Version: 12.0  © 7454-1685 Healthwise, Incorporated. Care instructions adapted under license by [facility] ([facility]).  If you have questions about a medical condition or this instruction, always ask your healthcare professional. [facility] disclaims any warranty or liability for your use of this information. Learning About Birth Defects Testing  What is birth defects testing? Birth defects testing is done during pregnancy to look for possible problems with the baby (fetus). A birth defect may have only a minor impact on a child's life. Or it can have a major effect on quality or length of life. You and your doctor can choose from many tests. You may have no tests, one test, or many tests. Talking with a genetic counselor may help you make decisions about testing. The counselor is trained to help you understand these tests. He or she can also help you find resources for support. What are the types of tests? You may have a screening test or a diagnostic test. Or you may have both types of tests. Screening tests show the chance that a baby has a certain birth defect, such as Down syndrome, spina bifida, or trisomy 25. Diagnostic tests show if a baby has a certain birth defect. Screening tests and when they are done:  Blood tests at 10 to 13 weeks (first trimester)  Cell free fetal DNA test at 10 weeks or later (first trimester)  Nuchal translucency test at 11 to 14 weeks (first trimester)  Triple or quad screening at 15 to 20 weeks (second trimester)  Ultrasound at 18 to 20 weeks (second trimester)  Diagnostic tests and when they are done:  Chorionic villus sampling (CVS) at 10 to 13 weeks (first trimester)  Amniocentesis at 15 to 20 weeks (second trimester)  In some cases, the doctors look at the combined screenings that you've had over a period of time. This is called an integrated screening. What are the screening tests? Blood tests are done to look at different substances in your blood. These tests include cell free fetal DNA and triple or quadruple (quad) blood tests. Both of these tests can help find genetic problems.   Nuchal translucency test uses ultrasound to measure the thickness of the area at the back of the baby's neck. An increase in thickness can be an early sign of certain birth defects. Ultrasound is a tool that uses sound waves to make pictures of your baby and placenta inside the uterus. Ultrasound lets your doctor see an image of your baby. It can help your doctor look for problems of the heart, spine, belly, or other areas. What are the diagnostic tests? Chorionic villus sampling (CVS) looks at cells from the placenta. To do the test, your doctor may put a thin tube through your vagina and cervix to take out a small piece of the placenta. Or the doctor may take out the piece through a needle in your belly. This test can diagnose many genetic diseases. But it can't find problems with the spinal cord. Amniocentesis looks at the amniotic fluid that surrounds your baby. Your doctor will put a needle through your belly into your uterus and take out a very small amount of fluid to test.  What are the risks of these tests? There is a small risk of a miscarriage after a CVS or amniocentesis. Your doctor or genetic counselor can help you understand this risk. These tests are generally very safe. Where can you learn more? Go to https://ChanyoujipeReliable Tire Disposal.Easy Eye. org and sign in to your [employer] [employer] account. Enter G030 in the [employer] box to learn more about \"Learning About Birth Defects Testing. \"     If you do not have an account, please click on the \"Sign Up Now\" link. Current as of: September 5, 2018  Content Version: 12.0  © 5902-3739 Healthwise, Incorporated. Care instructions adapted under license by [facility] ([facility]). If you have questions about a medical condition or this instruction, always ask your healthcare professional. [place] [address] any warranty or liability for your use of this information.
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
## 2022-08-03 LAB
ABSOLUTE EOS #: 0.09 K/UL (ref 0–0.44)
ABSOLUTE IMMATURE GRANULOCYTE: 0.04 K/UL (ref 0–0.3)
ABSOLUTE LYMPH #: 2.2 K/UL (ref 1.1–3.7)
ABSOLUTE MONO #: 0.86 K/UL (ref 0.1–1.2)
BASOPHILS # BLD: 0 % (ref 0–2)
BASOPHILS ABSOLUTE: 0.03 K/UL (ref 0–0.2)
C. TRACHOMATIS DNA ,URINE: NEGATIVE
CULTURE: NORMAL
EOSINOPHILS RELATIVE PERCENT: 1 % (ref 1–4)
HCT VFR BLD CALC: 41.8 % (ref 36.3–47.1)
HEMOGLOBIN: 13.3 G/DL (ref 11.9–15.1)
HEPATITIS B SURFACE ANTIGEN: NONREACTIVE
IMMATURE GRANULOCYTES: 0 %
LYMPHOCYTES # BLD: 17 % (ref 24–43)
MCH RBC QN AUTO: 28.2 PG (ref 25.2–33.5)
MCHC RBC AUTO-ENTMCNC: 31.8 G/DL (ref 28.4–34.8)
MCV RBC AUTO: 88.7 FL (ref 82.6–102.9)
MONOCYTES # BLD: 7 % (ref 3–12)
N. GONORRHOEAE DNA, URINE: NEGATIVE
NRBC AUTOMATED: 0 PER 100 WBC
PDW BLD-RTO: 12.5 % (ref 11.8–14.4)
PLATELET # BLD: 324 K/UL (ref 138–453)
PMV BLD AUTO: 10.8 FL (ref 8.1–13.5)
RBC # BLD: 4.71 M/UL (ref 3.95–5.11)
RUBV IGG SER QL: >500 IU/ML
SEG NEUTROPHILS: 75 % (ref 36–65)
SEGMENTED NEUTROPHILS ABSOLUTE COUNT: 10.09 K/UL (ref 1.5–8.1)
SPECIMEN DESCRIPTION: NORMAL
SPECIMEN DESCRIPTION: NORMAL
T. PALLIDUM, IGG: NONREACTIVE
THYROXINE, FREE: 2.31 NG/DL (ref 0.93–1.7)
TSH SERPL DL<=0.05 MIU/L-ACNC: <0.01 UIU/ML (ref 0.3–5)
WBC # BLD: 13.3 K/UL (ref 3.5–11.3)

## 2022-08-04 DIAGNOSIS — R79.89 LOW TSH LEVEL: Primary | ICD-10-CM

## 2022-08-24 ENCOUNTER — HOSPITAL ENCOUNTER (OUTPATIENT)
Age: 32
Discharge: HOME | End: 2022-08-24
Payer: MEDICARE

## 2022-08-24 ENCOUNTER — HOSPITAL ENCOUNTER (OUTPATIENT)
Age: 32
Setting detail: SPECIMEN
Discharge: HOME | End: 2022-08-24

## 2022-08-24 ENCOUNTER — ROUTINE PRENATAL (OUTPATIENT)
Dept: PERINATAL CARE | Age: 32
End: 2022-08-24
Payer: MEDICARE

## 2022-08-24 VITALS
DIASTOLIC BLOOD PRESSURE: 76 MMHG | HEIGHT: 62 IN | TEMPERATURE: 98.8 F | WEIGHT: 179 LBS | SYSTOLIC BLOOD PRESSURE: 119 MMHG | RESPIRATION RATE: 16 BRPM | BODY MASS INDEX: 32.94 KG/M2

## 2022-08-24 DIAGNOSIS — O99.211 OBESITY AFFECTING PREGNANCY IN FIRST TRIMESTER: ICD-10-CM

## 2022-08-24 DIAGNOSIS — Z3A.12 12 WEEKS GESTATION OF PREGNANCY: ICD-10-CM

## 2022-08-24 DIAGNOSIS — O09.211 CURRENT PREGNANCY WITH HISTORY OF PRE-TERM LABOR IN FIRST TRIMESTER: Primary | ICD-10-CM

## 2022-08-24 DIAGNOSIS — O09.291 HISTORY OF PRE-ECLAMPSIA IN PRIOR PREGNANCY, CURRENTLY PREGNANT IN FIRST TRIMESTER: ICD-10-CM

## 2022-08-24 DIAGNOSIS — O36.80X0 ENCOUNTER TO DETERMINE FETAL VIABILITY OF PREGNANCY, SINGLE OR UNSPECIFIED FETUS: ICD-10-CM

## 2022-08-24 DIAGNOSIS — Z36.9 FIRST TRIMESTER SCREENING: ICD-10-CM

## 2022-08-24 PROBLEM — Z87.59 HISTORY OF PRE-ECLAMPSIA: Status: ACTIVE | Noted: 2022-08-24

## 2022-08-24 LAB
CRL: NORMAL
SAC DIAMETER: NORMAL

## 2022-08-24 PROCEDURE — G8417 CALC BMI ABV UP PARAM F/U: HCPCS | Performed by: OBSTETRICS & GYNECOLOGY

## 2022-08-24 PROCEDURE — 76801 OB US < 14 WKS SINGLE FETUS: CPT | Performed by: OBSTETRICS & GYNECOLOGY

## 2022-08-24 PROCEDURE — 81508 FTL CGEN ABNOR TWO PROTEINS: CPT

## 2022-08-24 PROCEDURE — 99243 OFF/OP CNSLTJ NEW/EST LOW 30: CPT | Performed by: OBSTETRICS & GYNECOLOGY

## 2022-08-24 PROCEDURE — 76813 OB US NUCHAL MEAS 1 GEST: CPT | Performed by: OBSTETRICS & GYNECOLOGY

## 2022-08-24 PROCEDURE — G8427 DOCREV CUR MEDS BY ELIG CLIN: HCPCS | Performed by: OBSTETRICS & GYNECOLOGY

## 2022-08-24 PROCEDURE — 36415 COLL VENOUS BLD VENIPUNCTURE: CPT

## 2022-08-31 ENCOUNTER — ROUTINE PRENATAL (OUTPATIENT)
Dept: OBGYN CLINIC | Age: 32
End: 2022-08-31
Payer: MEDICARE

## 2022-08-31 VITALS — WEIGHT: 177.5 LBS | SYSTOLIC BLOOD PRESSURE: 118 MMHG | DIASTOLIC BLOOD PRESSURE: 60 MMHG | BODY MASS INDEX: 32.47 KG/M2

## 2022-08-31 DIAGNOSIS — O09.92 HIGH-RISK PREGNANCY IN SECOND TRIMESTER: Primary | ICD-10-CM

## 2022-08-31 DIAGNOSIS — Z3A.13 13 WEEKS GESTATION OF PREGNANCY: ICD-10-CM

## 2022-08-31 DIAGNOSIS — O09.899 HISTORY OF PRETERM DELIVERY, CURRENTLY PREGNANT: ICD-10-CM

## 2022-08-31 DIAGNOSIS — O99.210 OBESITY AFFECTING PREGNANCY, ANTEPARTUM: ICD-10-CM

## 2022-08-31 PROCEDURE — 99214 OFFICE O/P EST MOD 30 MIN: CPT | Performed by: NURSE PRACTITIONER

## 2022-08-31 PROCEDURE — G8427 DOCREV CUR MEDS BY ELIG CLIN: HCPCS | Performed by: NURSE PRACTITIONER

## 2022-08-31 PROCEDURE — G8417 CALC BMI ABV UP PARAM F/U: HCPCS | Performed by: NURSE PRACTITIONER

## 2022-08-31 PROCEDURE — 1036F TOBACCO NON-USER: CPT | Performed by: NURSE PRACTITIONER

## 2022-08-31 RX ORDER — ONDANSETRON 4 MG/1
4 TABLET, ORALLY DISINTEGRATING ORAL 3 TIMES DAILY PRN
Qty: 21 TABLET | Refills: 0 | Status: SHIPPED | OUTPATIENT
Start: 2022-08-31
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
## 2022-09-13 LAB
AFP INTERPRETATION: NORMAL
CRL: 73.1 MM
CROWN RUMP LENGTH TWIN B: NORMAL MM
CROWN RUMP LENGTH: NORMAL
DATE OF BIRTH: NORMAL
DONOR EGG?: NO
GESTATIONAL AGE: NORMAL
HISTORY OF ANEUPLOIDY?: NO
IN VITRO FERTILIZATION: NO
MATERNAL AGE AT EDD: 33.2 YR
MATERNAL SCREEN, EER: NORMAL
MATERNAL WEIGHT: 179
MOM FOR NT, TWIN B: NORMAL
MOM FOR NT: 1.04
MOM FOR PAPP-A: 0.56
MONOCHORIONIC TWINS: NORMAL
MSHCG-MOM: 1.06
MSHCG: NORMAL IU/L
NUCHAL TRANSLUC (NT): 1.8 MM
NUCHAL TRANSLUC (NT): NORMAL
NUCHAL TRANSLUCENCY TWIN B: NORMAL MM
NUMBER OF FETUSES: 1
NUMBER OF FETUSES: NORMAL
PAPP-A MOM: 927.4 NG/ML
PATIENT WEIGHT UNITS: NORMAL
PATIENT WEIGHT: NORMAL
PREV TRISOMY PREG: NO
RACE (MATERNAL): NORMAL
RACE: NORMAL
READING MD CERT NUM: NORMAL
READING MD NAME: NORMAL
REPEAT SPECIMEN?: NO
SMOKING: NO
SMOKING: NO
SONOGRAPHER CERT NO: NORMAL
SONOGRAPHER CERT NO: NORMAL
SONOGRAPHER NAME: NORMAL
SONOGRAPHER NAME: NORMAL
SPECIMEN: NORMAL
ULTRASOUND DATE: NORMAL
ULTRASOUND DATE: NORMAL

## 2022-09-19 ENCOUNTER — TELEPHONE (OUTPATIENT)
Dept: PERINATAL CARE | Age: 32
End: 2022-09-19

## 2022-09-19 NOTE — TELEPHONE ENCOUNTER
Pt in office for [address] and states has not heard from pharmacy about progesterone injections. Writer called into [facility] at [phone], 200mg progesterone capsules  insert  one into vagina nightly, 1 mos supply with 4 refills.

## 2022-09-21 ENCOUNTER — ROUTINE PRENATAL (OUTPATIENT)
Dept: PERINATAL CARE | Age: 32
End: 2022-09-21
Payer: MEDICARE

## 2022-09-21 ENCOUNTER — NURSE ONLY (OUTPATIENT)
Dept: OBGYN CLINIC | Age: 32
End: 2022-09-21
Payer: MEDICARE

## 2022-09-21 VITALS
RESPIRATION RATE: 16 BRPM | BODY MASS INDEX: 32.94 KG/M2 | WEIGHT: 179 LBS | HEART RATE: 107 BPM | DIASTOLIC BLOOD PRESSURE: 66 MMHG | SYSTOLIC BLOOD PRESSURE: 107 MMHG | HEIGHT: 62 IN | TEMPERATURE: 98.1 F

## 2022-09-21 DIAGNOSIS — O43.102 PLACENTAL ABNORMALITY IN SECOND TRIMESTER: ICD-10-CM

## 2022-09-21 DIAGNOSIS — Z3A.16 16 WEEKS GESTATION OF PREGNANCY: ICD-10-CM

## 2022-09-21 DIAGNOSIS — O09.292 HISTORY OF PRE-ECLAMPSIA IN PRIOR PREGNANCY, CURRENTLY PREGNANT IN SECOND TRIMESTER: ICD-10-CM

## 2022-09-21 DIAGNOSIS — O09.212 CURRENT PREGNANCY WITH HISTORY OF PRE-TERM LABOR IN SECOND TRIMESTER: Primary | ICD-10-CM

## 2022-09-21 DIAGNOSIS — Z13.89 ENCOUNTER FOR ROUTINE SCREENING FOR MALFORMATION USING ULTRASONICS: ICD-10-CM

## 2022-09-21 DIAGNOSIS — O99.212 OBESITY AFFECTING PREGNANCY IN SECOND TRIMESTER: ICD-10-CM

## 2022-09-21 LAB
ABDOMINAL CIRCUMFERENCE: NORMAL
ABDOMINAL CIRCUMFERENCE: NORMAL
BIPARIETAL DIAMETER: NORMAL
BIPARIETAL DIAMETER: NORMAL
ESTIMATED FETAL WEIGHT: NORMAL
ESTIMATED FETAL WEIGHT: NORMAL
FEMORAL DIAMETER: NORMAL
FEMORAL DIAMETER: NORMAL
HC/AC: NORMAL
HC/AC: NORMAL
HEAD CIRCUMFERENCE: NORMAL
HEAD CIRCUMFERENCE: NORMAL

## 2022-09-21 PROCEDURE — 76817 TRANSVAGINAL US OBSTETRIC: CPT | Performed by: OBSTETRICS & GYNECOLOGY

## 2022-09-21 PROCEDURE — 99999 PR OFFICE/OUTPT VISIT,PROCEDURE ONLY: CPT | Performed by: OBSTETRICS & GYNECOLOGY

## 2022-09-21 PROCEDURE — 96372 THER/PROPH/DIAG INJ SC/IM: CPT | Performed by: OBSTETRICS & GYNECOLOGY

## 2022-09-21 RX ORDER — HYDROXYPROGESTERONE CAPROATE 250 MG/ML
250 INJECTION INTRAMUSCULAR ONCE
Status: COMPLETED | OUTPATIENT
Start: 2022-09-21 | End: 2022-09-21

## 2022-09-21 RX ADMIN — HYDROXYPROGESTERONE CAPROATE 250 MG: 250 INJECTION INTRAMUSCULAR at 16:31

## 2022-09-23 ENCOUNTER — OFFICE VISIT (OUTPATIENT)
Dept: OBGYN CLINIC | Age: 32
End: 2022-09-23
Payer: MEDICARE

## 2022-09-23 ENCOUNTER — HOSPITAL ENCOUNTER (OUTPATIENT)
Age: 32
Setting detail: SPECIMEN
Discharge: HOME | End: 2022-09-23

## 2022-09-23 VITALS
BODY MASS INDEX: 32.94 KG/M2 | HEIGHT: 62 IN | SYSTOLIC BLOOD PRESSURE: 108 MMHG | WEIGHT: 179 LBS | DIASTOLIC BLOOD PRESSURE: 74 MMHG

## 2022-09-23 DIAGNOSIS — N89.8 VAGINAL DISCHARGE: ICD-10-CM

## 2022-09-23 DIAGNOSIS — Z3A.17 17 WEEKS GESTATION OF PREGNANCY: Primary | ICD-10-CM

## 2022-09-23 PROCEDURE — G8427 DOCREV CUR MEDS BY ELIG CLIN: HCPCS | Performed by: OBSTETRICS & GYNECOLOGY

## 2022-09-23 PROCEDURE — 1036F TOBACCO NON-USER: CPT | Performed by: OBSTETRICS & GYNECOLOGY

## 2022-09-23 PROCEDURE — G8417 CALC BMI ABV UP PARAM F/U: HCPCS | Performed by: OBSTETRICS & GYNECOLOGY

## 2022-09-23 PROCEDURE — 99213 OFFICE O/P EST LOW 20 MIN: CPT | Performed by: OBSTETRICS & GYNECOLOGY

## 2022-09-23 RX ORDER — METRONIDAZOLE 7.5 MG/G
GEL VAGINAL
Qty: 1 EACH | Refills: 0 | Status: SHIPPED | OUTPATIENT
Start: 2022-09-23 | End: 2022-09-30
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
## 2022-09-24 LAB
CANDIDA SPECIES, DNA PROBE: NEGATIVE
GARDNERELLA VAGINALIS, DNA PROBE: NEGATIVE
SOURCE: NORMAL
TRICHOMONAS VAGINALIS DNA: NEGATIVE

## 2022-09-26 LAB
C TRACH DNA GENITAL QL NAA+PROBE: NEGATIVE
N. GONORRHOEAE DNA: NEGATIVE
SPECIMEN DESCRIPTION: NORMAL

## 2022-09-28 ENCOUNTER — TELEPHONE (OUTPATIENT)
Dept: OBGYN CLINIC | Age: 32
End: 2022-09-28

## 2022-09-28 DIAGNOSIS — O23.40 URINARY TRACT INFECTION IN MOTHER DURING PREGNANCY, ANTEPARTUM: Primary | ICD-10-CM

## 2022-09-28 NOTE — TELEPHONE ENCOUNTER
----- Message from Dianne Gibbs DO sent at 9/27/2022  3:53 PM EDT -----  Pelvic cultures negative. Please follow up with patient to see if her symptoms have improved at all from her appt on 9/23. Thank you.

## 2022-09-30 NOTE — TELEPHONE ENCOUNTER
Please schedule ultrasound asap to check fluid and viability. Can order her a urine culture to assure not UTI.

## 2022-10-05 ENCOUNTER — ROUTINE PRENATAL (OUTPATIENT)
Dept: PERINATAL CARE | Age: 32
End: 2022-10-05
Payer: MEDICARE

## 2022-10-05 VITALS
RESPIRATION RATE: 16 BRPM | TEMPERATURE: 97.4 F | SYSTOLIC BLOOD PRESSURE: 114 MMHG | HEART RATE: 104 BPM | BODY MASS INDEX: 32.94 KG/M2 | HEIGHT: 62 IN | WEIGHT: 179 LBS | DIASTOLIC BLOOD PRESSURE: 58 MMHG

## 2022-10-05 DIAGNOSIS — O09.212 CURRENT PREGNANCY WITH HISTORY OF PRE-TERM LABOR IN SECOND TRIMESTER: Primary | ICD-10-CM

## 2022-10-05 DIAGNOSIS — O43.102 PLACENTAL ABNORMALITY IN SECOND TRIMESTER: ICD-10-CM

## 2022-10-05 DIAGNOSIS — Z3A.18 18 WEEKS GESTATION OF PREGNANCY: ICD-10-CM

## 2022-10-05 LAB
ABDOMINAL CIRCUMFERENCE: NORMAL
BIPARIETAL DIAMETER: NORMAL
ESTIMATED FETAL WEIGHT: NORMAL
FEMORAL DIAMETER: NORMAL
HC/AC: NORMAL
HEAD CIRCUMFERENCE: NORMAL

## 2022-10-05 PROCEDURE — 76817 TRANSVAGINAL US OBSTETRIC: CPT | Performed by: OBSTETRICS & GYNECOLOGY

## 2022-10-05 PROCEDURE — 99999 PR OFFICE/OUTPT VISIT,PROCEDURE ONLY: CPT | Performed by: OBSTETRICS & GYNECOLOGY

## 2022-10-05 PROCEDURE — 76815 OB US LIMITED FETUS(S): CPT | Performed by: OBSTETRICS & GYNECOLOGY

## 2022-10-05 RX ORDER — PROGESTERONE 200 MG/1
CAPSULE ORAL
COMMUNITY
Start: 2022-09-20 | End: 2022-10-19 | Stop reason: CLARIF

## 2022-10-05 RX ORDER — HYDROXYPROGESTERONE CAPROATE 250 MG/ML
INJECTION INTRAMUSCULAR
COMMUNITY
Start: 2022-09-19

## 2022-10-10 ENCOUNTER — HOSPITAL ENCOUNTER (OUTPATIENT)
Age: 32
Setting detail: SPECIMEN
Discharge: HOME | End: 2022-10-10

## 2022-10-10 ENCOUNTER — NURSE ONLY (OUTPATIENT)
Dept: OBGYN CLINIC | Age: 32
End: 2022-10-10

## 2022-10-10 ENCOUNTER — TELEPHONE (OUTPATIENT)
Dept: OBGYN CLINIC | Age: 32
End: 2022-10-10

## 2022-10-10 DIAGNOSIS — O23.40 URINARY TRACT INFECTION IN MOTHER DURING PREGNANCY, ANTEPARTUM: Primary | ICD-10-CM

## 2022-10-10 DIAGNOSIS — R35.0 URINARY FREQUENCY: ICD-10-CM

## 2022-10-10 NOTE — TELEPHONE ENCOUNTER
Pt called wanting to come in and leave a urine for a UTI. She has had frequent urination, and strong odor for the last 2 weeks. She was tested for BV the last time she was here, and it was negative. She left a urine at [facility] last week, but they let her know they do not send out for urine cultures.

## 2022-10-11 LAB
CULTURE: NORMAL
SPECIMEN DESCRIPTION: NORMAL

## 2022-10-12 NOTE — TELEPHONE ENCOUNTER
I left message for patient- I am out the rest of the week.  FYI If she calls back she will need an appointment if she is still having symptoms

## 2022-10-12 NOTE — TELEPHONE ENCOUNTER
Please let pt know urine culture negative.   If still having symptoms or abnormal vaginal odor/d/c may need to come in for pelvic cultures'

## 2022-10-19 ENCOUNTER — ROUTINE PRENATAL (OUTPATIENT)
Dept: PERINATAL CARE | Age: 32
End: 2022-10-19
Payer: MEDICARE

## 2022-10-19 VITALS
SYSTOLIC BLOOD PRESSURE: 108 MMHG | HEIGHT: 62 IN | DIASTOLIC BLOOD PRESSURE: 63 MMHG | BODY MASS INDEX: 33.49 KG/M2 | WEIGHT: 182 LBS | HEART RATE: 108 BPM | RESPIRATION RATE: 16 BRPM | TEMPERATURE: 98.2 F

## 2022-10-19 DIAGNOSIS — O99.212 OBESITY AFFECTING PREGNANCY IN SECOND TRIMESTER: ICD-10-CM

## 2022-10-19 DIAGNOSIS — O43.102 PLACENTAL ABNORMALITY IN SECOND TRIMESTER: Primary | ICD-10-CM

## 2022-10-19 DIAGNOSIS — O09.212 CURRENT PREGNANCY WITH HISTORY OF PRE-TERM LABOR IN SECOND TRIMESTER: ICD-10-CM

## 2022-10-19 DIAGNOSIS — Z3A.20 20 WEEKS GESTATION OF PREGNANCY: ICD-10-CM

## 2022-10-19 DIAGNOSIS — O09.292 HISTORY OF PRE-ECLAMPSIA IN PRIOR PREGNANCY, CURRENTLY PREGNANT IN SECOND TRIMESTER: ICD-10-CM

## 2022-10-19 PROCEDURE — 76817 TRANSVAGINAL US OBSTETRIC: CPT | Performed by: OBSTETRICS & GYNECOLOGY

## 2022-10-19 PROCEDURE — 76811 OB US DETAILED SNGL FETUS: CPT | Performed by: OBSTETRICS & GYNECOLOGY

## 2022-10-19 PROCEDURE — 99999 PR OFFICE/OUTPT VISIT,PROCEDURE ONLY: CPT | Performed by: OBSTETRICS & GYNECOLOGY

## 2022-11-17 ENCOUNTER — ROUTINE PRENATAL (OUTPATIENT)
Dept: PERINATAL CARE | Age: 32
End: 2022-11-17
Payer: MEDICARE

## 2022-11-17 VITALS
DIASTOLIC BLOOD PRESSURE: 64 MMHG | HEART RATE: 106 BPM | WEIGHT: 180.34 LBS | BODY MASS INDEX: 33.19 KG/M2 | RESPIRATION RATE: 16 BRPM | HEIGHT: 62 IN | SYSTOLIC BLOOD PRESSURE: 109 MMHG | TEMPERATURE: 97.8 F

## 2022-11-17 DIAGNOSIS — O09.292 HISTORY OF PRE-ECLAMPSIA IN PRIOR PREGNANCY, CURRENTLY PREGNANT IN SECOND TRIMESTER: ICD-10-CM

## 2022-11-17 DIAGNOSIS — Z36.4 ULTRASOUND FOR ANTENATAL SCREENING FOR FETAL GROWTH RESTRICTION: ICD-10-CM

## 2022-11-17 DIAGNOSIS — Z3A.25 25 WEEKS GESTATION OF PREGNANCY: ICD-10-CM

## 2022-11-17 DIAGNOSIS — O09.212 CURRENT PREGNANCY WITH HISTORY OF PRE-TERM LABOR IN SECOND TRIMESTER: Primary | ICD-10-CM

## 2022-11-17 DIAGNOSIS — O99.212 OBESITY AFFECTING PREGNANCY IN SECOND TRIMESTER: ICD-10-CM

## 2022-11-17 DIAGNOSIS — O43.102 PLACENTAL ABNORMALITY IN SECOND TRIMESTER: ICD-10-CM

## 2022-11-17 PROCEDURE — 76816 OB US FOLLOW-UP PER FETUS: CPT | Performed by: OBSTETRICS & GYNECOLOGY

## 2022-11-17 PROCEDURE — 76817 TRANSVAGINAL US OBSTETRIC: CPT | Performed by: OBSTETRICS & GYNECOLOGY

## 2022-11-17 PROCEDURE — 99999 PR OFFICE/OUTPT VISIT,PROCEDURE ONLY: CPT | Performed by: OBSTETRICS & GYNECOLOGY

## 2022-12-01 ENCOUNTER — HOSPITAL ENCOUNTER (OUTPATIENT)
Age: 32
Discharge: HOME | End: 2022-12-01
Payer: MEDICARE

## 2022-12-01 ENCOUNTER — ROUTINE PRENATAL (OUTPATIENT)
Dept: PERINATAL CARE | Age: 32
End: 2022-12-01
Payer: MEDICARE

## 2022-12-01 VITALS
HEART RATE: 112 BPM | DIASTOLIC BLOOD PRESSURE: 69 MMHG | BODY MASS INDEX: 33.43 KG/M2 | HEIGHT: 62 IN | TEMPERATURE: 97.7 F | RESPIRATION RATE: 16 BRPM | WEIGHT: 181.66 LBS | SYSTOLIC BLOOD PRESSURE: 121 MMHG

## 2022-12-01 DIAGNOSIS — O09.213 CURRENT PREGNANCY WITH HISTORY OF PRE-TERM LABOR IN THIRD TRIMESTER: Primary | ICD-10-CM

## 2022-12-01 DIAGNOSIS — O99.213 OBESITY AFFECTING PREGNANCY IN THIRD TRIMESTER: ICD-10-CM

## 2022-12-01 DIAGNOSIS — O43.103 PLACENTAL ABNORMALITY IN THIRD TRIMESTER: ICD-10-CM

## 2022-12-01 DIAGNOSIS — Z3A.27 27 WEEKS GESTATION OF PREGNANCY: ICD-10-CM

## 2022-12-01 DIAGNOSIS — R79.89 LOW TSH LEVEL: ICD-10-CM

## 2022-12-01 DIAGNOSIS — O09.293 HISTORY OF PRE-ECLAMPSIA IN PRIOR PREGNANCY, CURRENTLY PREGNANT, THIRD TRIMESTER: ICD-10-CM

## 2022-12-01 LAB
ABDOMINAL CIRCUMFERENCE: NORMAL
ABDOMINAL CIRCUMFERENCE: NORMAL
BIPARIETAL DIAMETER: NORMAL
BIPARIETAL DIAMETER: NORMAL
ESTIMATED FETAL WEIGHT: NORMAL
ESTIMATED FETAL WEIGHT: NORMAL
FEMORAL DIAMETER: NORMAL
FEMORAL DIAMETER: NORMAL
HC/AC: NORMAL
HC/AC: NORMAL
HEAD CIRCUMFERENCE: NORMAL
HEAD CIRCUMFERENCE: NORMAL
SEND OUT REPORT: NORMAL
TEST NAME: NORMAL
TSH SERPL DL<=0.05 MIU/L-ACNC: <0.01 UIU/ML (ref 0.3–5)

## 2022-12-01 PROCEDURE — 84439 ASSAY OF FREE THYROXINE: CPT

## 2022-12-01 PROCEDURE — 84443 ASSAY THYROID STIM HORMONE: CPT

## 2022-12-01 PROCEDURE — 99999 PR OFFICE/OUTPT VISIT,PROCEDURE ONLY: CPT | Performed by: OBSTETRICS & GYNECOLOGY

## 2022-12-01 PROCEDURE — 76819 FETAL BIOPHYS PROFIL W/O NST: CPT | Performed by: OBSTETRICS & GYNECOLOGY

## 2022-12-01 PROCEDURE — 76817 TRANSVAGINAL US OBSTETRIC: CPT | Performed by: OBSTETRICS & GYNECOLOGY

## 2022-12-01 PROCEDURE — 36415 COLL VENOUS BLD VENIPUNCTURE: CPT

## 2022-12-02 LAB — THYROXINE, FREE: 1.8 NG/DL (ref 0.93–1.7)

## 2022-12-08 ENCOUNTER — TELEPHONE (OUTPATIENT)
Dept: OBGYN CLINIC | Age: 32
End: 2022-12-08

## 2022-12-08 NOTE — TELEPHONE ENCOUNTER
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 I did tell her we can give her whatever restrictions that her job is requesting but she stated that she left work last night because they gave her a hard time about using the bathroom so frequently. I did state we don't write off unless it is a medical indication, and we can write restrictions for whatever is needed. She has not seen us since 9/23, has been seeing MFM and appts here are too much. She is supposed to see us next Friday.

## 2022-12-09 ENCOUNTER — HOSPITAL ENCOUNTER (OUTPATIENT)
Age: 32
Setting detail: SPECIMEN
Discharge: HOME | End: 2022-12-09
Payer: MEDICARE

## 2022-12-09 ENCOUNTER — TELEPHONE (OUTPATIENT)
Dept: OBGYN CLINIC | Age: 32
End: 2022-12-09

## 2022-12-09 DIAGNOSIS — R35.0 FREQUENCY OF URINATION: ICD-10-CM

## 2022-12-09 DIAGNOSIS — R39.15 URGENCY OF URINATION: ICD-10-CM

## 2022-12-09 DIAGNOSIS — R39.15 URGENCY OF URINATION: Primary | ICD-10-CM

## 2022-12-09 PROCEDURE — 87086 URINE CULTURE/COLONY COUNT: CPT

## 2022-12-09 NOTE — TELEPHONE ENCOUNTER
Pt called in with c/o increased urgency and frequency this week as she feel like she has to use the restroom a lot-     Did inform dr borja who stated to order urine culture on patient- if she starts with fever, chills, kidney pain or low/upper back pain, contractions, decreased fetal movements or she thinks her water broke then she needs to report to [facility] for evaluation-     Did leave all detailed message on vm

## 2022-12-10 LAB
CULTURE: NORMAL
SPECIMEN DESCRIPTION: NORMAL

## 2022-12-12 NOTE — TELEPHONE ENCOUNTER
I would just write a letter stating that she is high risk for the reasons listed in [facility] ultrasound report. I would write that without strict restrictions there is a potential that her job duties could negatively impact her pregnancy and therefore we are recommended she take leave from work to optimize pregnancy. Something like that. That's all we can do. We can't make anything up or whatever. I mean short of fraud or falsifying documentation we can provide her with whatever she needs. Thanks.

## 2022-12-13 ENCOUNTER — ROUTINE PRENATAL (OUTPATIENT)
Dept: OBGYN CLINIC | Age: 32
End: 2022-12-13
Payer: MEDICARE

## 2022-12-13 VITALS — BODY MASS INDEX: 33.47 KG/M2 | DIASTOLIC BLOOD PRESSURE: 72 MMHG | SYSTOLIC BLOOD PRESSURE: 120 MMHG | WEIGHT: 183 LBS

## 2022-12-13 DIAGNOSIS — R79.89 LOW TSH LEVEL: ICD-10-CM

## 2022-12-13 DIAGNOSIS — O09.293 HISTORY OF PRE-ECLAMPSIA IN PRIOR PREGNANCY, CURRENTLY PREGNANT, THIRD TRIMESTER: ICD-10-CM

## 2022-12-13 DIAGNOSIS — Z3A.28 28 WEEKS GESTATION OF PREGNANCY: ICD-10-CM

## 2022-12-13 DIAGNOSIS — O99.210 OBESITY AFFECTING PREGNANCY, ANTEPARTUM: ICD-10-CM

## 2022-12-13 DIAGNOSIS — O09.213 CURRENT PREGNANCY WITH HISTORY OF PRE-TERM LABOR IN THIRD TRIMESTER: ICD-10-CM

## 2022-12-13 DIAGNOSIS — O09.93 HIGH-RISK PREGNANCY IN THIRD TRIMESTER: Primary | ICD-10-CM

## 2022-12-13 PROCEDURE — G8484 FLU IMMUNIZE NO ADMIN: HCPCS | Performed by: NURSE PRACTITIONER

## 2022-12-13 PROCEDURE — G8427 DOCREV CUR MEDS BY ELIG CLIN: HCPCS | Performed by: NURSE PRACTITIONER

## 2022-12-13 PROCEDURE — 1036F TOBACCO NON-USER: CPT | Performed by: NURSE PRACTITIONER

## 2022-12-13 PROCEDURE — 99214 OFFICE O/P EST MOD 30 MIN: CPT | Performed by: NURSE PRACTITIONER

## 2022-12-13 PROCEDURE — G8417 CALC BMI ABV UP PARAM F/U: HCPCS | Performed by: NURSE PRACTITIONER
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
## 2022-12-13 NOTE — PATIENT INSTRUCTIONS
After Hours Number: You can call the office [phone]  or [phone]  Call if you have:  1. Bleeding like a period  2. Cramps or contractions greater than 2 hours  3. If you are leaking fluid  4. If you've a fever greater than 100°  5. If you feel as if baby is not moving  6. If you have continuous vomiting over 3-4 hours   Counting Your Baby's Kicks: Care Instructions  Your Care Instructions    Counting your baby's kicks is one way your doctor can tell that your baby is healthy. Most women--especially in a first pregnancy--feel their baby move for the first time between 16 and 22 weeks. The movement may feel like flutters rather than kicks. Your baby may move more at certain times of the day. When you are active, you may notice less kicking than when you are resting. At your prenatal visits, your doctor will ask whether the baby is active. In your last trimester, your doctor may ask you to count the number of times you feel your baby move. Follow-up care is a key part of your treatment and safety. Be sure to make and go to all appointments, and call your doctor if you are having problems. It's also a good idea to know your test results and keep a list of the medicines you take. How do you count fetal kicks? A common method of checking your baby's movement is to count the number of kicks or moves you feel in 1 hour. Ten movements (such as kicks, flutters, or rolls) in 1 hour are normal. Some doctors suggest that you count in the morning until you get to 10 movements. Then you can quit for that day and start again the next day. Pick your baby's most active time of day to count. This may be any time from morning to evening. If you do not feel 10 movements in an hour, your baby may be sleeping. Wait for the next hour and count again. When should you call for help?   Call your doctor now or seek immediate medical care if:    You noticed that your baby has stopped moving or is moving much less than normal.    Watch closely for changes in your health, and be sure to contact your doctor if you have any problems. Where can you learn more? Go to https://chpepiceweb.HEROZ. org and sign in to your orderTopia account. Enter U291 in the KidsLink box to learn more about \"Counting Your Baby's Kicks: Care Instructions. \"     If you do not have an account, please click on the \"Sign Up Now\" link. Current as of: September 5, 2018  Content Version: 12.0  © 2544-6112 freshbag. Care instructions adapted under license by [facility] ([facility]). If you have questions about a medical condition or this instruction, always ask your healthcare professional. [address] any warranty or liability for your use of this information. Preeclampsia: Care Instructions  Overview    Preeclampsia occurs when a woman's blood pressure rises during pregnancy. Often with preeclampsia, you also have swelling in your legs, hands, and face. A test may show too much protein in your urine. Preeclampsia is also called toxemia. If preeclampsia is severe and not treated, it can lead to seizures (eclampsia) and damage to your liver or kidneys. Preeclampsia can prevent your baby from getting enough food and oxygen. This can cause a low birth weight or other problems. Your doctor will watch you closely to prevent these problems. He or she also may recommend that you reduce your activity. If your preeclampsia is a danger to your health or the health of your baby, your doctor may need to deliver your baby early. While preeclampsia is a concern, most women with preeclampsia have healthy babies. After a woman gives birth, preeclampsia usually goes away on its own. But symptoms may last a few weeks or more and can get worse after delivery. Rarely, symptoms of preeclampsia don't show up until days or even weeks after childbirth. Follow-up care is a key part of your treatment and safety.  Be sure to make and go to all appointments, and call your doctor if you are having problems. It's also a good idea to know your test results and keep a list of the medicines you take. How can you care for yourself at home? Take and record your blood pressure at home if your doctor tells you to. Learn the importance of the two measures of blood pressure (such as 120 over 80, or 120/80). The first number is the systolic pressure, which is the force of blood on the artery walls as the heart pumps. The second number is the diastolic pressure, which is the force of blood on the artery walls between heartbeats, when the heart is at rest. You have a choice of monitors to use. Manual monitor: You pump up the cuff and use a stethoscope to listen for your pulse. Electronic monitor: The cuff inflates, and a gauge shows your pulse rate. To take your blood pressure:  Ask your doctor to check your blood pressure monitor to be sure that it is accurate and that the cuff fits you. Also ask your doctor to watch you to make sure that you are using it right. You should not eat, use tobacco products, or use medicine known to raise blood pressure (such as some nasal decongestant sprays) before you take your blood pressure. Avoid taking your blood pressure if you have just exercised. Also avoid taking it if you are nervous or upset. Rest at least 15 minutes before you take your blood pressure. If your doctor advises, check the protein levels in your urine. Your doctor or nurse will show you how to do this. Take your medicines exactly as prescribed. Call your doctor if you think you are having a problem with your medicine. Do not smoke. Quitting smoking will help improve your baby's growth and health. If you need help quitting, talk to your doctor about stop-smoking programs and medicines. These can increase your chances of quitting for good. Eat a balanced and healthy diet that has lots of fruits and vegetables.   If your doctor advised bed rest, be sure to stay off your feet and rest as much as possible. Keep a phone, phone book, notepad, and pen near the bed where you can easily reach them. Gently stretch your legs every hour to maintain good blood flow. Have another family member pack snacks and lunch food in a cooler close to your bed. Use this time for activities that you usually cannot find time for, such as reading, craft projects, or letter writing. You can keep track of your baby's health by noting the length of time it takes to count 10 movements (such as kicks, flutters, or rolls). Feeling 10 movements in less than 1 hour is considered normal. Track your baby's movements once each day. Bring this record with you to each prenatal visit. When should you call for help? Call 911 anytime you think you may need emergency care. For example, call if:    You passed out (lost consciousness). You have a seizure. Call your doctor now or seek immediate medical care if:    You have symptoms of preeclampsia, such as:  Sudden swelling of your face, hands, or feet. New vision problems (such as dimness or blurring). A severe headache. Your blood pressure is higher than it should be, or it rises suddenly. You have new nausea or vomiting. You think that you are in labor. You have pain in your belly or pelvis. Watch closely for changes in your health, and be sure to contact your doctor if:    You gain weight rapidly. Where can you learn more? Go to https://ActimizepeStarline.Clonect Solutions. org and sign in to your [employer] account. Enter C588 in the [facility] box to learn more about \"Preeclampsia: Care Instructions. \"     If you do not have an account, please click on the \"Sign Up Now\" link. Current as of: September 5, 2018  Content Version: 12.0  © 1870-9718 Healthwise, Incorporated. Care instructions adapted under license by [facility] ([facility]).  If you have questions about a medical condition or this instruction, always ask your healthcare professional. [place] [address] any warranty or liability for your use of this information.  Labor: Care Instructions  Your Care Instructions     labor is the start of labor between 21 and 36 weeks of pregnancy. A full-term pregnancy lasts 37 to 42 weeks. In labor, the uterus contracts to open the cervix. This is the first stage of childbirth.  labor can be caused by a problem with the baby, the mother, or both. Often the cause is not known. In some cases, doctors use medicines to try to delay labor until 29 or more weeks of pregnancy. By this time, a baby has grown enough so that problems are not likely. In some cases--such as with a serious infection--it is healthier for the baby to be born early. Your treatment will depend on how far along you are in your pregnancy and on your health and your baby's health. Follow-up care is a key part of your treatment and safety. Be sure to make and go to all appointments, and call your doctor if you are having problems. It's also a good idea to know your test results and keep a list of the medicines you take. How can you care for yourself at home? If your doctor prescribed medicines, take them exactly as directed. Call your doctor if you think you are having a problem with your medicine. Rest until your doctor advises you about activity. He or she will tell you if you should stay in bed most of the time. You may need to arrange for  if you have young children. Do not have sexual intercourse unless your doctor says it is safe. Use pads, not tampons, if you have vaginal bleeding. Make sure to drink plenty of fluids. Dehydration can lead to contractions. If you have kidney, heart, or liver disease and have to limit fluids, talk with your doctor before you increase the amount of fluids you drink. Do not smoke or allow others to smoke around you.  If you need help quitting, talk to your doctor about stop-smoking programs and medicines. These can increase your chances of quitting for good. When should you call for help? Call 911 anytime you think you may need emergency care. For example, call if:    You passed out (lost consciousness). You have severe vaginal bleeding. You have severe pain in your belly or pelvis. You have had fluid gushing or leaking from your vagina and you know or think the umbilical cord is bulging into your vagina. If this happens, immediately get down on your knees so your rear end (buttocks) is higher than your head. This will decrease the pressure on the cord until help arrives. Call your doctor now or seek immediate medical care if:    You have signs of preeclampsia, such as:  Sudden swelling of your face, hands, or feet. New vision problems (such as dimness or blurring). A severe headache. You have any vaginal bleeding. You have belly pain or cramping. You have a fever. You have had regular contractions (with or without pain) for an hour. This means that you have 6 or more within 1 hour after you change your position and drink fluids. You have a sudden release of fluid from the vagina. You have low back pain or pelvic pressure that does not go away. You notice that your baby has stopped moving or is moving much less than normal.    Watch closely for changes in your health, and be sure to contact your doctor if you have any problems. Where can you learn more? Go to https://Work4arnoldoeb.depict. org and sign in to your [employer] account. Enter Q400 in the [facility] box to learn more about \" Labor: Care Instructions. \"     If you do not have an account, please click on the \"Sign Up Now\" link. Current as of: 2018  Content Version: 12.0  © 1288-1680 Healthwise, [employer]. Care instructions adapted under license by [facility] ([facility]).  If you have questions about a medical condition or this instruction, always ask your healthcare professional. [place] [address] any warranty or liability for your use of this information.

## 2022-12-19 ENCOUNTER — TELEPHONE (OUTPATIENT)
Dept: OBGYN | Age: 32
End: 2022-12-19

## 2022-12-19 NOTE — TELEPHONE ENCOUNTER
Patient called stating she would like to establish care with our office. Patient is currently 29w4d.
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
## 2022-12-20 NOTE — TELEPHONE ENCOUNTER
RN called patient to discuss patient's transfer of care. Patient states she is unhappy with Dr. Frederick Campuzano and will not return to his office for care. Patient was advised [facility] can assume care of her in this pregnancy but that she must continue to contact Dr. Kalen Tejada for any concerns until she can be scheduled in this office. Patient verbalized understanding. MA will call the patient Wednesday to schedule new OB patient. Patient amenable.

## 2022-12-22 ENCOUNTER — ROUTINE PRENATAL (OUTPATIENT)
Dept: OBGYN | Age: 32
End: 2022-12-22
Payer: MEDICARE

## 2022-12-22 ENCOUNTER — TELEPHONE (OUTPATIENT)
Dept: OBGYN | Age: 32
End: 2022-12-22

## 2022-12-22 VITALS
HEART RATE: 97 BPM | WEIGHT: 181 LBS | SYSTOLIC BLOOD PRESSURE: 123 MMHG | DIASTOLIC BLOOD PRESSURE: 76 MMHG | BODY MASS INDEX: 33.11 KG/M2

## 2022-12-22 DIAGNOSIS — Z23 INFLUENZA VACCINE ADMINISTERED: ICD-10-CM

## 2022-12-22 DIAGNOSIS — E05.90 HYPERTHYROIDISM AFFECTING PREGNANCY IN THIRD TRIMESTER: ICD-10-CM

## 2022-12-22 DIAGNOSIS — R79.89 LOW TSH LEVEL: ICD-10-CM

## 2022-12-22 DIAGNOSIS — Z3A.30 30 WEEKS GESTATION OF PREGNANCY: Primary | ICD-10-CM

## 2022-12-22 DIAGNOSIS — O99.283 HYPERTHYROIDISM AFFECTING PREGNANCY IN THIRD TRIMESTER: ICD-10-CM

## 2022-12-22 DIAGNOSIS — O09.899 HISTORY OF PRETERM DELIVERY, CURRENTLY PREGNANT: ICD-10-CM

## 2022-12-22 PROCEDURE — 90715 TDAP VACCINE 7 YRS/> IM: CPT | Performed by: OBSTETRICS & GYNECOLOGY

## 2022-12-22 PROCEDURE — 99211 OFF/OP EST MAY X REQ PHY/QHP: CPT | Performed by: STUDENT IN AN ORGANIZED HEALTH CARE EDUCATION/TRAINING PROGRAM

## 2022-12-22 PROCEDURE — G0008 ADMIN INFLUENZA VIRUS VAC: HCPCS | Performed by: OBSTETRICS & GYNECOLOGY

## 2022-12-22 NOTE — PROGRESS NOTES
Pt is unable to void at the time of rooming    Patient was given TDAP in the Right Deltoid. [facility] [id number]  LOT#   Exp date- 11/3/2024  Patient tolerated well without difficulty      Patient was given influenza vaccine in the Left Deltoid.  Per doctor [address]# [id number]  LOT# [id number]  Exp date- 06/30/2023  Patient tolerated well without difficulty

## 2022-12-22 NOTE — TELEPHONE ENCOUNTER
RN called [facility] to change provider for patient's current [facility] home based injection of 17P. Sherin Peter will initiate change of provider to Dr. Bebo Pickering and fax paperwork next week (12/27/22) to then be signed and returned. This will not disrupt the patient's injection services but will ensure Lulu Jay receives all updates.

## 2022-12-22 NOTE — PROGRESS NOTES
Prenatal Visit    Refugio Mark is a 27 y.o. female [id number] at 30w0d    The patient was seen and evaluated. She complains of urinary frequency which she said was worse when she was still working and having pelvic pressures. It has improved since being off work but still has frequency. States it has been the same throughout the pregnancy and two negative urine cultures. She declined repeat urine culture. There was positive fetal movements. She denies contractions, vaginal bleeding and leakage of fluid. Signs and symptoms of  labor as well as labor were reviewed. The S/S of Pre-Eclampsia were reviewed with the patient in detail. She is to report any of these if they occur. She currently denies any of these. Discussed Tdap and Influenza vaccine recommendations. The problem list reflects the active issues addressed during today's visit    Vitals:  BP: 123/76  Weight: 181 lb (82.1 kg)  Heart Rate: 97  Fundal Height (cm): 31 cm  Fetal HR: 135  Movement: Present     28 week labs: .  1hr GTT: ordered   28 week CBC:   Lab Results   Component Value Date    WBC 13.3 (H) 2022    HGB 13.3 2022    HCT 41.8 2022    MCV 88.7 2022     2022     UA w/ Ur C&S: negative      Assessment & Plan:  Refugio Mark is a 27 y.o. female [id number] at 30w0d   - 28 week labs ordered   - discussed recommendations for TDAP immunization, patient requested TDAP. - Influenza vaccination: discussed and patient requested  - COVID-19 vaccination: R/B/A discussed with patient including significant increase in risk of maternal and fetal morbidity and mortality. Enouraged vaccination.   - Rhogam: not indicated    - Progesterone injections already approved and receiving   - Following with MFM for prior PTDs   - New referral for consult for overt hyperthyroidism sent   - Repeat labs ordered   - Hx postpartum PreE. Patient was prescribed aspirin but hasn't been taking it.  Counseled on signs/symptoms   -  testing indication starting 32 weeks GA: not indicated   - Warning signs reviewed and recommendations when to call or present to the hospital if she experiences signs or symptoms of  labor and pre-eclampsia were reviewed. Patient Active Problem List    Diagnosis Date Noted    Hyperthyroid (Overt) 2022     Priority: Medium     Repeat labs ordered 22  Consult referal to [facility] sent      Hx of PreE without SF 2022     G3 postpartum        18 M Apg 8/9 Wt 5#4 2019     Spontaneous  delivery @ 34w4d      Obesity affecting pregnancy in first trimester 2019     ASA 81 mg/d      History of  delivery, currently pregnant 2019    Uterine fibroids 10/31/2017    H/O sPTD x3 (G1 @ 36wks; G2 @ 34wks; G3 @ 34wks) 2017     U/S q 2 weeks from 16-34 weeks, [place] until 36.6 weeks. 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 Had negative culture. Reports she has never had a genital lesion in the past.       ASCUS (atypical squamous cells of undetermined significance) on Pap smear     Depression 2010     prior to del 2010       Return in about 2 weeks (around 2023) for routine ob. The patient was counseled on signs and symptoms of  labor and recommended to go to the hospital if any of the signs are experienced. The patient was counseled on Labor & Delivery. Route of delivery and counseling on vaginal, operative vaginal, and  sections were completed with the risks of each to both the patient as well as her baby. The patient was counseled on fetal kick counts. The patient was instructed on fetal kick counts.  She was instructed that the baby should move at a minimum of ten times within one hour after a meal. The patient was instructed to lay down on her left side twenty minutes after eating and count movements for up to one hour with a target value of ten movements. She was instructed to notify the office if she did not make that target after two attempts or if after any attempt there was less than four movements. The patient reports that the targets have been made. The patient was counseled on the mandatory call ahead policy. She has been instructed to call the office at anytime prior to going into the hospital. Exceptions were reviewed including but not limited to: decreased fetal movement, vaginal bleeding or hemorrhage, trauma, readily expectant delivery, or any instance where she feels 911 should be utilized. Abdirahman Wright DO  Ob/Gyn Resident  [facility], [address]  12/22/2022, 2:56 PM        Attending Physician Statement  I have discussed the care of Alyssa Cornejo, including pertinent history and exam findings,  with the resident. I have reviewed the key elements of all parts of the encounter with the resident. I agree with the assessment, plan and orders as documented by the resident.   (GE Modifier)    Kyara Mcnair DO

## 2022-12-29 ENCOUNTER — ROUTINE PRENATAL (OUTPATIENT)
Dept: PERINATAL CARE | Age: 32
End: 2022-12-29
Payer: MEDICARE

## 2022-12-29 VITALS
RESPIRATION RATE: 16 BRPM | SYSTOLIC BLOOD PRESSURE: 106 MMHG | TEMPERATURE: 98.6 F | BODY MASS INDEX: 33.11 KG/M2 | WEIGHT: 181 LBS | DIASTOLIC BLOOD PRESSURE: 64 MMHG | HEART RATE: 102 BPM

## 2022-12-29 DIAGNOSIS — Z36.89 ENCOUNTER FOR ULTRASOUND TO CHECK FETAL GROWTH: Primary | ICD-10-CM

## 2022-12-29 DIAGNOSIS — O09.213 CURRENT PREGNANCY WITH HISTORY OF PRE-TERM LABOR IN THIRD TRIMESTER: ICD-10-CM

## 2022-12-29 DIAGNOSIS — O99.213 OBESITY AFFECTING PREGNANCY IN THIRD TRIMESTER: ICD-10-CM

## 2022-12-29 PROCEDURE — 76819 FETAL BIOPHYS PROFIL W/O NST: CPT | Performed by: OBSTETRICS & GYNECOLOGY

## 2022-12-29 PROCEDURE — 76805 OB US >/= 14 WKS SNGL FETUS: CPT | Performed by: OBSTETRICS & GYNECOLOGY

## 2023-01-05 ENCOUNTER — HOSPITAL ENCOUNTER (OUTPATIENT)
Age: 33
Setting detail: SPECIMEN
Discharge: HOME | End: 2023-01-05

## 2023-01-05 ENCOUNTER — ROUTINE PRENATAL (OUTPATIENT)
Dept: OBGYN | Age: 33
End: 2023-01-05

## 2023-01-05 VITALS
SYSTOLIC BLOOD PRESSURE: 128 MMHG | HEART RATE: 114 BPM | WEIGHT: 183 LBS | DIASTOLIC BLOOD PRESSURE: 91 MMHG | BODY MASS INDEX: 33.47 KG/M2

## 2023-01-05 DIAGNOSIS — O16.3 ELEVATED BLOOD PRESSURE AFFECTING PREGNANCY IN THIRD TRIMESTER, ANTEPARTUM: Primary | ICD-10-CM

## 2023-01-05 DIAGNOSIS — Z3A.30 30 WEEKS GESTATION OF PREGNANCY: ICD-10-CM

## 2023-01-05 DIAGNOSIS — E05.90 HYPERTHYROIDISM: ICD-10-CM

## 2023-01-05 DIAGNOSIS — O16.3 ELEVATED BLOOD PRESSURE AFFECTING PREGNANCY IN THIRD TRIMESTER, ANTEPARTUM: ICD-10-CM

## 2023-01-05 DIAGNOSIS — R03.0 ELEVATED BP WITHOUT DIAGNOSIS OF HYPERTENSION: ICD-10-CM

## 2023-01-05 LAB
ABSOLUTE EOS #: 0.15 K/UL (ref 0–0.44)
ABSOLUTE IMMATURE GRANULOCYTE: 0.13 K/UL (ref 0–0.3)
ABSOLUTE LYMPH #: 2.24 K/UL (ref 1.1–3.7)
ABSOLUTE MONO #: 1.09 K/UL (ref 0.1–1.2)
BASOPHILS # BLD: 0 % (ref 0–2)
BASOPHILS ABSOLUTE: 0.06 K/UL (ref 0–0.2)
EOSINOPHILS RELATIVE PERCENT: 1 % (ref 1–4)
GLUCOSE ADMINISTRATION: NORMAL
GLUCOSE TOLERANCE SCREEN 50G: 108 MG/DL (ref 70–135)
HCT VFR BLD CALC: 43.3 % (ref 36.3–47.1)
HEMOGLOBIN: 13 G/DL (ref 11.9–15.1)
IMMATURE GRANULOCYTES: 1 %
LYMPHOCYTES # BLD: 13 % (ref 24–43)
MCH RBC QN AUTO: 27 PG (ref 25.2–33.5)
MCHC RBC AUTO-ENTMCNC: 30 G/DL (ref 28.4–34.8)
MCV RBC AUTO: 89.8 FL (ref 82.6–102.9)
MONOCYTES # BLD: 6 % (ref 3–12)
NRBC AUTOMATED: 0 PER 100 WBC
PDW BLD-RTO: 13.1 % (ref 11.8–14.4)
PLATELET # BLD: ABNORMAL K/UL (ref 138–453)
PLATELET, FLUORESCENCE: NORMAL K/UL (ref 138–453)
RBC # BLD: 4.82 M/UL (ref 3.95–5.11)
SEG NEUTROPHILS: 79 % (ref 36–65)
SEGMENTED NEUTROPHILS ABSOLUTE COUNT: 14 K/UL (ref 1.5–8.1)
TSH SERPL DL<=0.05 MIU/L-ACNC: <0.01 UIU/ML (ref 0.3–5)
WBC # BLD: 17.7 K/UL (ref 3.5–11.3)

## 2023-01-05 RX ORDER — PNV NO.95/FERROUS FUM/FOLIC AC 28MG-0.8MG
1 TABLET ORAL DAILY
Qty: 30 TABLET | Refills: 4 | Status: SHIPPED | OUTPATIENT
Start: 2023-01-05 | End: 2023-02-04

## 2023-01-05 NOTE — PROGRESS NOTES
Prenatal Visit    Marquis Machado is a 34 y.o. female [id number] at 32w0d    The patient was seen and evaluated. She is doing well with no complaints today. She reports positive fetal movements. She denies contractions, vaginal bleeding and leakage of fluid. Signs and symptoms of labor and pre-eclampsia were reviewed with the patient in detail. She is to report any of these if they occur. She currently denies any of these. The patient is Rh + and Rhogam is not indicated in this pregnancy   The patient already received  the T-Dap Vaccine (27-36 weeks) this pregnancy. The patient already received  the influenza vaccine this year. The problem list reflects the active issues addressed during today's visit    Vitals:  BP: (!) 128/91  Weight: 183 lb (83 kg)  Heart Rate: (!) 114  Patient Position: Sitting  Fundal Height (cm): 32 cm  Fetal HR: 130  Movement: Present     28 week labs: .  1hr GTT: patient completing now   28 week CBC:   Lab Results   Component Value Date    WBC 13.3 (H) 2022    HGB 13.3 2022    HCT 41.8 2022    MCV 88.7 2022     2022     UA w/ Ur C&S: ordered     Assessment & Plan:  Marquis Machado is a 34 y.o. female [id number] at 32w0d   - 28 week labs ordered, patient drank her Glucola before this visit    - Tdap vaccination: already received     - Influenza vaccination: already received     - Rhogam: is not indicated in this pregnancy    -  testing indication starting 32 weeks GA: not indicated   - Warning signs reviewed and recommendations when to call or present to the hospital if she experiences signs or symptoms of  labor and pre-eclampsia were reviewed. - The patient was instructed on fetal kick counts.  She was instructed that the baby should move at a minimum of ten times within one hour after a meal. The patient was instructed to lay down on her left side twenty minutes after eating and count movements for up to one hour with a target value of ten movements. She was instructed to notify the office if she did not make that target after two attempts or if after any attempt there was less than four movements. Tachycardia    - Patient noted to be tachycardic 121 bpm today, repeat 114 bpm. Her T4 was high earlier in the pregnancy and came down to near normal on last check but patient has not had them drawn again despite lab order   - Repeat TSH and T4   - Patient asymptomatic   - Will obtain labs and follow up in person early next week    Elevated BP   - Vitals initially wnl but when repeated to recheck heart reate patient had diastolic BP of 91   - Asymptomatic   - Will obtain outpatient preE labs   - Asymptomatic     Patient Active Problem List    Diagnosis Date Noted    Elevated BP without diagnosis of hypertension 2023     Priority: Medium     23 Patient had elevated BP at prenatal appointment, preE labs **      Flu and Tdap given 22     Priority: Medium    Hyperthyroid (Overt) 2022     Priority: Medium     Repeat labs ordered 22  Consult referal to [facility] sent      Hx of PreE without SF 2022     G3 postpartum        18 M Apg 8/9 Wt 5#4 2019     Spontaneous  delivery @ 34w4d      Obesity affecting pregnancy in first trimester 2019     ASA 81 mg/d      History of  delivery, currently pregnant 2019    Uterine fibroids 10/31/2017    H/O sPTD x3 (G1 @ 36wks; G2 @ 34wks; G3 @ 34wks) 2017     U/S q 2 weeks from 16-34 weeks, [place] until 36.6 weeks. 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 Had negative culture.  Reports she has never had a genital lesion in the past.       ASCUS (atypical squamous cells of undetermined significance) on Pap smear     Depression 01/01/2010     prior to del 2010       Return in about 2 weeks (around 1/19/2023) for KIM Barraza DO  Ob/Gyn Resident  [facility], General acute hospital  1/5/2023, 5:25 PM

## 2023-01-06 ENCOUNTER — TELEPHONE (OUTPATIENT)
Dept: OBGYN | Age: 33
End: 2023-01-06

## 2023-01-06 PROBLEM — O09.93 HIGH-RISK PREGNANCY, THIRD TRIMESTER: Status: ACTIVE | Noted: 2023-01-06

## 2023-01-06 LAB
ALBUMIN SERPL-MCNC: 3.3 G/DL (ref 3.5–5.2)
ALBUMIN/GLOBULIN RATIO: 0.9 (ref 1–2.5)
ALP BLD-CCNC: 141 U/L (ref 35–104)
ALT SERPL-CCNC: 5 U/L (ref 5–33)
ANION GAP SERPL CALCULATED.3IONS-SCNC: 11 MMOL/L (ref 9–17)
AST SERPL-CCNC: 15 U/L
BILIRUB SERPL-MCNC: 0.2 MG/DL (ref 0.3–1.2)
BUN BLDV-MCNC: 6 MG/DL (ref 6–20)
CALCIUM SERPL-MCNC: 9.4 MG/DL (ref 8.6–10.4)
CHLORIDE BLD-SCNC: 99 MMOL/L (ref 98–107)
CO2: 19 MMOL/L (ref 20–31)
CREAT SERPL-MCNC: 0.42 MG/DL (ref 0.5–0.9)
CREATININE URINE: 143.4 MG/DL (ref 28–217)
GFR SERPL CREATININE-BSD FRML MDRD: >60 ML/MIN/1.73M2
GLUCOSE BLD-MCNC: 108 MG/DL (ref 70–99)
POTASSIUM SERPL-SCNC: 3.8 MMOL/L (ref 3.7–5.3)
REASON FOR REJECTION: NORMAL
SODIUM BLD-SCNC: 129 MMOL/L (ref 135–144)
T4 TOTAL: 17.1 UG/DL
TOTAL PROTEIN, URINE: 18 MG/DL
TOTAL PROTEIN: 7 G/DL (ref 6.4–8.3)
URINE TOTAL PROTEIN CREATININE RATIO: 0.13 (ref 0–0.2)
ZZ NTE CLEAN UP: ORDERED TEST: NORMAL
ZZ NTE WITH NAME CLEAN UP: SPECIMEN SOURCE: NORMAL

## 2023-01-06 NOTE — TELEPHONE ENCOUNTER
Outpatient labs reviewed. GTT wnl. TSH remains < 0.01, free T4 was ordered but total T4 was run, spoke to lab and Free T4 now added on and pending. PreE labs wnl, platelets clumped but appear adequate. Patient noted to be hyponatremic @ 129. She was asymptomatic yesterday, call made to patient to check in today and she did not answer. Will follow results of free T4. Patient has follow up appointment on Tuesday.

## 2023-01-12 ENCOUNTER — ROUTINE PRENATAL (OUTPATIENT)
Dept: PERINATAL CARE | Age: 33
End: 2023-01-12
Payer: MEDICARE

## 2023-01-12 VITALS
WEIGHT: 185 LBS | TEMPERATURE: 98 F | HEIGHT: 62 IN | HEART RATE: 104 BPM | BODY MASS INDEX: 34.04 KG/M2 | RESPIRATION RATE: 16 BRPM | DIASTOLIC BLOOD PRESSURE: 70 MMHG | SYSTOLIC BLOOD PRESSURE: 114 MMHG

## 2023-01-12 DIAGNOSIS — O99.213 OBESITY AFFECTING PREGNANCY IN THIRD TRIMESTER: ICD-10-CM

## 2023-01-12 DIAGNOSIS — O09.213 CURRENT PREGNANCY WITH HISTORY OF PRE-TERM LABOR IN THIRD TRIMESTER: Primary | ICD-10-CM

## 2023-01-12 DIAGNOSIS — O43.103 PLACENTAL ABNORMALITY IN THIRD TRIMESTER: ICD-10-CM

## 2023-01-12 DIAGNOSIS — O09.293 HISTORY OF PRE-ECLAMPSIA IN PRIOR PREGNANCY, CURRENTLY PREGNANT, THIRD TRIMESTER: ICD-10-CM

## 2023-01-12 DIAGNOSIS — Z3A.33 33 WEEKS GESTATION OF PREGNANCY: ICD-10-CM

## 2023-01-12 PROCEDURE — 76817 TRANSVAGINAL US OBSTETRIC: CPT | Performed by: OBSTETRICS & GYNECOLOGY

## 2023-01-12 PROCEDURE — 76815 OB US LIMITED FETUS(S): CPT | Performed by: OBSTETRICS & GYNECOLOGY

## 2023-01-12 PROCEDURE — 76819 FETAL BIOPHYS PROFIL W/O NST: CPT | Performed by: OBSTETRICS & GYNECOLOGY

## 2023-01-12 RX ORDER — METRONIDAZOLE 500 MG/1
500 TABLET ORAL 2 TIMES DAILY
Qty: 14 TABLET | Refills: 0 | Status: SHIPPED | OUTPATIENT
Start: 2023-01-12 | End: 2023-01-12 | Stop reason: CLARIF

## 2023-01-18 ENCOUNTER — HOSPITAL ENCOUNTER (OUTPATIENT)
Age: 33
Discharge: HOME | End: 2023-01-18
Payer: MEDICARE

## 2023-01-18 ENCOUNTER — ROUTINE PRENATAL (OUTPATIENT)
Dept: OBGYN | Age: 33
End: 2023-01-18
Payer: MEDICARE

## 2023-01-18 VITALS
SYSTOLIC BLOOD PRESSURE: 121 MMHG | WEIGHT: 186 LBS | BODY MASS INDEX: 34.02 KG/M2 | DIASTOLIC BLOOD PRESSURE: 78 MMHG | HEART RATE: 119 BPM

## 2023-01-18 DIAGNOSIS — R00.0 TACHYCARDIA: ICD-10-CM

## 2023-01-18 DIAGNOSIS — Z3A.30 30 WEEKS GESTATION OF PREGNANCY: ICD-10-CM

## 2023-01-18 DIAGNOSIS — O09.93 HRP (HIGH RISK PREGNANCY), THIRD TRIMESTER: Primary | ICD-10-CM

## 2023-01-18 DIAGNOSIS — Z3A.33 33 WEEKS GESTATION OF PREGNANCY: ICD-10-CM

## 2023-01-18 PROCEDURE — G8417 CALC BMI ABV UP PARAM F/U: HCPCS | Performed by: OBSTETRICS & GYNECOLOGY

## 2023-01-18 PROCEDURE — 87086 URINE CULTURE/COLONY COUNT: CPT

## 2023-01-18 PROCEDURE — 93005 ELECTROCARDIOGRAM TRACING: CPT | Performed by: OBSTETRICS & GYNECOLOGY

## 2023-01-18 PROCEDURE — 99213 OFFICE O/P EST LOW 20 MIN: CPT | Performed by: OBSTETRICS & GYNECOLOGY

## 2023-01-18 PROCEDURE — 1036F TOBACCO NON-USER: CPT | Performed by: OBSTETRICS & GYNECOLOGY

## 2023-01-18 PROCEDURE — G8427 DOCREV CUR MEDS BY ELIG CLIN: HCPCS | Performed by: OBSTETRICS & GYNECOLOGY

## 2023-01-18 PROCEDURE — G8482 FLU IMMUNIZE ORDER/ADMIN: HCPCS | Performed by: OBSTETRICS & GYNECOLOGY

## 2023-01-18 NOTE — TELEPHONE ENCOUNTER
Pt never came back in for her appt after we told her we could write a letter and she could speak to you.  Transferred to [facility]

## 2023-01-18 NOTE — PROGRESS NOTES
Inocente Jose is a 34 y.o. female 33w6d    [id number]    OB History    Para Term  AB Living   4 3 0 3 0 3   SAB IAB Ectopic Molar Multiple Live Births   0 0 0 0 0 3      # Outcome Date GA Lbr Toro/2nd Weight Sex Delivery Anes PTL Lv   4 Current            3  19 34w4d 07:17 / 00:34 5 lb 4.8 oz (2.405 kg) M Vag-Spont None Y SIRISHA   2  18 34w4d / 00:19 4 lb 8.3 oz (2.05 kg) M Vag-Spont None Y SIRISHA   1  05/29/10 36w2d  4 lb 4 oz (1.928 kg) M Vag-Spont  Y SIRISHA       Vitals  BP: 121/78  Weight: 186 lb (84.4 kg)  Heart Rate: (!) 119  Patient Position: Sitting  Albumin: Trace  Glucose: Negative  Fundal Height (cm): 33 cm  Fetal HR: 140  Movement: Present      The patient was seen and evaluated. There was positive fetal movements. No contractions or leakage of fluid. Signs and symptoms of  labor as well as labor were reviewed. The S/S of Pre-Eclampsia were reviewed with the patient in detail. She is to report any of these if they occur. She currently denies any of these. The patient had her 28 week labs completed. Hospital Outpatient Visit on 2023   Component Date Value Ref Range Status    Total Protein, Urine 2023 18  mg/dL Final    No normal range established.     Creatinine, Ur 2023 143.4  28.0 - 217.0 mg/dL Final    Urine Total Protein Creatinine Rat* 2023 0.13  0.00 - 0.20 Final   Hospital Outpatient Visit on 2023   Component Date Value Ref Range Status    WBC 2023 17.7 (A)  3.5 - 11.3 k/uL Final    RBC 2023 4.82  3.95 - 5.11 m/uL Final    Hemoglobin 2023 13.0  11.9 - 15.1 g/dL Final    Hematocrit 2023 43.3  36.3 - 47.1 % Final    MCV 2023 89.8  82.6 - 102.9 fL Final    MCH 2023 27.0  25.2 - 33.5 pg Final    MCHC 2023 30.0  28.4 - 34.8 g/dL Final    RDW 2023 13.1  11.8 - 14.4 % Final    Platelets  See Reflexed IPF Result  138 - 453 k/uL Final    NRBC Automated 2023 0.0  0.0 per 100 WBC Final    Seg Neutrophils 01/05/2023 79 (A)  36 - 65 % Final    Lymphocytes 01/05/2023 13 (A)  24 - 43 % Final    Monocytes 01/05/2023 6  3 - 12 % Final    Eosinophils % 01/05/2023 1  1 - 4 % Final    Basophils 01/05/2023 0  0 - 2 % Final    Immature Granulocytes 01/05/2023 1 (A)  0 % Final    Segs Absolute 01/05/2023 14.00 (A)  1.50 - 8.10 k/uL Final    Absolute Lymph # 01/05/2023 2.24  1.10 - 3.70 k/uL Final    Absolute Mono # 01/05/2023 1.09  0.10 - 1.20 k/uL Final    Absolute Eos # 01/05/2023 0.15  0.00 - 0.44 k/uL Final    Basophils Absolute 01/05/2023 0.06  0.00 - 0.20 k/uL Final    Absolute Immature Granulocyte 01/05/2023 0.13  0.00 - 0.30 k/uL Final    GLU ADMN 01/05/2023 Glucola   Final    Glucose tolerance screen 50g 01/05/2023 108  70 - 135 mg/dL Final    Glucose 01/05/2023 108 (A)  70 - 99 mg/dL Final    BUN 01/05/2023 6  6 - 20 mg/dL Final    Creatinine 01/05/2023 0.42 (A)  0.50 - 0.90 mg/dL Final    Est, Glom Filt Rate 01/05/2023 >60  >60 mL/min/1.73m2 Final    Comment:       Effective Oct 3, 2022        These results are not intended for use in patients <25years of age. eGFR results are calculated without a race factor using the 2021 CKD-EPI equation. Careful clinical correlation is recommended, particularly when comparing to results   calculated using previous equations. The CKD-EPI equation is less accurate in patients with extremes of muscle mass, extra-renal   metabolism of creatine, excessive creatine ingestion, or following therapy that affects   renal tubular secretion.       Calcium 01/05/2023 9.4  8.6 - 10.4 mg/dL Final    Sodium 01/05/2023 129 (A)  135 - 144 mmol/L Final    Potassium 01/05/2023 3.8  3.7 - 5.3 mmol/L Final    Chloride 01/05/2023 99  98 - 107 mmol/L Final    CO2 01/05/2023 19 (A)  20 - 31 mmol/L Final    Anion Gap 01/05/2023 11  9 - 17 mmol/L Final    Alkaline Phosphatase 01/05/2023 141 (A)  35 - 104 U/L Final    ALT 01/05/2023 5  5 - 33 U/L Final AST 2023 15  <32 U/L Final    Total Bilirubin 2023 0.2 (A)  0.3 - 1.2 mg/dL Final    Total Protein 2023 7.0  6.4 - 8.3 g/dL Final    Albumin 2023 3.3 (A)  3.5 - 5.2 g/dL Final    Albumin/Globulin Ratio 2023 0.9 (A)  1.0 - 2.5 Final    T4, Total 2023 17.1  ug/dL Final    TSH 2023 <0.01 (A)  0.30 - 5.00 uIU/mL Final    Platelet, Fluorescence 2023 Platelet clumps present, count appears adequate. 138 - 453 k/uL Final    Specimen Source 2023 . BLOOD   Final    Ordered Test 2023  FT4   Final    Reason for Rejection 2023 Unable to perform testing: Specimen quantity not sufficient. Final   ]     (hx of pre-term)    Plans to deliver: [address] [facility] they have 3 boys     1st trimester screen/NIPs: placing referral- checking with insurance    AFP    Fetal Echo    High Risk:  Hx of  Delivery x 3- needs referral for CL starting at 16 weeks and progesterone to [facility]  Hx of HSV2  Uterine Fibriods  Hx of low TORI-A  Cervical shortening in previous pregnancy  Hx obesity- recommend ASA 81mg after 12 weeks    LOW TSH- NEED REPEAT 4 weeks   Early 1 hr GTT: Yes    COVID Vaccine: declined, counseled on recommendation per ACOG     Flu Vaccine:    Tdap:    Rhogam:NO O+    1hr GTT:    3hr GTT:    GBS:     T-Dap Vaccine (27-36 weeks): pt received flu and tdap vaccine already    The patient was instructed on fetal kick counts and was given a kick sheet to complete every 8 hours. She was instructed that the baby should move at a minimum of ten times within one hour after a meal. The patient was instructed to lay down on her left side twenty minutes after eating and count movements for up to one hour with a target value of ten movements. She was instructed to notify the office if she did not make that target after two attempts or if after any attempt there was less than four movements. The patient reports that the targets have been made Yes.      Testing:  Not indicated    Assessment:  Ministerio Allen is a 34 y.o. female  2. [id number]  3. 33w6d    Patient Active Problem List    Diagnosis Date Noted    High-risk pregnancy, third trimester  T4 free normal  2023     Priority: Medium     T4 Free normal lab results 23       Flu and Tdap given 22     Priority: Medium    Elevated BP without diagnosis of hypertension 2023 Patient had elevated BP at prenatal appointment, preE labs **      Hx of PreE without SF 2022     G3 postpartum        19 M Apg 8/9 Wt 5#4 2019     Spontaneous  delivery @ 34w4d      Obesity affecting pregnancy in first trimester 2019     ASA 81 mg/d      History of  delivery, currently pregnant 2019    Uterine fibroids 10/31/2017    H/O sPTD x3 (G1 @ 36wks; G2 @ 34wks; G3 @ 34wks) 2017     U/S q 2 weeks from 16-34 weeks, [place] until 36.6 weeks. 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 Had negative culture. Reports she has never had a genital lesion in the past.       ASCUS (atypical squamous cells of undetermined significance) on Pap smear     Depression 2010     prior to del 2010          Diagnosis Orders   1. HRP (high risk pregnancy), third trimester        2. 33 weeks gestation of pregnancy        3. Tachycardia  EKG 12 Lead    Amb External Referral To Cardiology                Plan:  The patient will return to the office for her next visit in 2 weeks. See antepartum flow sheet.      Pt will need valtrex Rx at next visit    Pt scheduled to see MFM 23    Reviewed s/s of PTL, preeclampsia and decreased FM - encouraged pt to go to [facility]. Guero's with any concerns. Pt s/p tdap and flu vaccine    Pt still tachycardic today and complaining of palpitations - pt to get EKG today, her TSH was unremarkable. Will have her referred to Cardiology as well. Encouraged pt to go to ER if she has any s/s of MI/stroke. Patient was seen with total face to face time of 20 minutes. More than 50% of this visit was on counseling and education regarding her    Diagnosis Orders   1. HRP (high risk pregnancy), third trimester        2. 33 weeks gestation of pregnancy        3. Tachycardia  EKG 12 Lead    Amb External Referral To Cardiology       and her options. She was also counseled on her preventative health maintenance recommendations and follow-up.     Chad Verde DO

## 2023-01-19 LAB
CULTURE: NORMAL
EKG ATRIAL RATE: 109 BPM
EKG P AXIS: 37 DEGREES
EKG P-R INTERVAL: 148 MS
EKG Q-T INTERVAL: 324 MS
EKG QRS DURATION: 68 MS
EKG QTC CALCULATION (BAZETT): 436 MS
EKG R AXIS: -17 DEGREES
EKG T AXIS: 32 DEGREES
EKG VENTRICULAR RATE: 109 BPM
SPECIMEN DESCRIPTION: NORMAL

## 2023-01-20 RX ORDER — HYDROXYPROGESTERONE CAPROATE 250 MG/ML
INJECTION INTRAMUSCULAR
Refills: 3 | OUTPATIENT
Start: 2023-01-20

## 2023-01-24 ENCOUNTER — TELEPHONE (OUTPATIENT)
Dept: OBGYN | Age: 33
End: 2023-01-24

## 2023-01-24 NOTE — TELEPHONE ENCOUNTER
[facility] received the referral and attempted to reach the pt. Pt was called and confirmed she had the number to call back but has not done that yet. Pt encouraged to call tomorrow am for scheduling. EKG results reviewed with the pt. Pt verb understanding of the plan of care.

## 2023-02-01 ENCOUNTER — ROUTINE PRENATAL (OUTPATIENT)
Dept: OBGYN | Age: 33
End: 2023-02-01
Payer: MEDICAID

## 2023-02-01 ENCOUNTER — HOSPITAL ENCOUNTER (OUTPATIENT)
Age: 33
Setting detail: SPECIMEN
Discharge: HOME | End: 2023-02-01

## 2023-02-01 VITALS
SYSTOLIC BLOOD PRESSURE: 123 MMHG | DIASTOLIC BLOOD PRESSURE: 83 MMHG | HEART RATE: 118 BPM | WEIGHT: 185 LBS | BODY MASS INDEX: 33.84 KG/M2

## 2023-02-01 DIAGNOSIS — O09.93 HRP (HIGH RISK PREGNANCY), THIRD TRIMESTER: ICD-10-CM

## 2023-02-01 DIAGNOSIS — O09.93 HRP (HIGH RISK PREGNANCY), THIRD TRIMESTER: Primary | ICD-10-CM

## 2023-02-01 DIAGNOSIS — Z3A.35 35 WEEKS GESTATION OF PREGNANCY: ICD-10-CM

## 2023-02-01 PROCEDURE — 99213 OFFICE O/P EST LOW 20 MIN: CPT | Performed by: OBSTETRICS & GYNECOLOGY

## 2023-02-01 RX ORDER — VALACYCLOVIR HYDROCHLORIDE 500 MG/1
500 TABLET, FILM COATED ORAL 2 TIMES DAILY
Qty: 60 TABLET | Refills: 1 | Status: CANCELLED | OUTPATIENT
Start: 2023-02-01

## 2023-02-01 NOTE — PROGRESS NOTES
Adrian Kamara is a 34 y.o. female 35w6d    [id number]    OB History    Para Term  AB Living   4 3 0 3 0 3   SAB IAB Ectopic Molar Multiple Live Births   0 0 0 0 0 3      # Outcome Date GA Lbr Toro/2nd Weight Sex Delivery Anes PTL Lv   4 Current            3  19 34w4d 07:17 / 00:34 5 lb 4.8 oz (2.405 kg) M Vag-Spont None Y SIRISHA   2  18 34w4d / 00:19 4 lb 8.3 oz (2.05 kg) M Vag-Spont None Y SIRISHA   1  05/29/10 36w2d  4 lb 4 oz (1.928 kg) M Vag-Spont  Y SIRISHA         Vitals  BP: 123/83  Weight: 185 lb (83.9 kg)  Heart Rate: (!) 118  Patient Position: Sitting  Albumin: Trace  Glucose: Negative  Fundal Height (cm): 35 cm  Fetal HR: 135  Movement: Present      The patient was seen and evaluated. There was positive fetal movements. No contractions or leakage of fluid. Signs and symptoms of labor were reviewed. The S/S of Pre-Eclampsia were reviewed with the patient in detail. She is to report any of these if they occur. She currently denies any of these. The patient was instructed on fetal kick counts and was given a kick sheet to complete every 8 hours. She was instructed that the baby should move at a minimum of ten times within one hour after a meal. The patient was instructed to lay down on her left side twenty minutes after eating and count movements for up to one hour with a target value of ten movements. She was instructed to notify the office if she did not make that target after two attempts or if after any attempt there was less than four movements. The patient reports that the targets have been made Yes.      (hx of pre-term)    Plans to deliver: [address] [facility] they have 3 boys     1st trimester screen/NIPs: placing referral- checking with insurance    AFP    Fetal Echo    High Risk:  Hx of  Delivery x 3- needs referral for CL starting at 16 weeks and progesterone to [facility]  Hx of HSV2  Uterine Fibriods  Hx of low TORI-A  Cervical shortening in previous pregnancy  Hx obesity- recommend ASA 81mg after 12 weeks    LOW TSH- NEED REPEAT 4 weeks   Early 1 hr GTT: Yes    COVID Vaccine: declined, counseled on recommendation per ACOG     Flu Vaccine:    Tdap:    Rhogam:NO O+    1hr GTT:    3hr GTT:    GBS:     T-Dap Vaccine (27-36 weeks) Completed: Completed - along with flu vaccine    Allergies: Allergies as of 2023    (No Known Allergies)       Group Beta Strep collection was completed. Yes   GBS Results: pending  Sensitivities for clindamycin and erythromycin were ordered. The patient was counseled on the mandatory call ahead policy. She has been instructed to call the office at anytime prior to going into the hospital so the on-call physician may direct her to the appropriate facility for care. Exceptions were reviewed including but not limited to: Decreased fetal movement, vaginal Bleeding or hemorrhage, trauma, readily expectant delivery, or any instance where she feels 911 should be utilized. The patient was counseled on Labor & Delivery. Route of delivery and counseling on vaginal, operative vaginal, and  sections were completed with the risks of each to both the patient as well as her baby. The possibility of a blood transfusion was discussed as well. The patient was not opposed to receiving a transfusion if needed. The patient was counseled on types of analgesia during labor and is considering either Regional or IV medication the risks, benefits and alternatives were discussed.  Testing:  Not indicated      Assessment:  1Everton Jeong is a 34 y.o. female  2.  [id number]  3. 35w6d      Patient Active Problem List    Diagnosis Date Noted    High-risk pregnancy, third trimester  T4 free normal  2023     Priority: Medium     Overview Note:     T4 Free normal lab results 23       Flu and Tdap given 22     Priority: Medium    Elevated BP without diagnosis of hypertension 2023     Overview Note:     23 Patient had elevated BP at prenatal appointment, preE labs **      Hx of PreE without SF 2022     Overview Note:     G3 postpartum        19 M Apg 8/9 Wt 5#4 2019     Overview Note:     Spontaneous  delivery @ 34w4d      Obesity affecting pregnancy in first trimester 2019     Overview Note:     ASA 81 mg/d      History of  delivery, currently pregnant 2019    Uterine fibroids 10/31/2017    H/O sPTD x3 (G1 @ 36wks; G2 @ 34wks; G3 @ 34wks) 2017     Overview Note:     U/S q 2 weeks from 16-34 weeks, Spur until 36.6 weeks. Advise interval fetal growth scans every 3-4 weeks for duration of pregnancy and fetal  surveillance from 32 weeks (NST twice weekly, and weekly evaluation of umbilical artery dopplers, GELA and BPP testing)        Chlamydia (TOR neg) 2016    HSV2 2015     Overview Note:     Patient reports she was told she had HSV 2 IgG. Had negative culture. Reports she has never had a genital lesion in the past.       ASCUS (atypical squamous cells of undetermined significance) on Pap smear     Depression 2010     Overview Note:     prior to del 2010          Diagnosis Orders   1. HRP (high risk pregnancy), third trimester  Culture, Strep B Screen, Vaginal/Rectal    HERPES PROFILE      2. 35 weeks gestation of pregnancy                  Plan:  The patient will return to the office for her next visit in 1 weeks. See antepartum flow sheet. Reviewed s/s of PTL, preeclampsia and decreased FM - encouraged pt to go to [facility] with any concerns. Pt declining valtrex today  - wants to be retested, she does not believe the +HSV2 IgG was her blood sample. Pt willing to get lab drawn today, if it is still positive, will Telida pt again on starting the Valtrex to prevent an outbreak. Pt informed if she has an outbreak at the time of induction/delivery, then she will need a csection.     Pt scheduled with M on 2/9/23    GBS collected      Patient was seen with total face to face time of 20 minutes. More than 50% of this visit was on counseling and education regarding her    Diagnosis Orders   1. HRP (high risk pregnancy), third trimester  Culture, Strep B Screen, Vaginal/Rectal    HERPES PROFILE      2. 35 weeks gestation of pregnancy         and her options. She was also counseled on her preventative health maintenance recommendations and follow-up.     Silvia Del Rio, DO

## 2023-02-04 LAB
MICROORGANISM SPEC CULT: ABNORMAL
SPECIMEN DESCRIPTION: ABNORMAL

## 2023-02-06 PROBLEM — O99.820 GBS (GROUP B STREPTOCOCCUS CARRIER), +RV CULTURE, CURRENTLY PREGNANT: Status: ACTIVE | Noted: 2023-02-06

## 2023-02-08 ENCOUNTER — ROUTINE PRENATAL (OUTPATIENT)
Dept: OBGYN | Age: 33
End: 2023-02-08
Payer: MEDICAID

## 2023-02-08 VITALS
HEART RATE: 96 BPM | WEIGHT: 186 LBS | DIASTOLIC BLOOD PRESSURE: 87 MMHG | BODY MASS INDEX: 34.02 KG/M2 | SYSTOLIC BLOOD PRESSURE: 130 MMHG

## 2023-02-08 DIAGNOSIS — R03.0 ELEVATED BP WITHOUT DIAGNOSIS OF HYPERTENSION: ICD-10-CM

## 2023-02-08 DIAGNOSIS — O99.820 GBS (GROUP B STREPTOCOCCUS CARRIER), +RV CULTURE, CURRENTLY PREGNANT: Primary | ICD-10-CM

## 2023-02-08 PROCEDURE — 99213 OFFICE O/P EST LOW 20 MIN: CPT | Performed by: STUDENT IN AN ORGANIZED HEALTH CARE EDUCATION/TRAINING PROGRAM

## 2023-02-08 NOTE — PROGRESS NOTES
Prenatal Visit    Davian Carmen is a 34 y.o. female [id number] at 36w7d    The patient was seen and evaluated. She is complaining of irregular contractions that are worse at night. She reports positive fetal movements. She denies contractions, vaginal bleeding and leakage of fluid. Signs and symptoms of labor and pre-eclampsia were reviewed with the patient in detail. She is to report any of these if they occur. She currently denies any signs or symptoms of pre-clampsia including headache, vision changes, RUQ pain. The patient is Rh positive and Rhogam is not indicated in this pregnancy,  The patient declined the T-Dap Vaccine (27-36 weeks) this pregnancy. The patient already received  the influenza vaccine this year. The patient declined the COVID-19 vaccine this year. The problem list reflects the active issues addressed during today's visit. Allergies:  No Known Allergies    Vitals:    BP: 130/87  Weight: 186 lb (84.4 kg)  Heart Rate: 96  Albumin: Trace  Glucose: Negative  Fundal Height (cm): 36 cm  Fetal HR: 155  Movement: Present     Labs:  Group Beta Strep collection was positive. Assessment & Plan:  Davian Carmen is a 34 y.o. female [id number] at 36w6d   - GBS testing was completed, sensitivities for clindamycin and erythromycin were not ordered. Patient aware she will need Pen G in labor    - Tdap vaccination: patient declined    - Rhogam: not indicated   - Influenza vaccination: 22   - COVID-19 vaccination: R/B/A discussed with increased risk of both maternal and fetal morbidity and mortality in unvaccinated pregnant patients who contract COVID-19- patient declined today   -  testing indication: obesity, history of PreE - scheduled for MFM appt     - Indications for  section:  N/A   - Warning signs reviewed and recommendations when to call or present to the hospital if she experiences signs or symptoms of  labor and pre-eclampsia were reviewed.    - The patient was instructed on fetal kick counts. She was instructed that the baby should move at a minimum of ten times within one hour after a meal. The patient was instructed to lay down on her left side twenty minutes after eating and count movements for up to one hour with a target value of ten movements. She was instructed to notify the office if she did not make that target after two attempts or if after any attempt there was less than four movements. - Route of delivery and counseling on vaginal, operative vaginal, and  sections were completed with the risks of each to both the patient as well as her baby. The possibility of a blood transfusion was discussed as well. The patient was not opposed to receiving a transfusion if needed. - The patient was counseled on types of analgesia during labor and is considering IV Nubain.  - The patient was counseled on the need to choose her pediatrician for her baby. - The patient was counseled on postpartum plans for dysmenorrhea and she is undecided.   - SVE 3/50/-2    Patient Active Problem List    Diagnosis Date Noted    High-risk pregnancy, third trimester  T4 free normal  2023     Priority: Medium     T4 Free normal lab results 23       Flu and Tdap given 22     Priority: Medium    Hx of PreE without SF 2022     Priority: Medium     G3 postpartum       GBS (group B Streptococcus carrier), +RV culture, currently pregnant 2023     Will need abx in labor      Elevated BP without diagnosis of hypertension 2023 Patient had elevated BP at prenatal appointment, preE labs wnl, P/C 0.13       19 M Apg 8/9 Wt 5#4 2019     Spontaneous  delivery @ 34w4d      Obesity affecting pregnancy in first trimester 2019     ASA 81 mg/d      History of  delivery, currently pregnant 2019    Uterine fibroids 10/31/2017    H/O sPTD x3 (G1 @ 36wks; G2 @ 34wks; G3 @ 34wks) 2017 U/S q 2 weeks from 16-34 weeks, [place]acey until 36.6 weeks. 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 Had negative culture. Reports she has never had a genital lesion in the past.       ASCUS (atypical squamous cells of undetermined significance) on Pap smear     Depression 2010     prior to del 2010       Return in about 1 week (around 2/15/2023) for 1 week KIM Jaramillo DO  Ob/Gyn Resident  [facility], [facility]  2023, 4:03 PM

## 2023-02-13 ENCOUNTER — HOSPITAL ENCOUNTER (INPATIENT)
Age: 33
LOS: 2 days | Discharge: HOME | DRG: 560 | End: 2023-02-15
Attending: OBSTETRICS & GYNECOLOGY | Admitting: STUDENT IN AN ORGANIZED HEALTH CARE EDUCATION/TRAINING PROGRAM
Payer: MEDICAID

## 2023-02-13 ENCOUNTER — TELEPHONE (OUTPATIENT)
Dept: OBGYN | Age: 33
End: 2023-02-13

## 2023-02-13 PROBLEM — O09.90 HIGH-RISK PREGNANCY, UNSPECIFIED TRIMESTER: Status: ACTIVE | Noted: 2023-02-13

## 2023-02-13 PROBLEM — Z3A.37 37 WEEKS GESTATION OF PREGNANCY: Status: ACTIVE | Noted: 2023-02-13

## 2023-02-13 LAB
ABO/RH: NORMAL
ABSOLUTE EOS #: 0 K/UL (ref 0–0.4)
ABSOLUTE IMMATURE GRANULOCYTE: 0 K/UL (ref 0–0.3)
ABSOLUTE LYMPH #: 2.81 K/UL (ref 1–4.8)
ABSOLUTE MONO #: 1.09 K/UL (ref 0.1–0.8)
ANTIBODY SCREEN: NEGATIVE
ARM BAND NUMBER: NORMAL
BASOPHILS # BLD: 0 % (ref 0–2)
BASOPHILS ABSOLUTE: 0 K/UL (ref 0–0.2)
EOSINOPHILS RELATIVE PERCENT: 0 % (ref 1–4)
EXPIRATION DATE: NORMAL
HCT VFR BLD AUTO: 40.2 % (ref 36.3–47.1)
HGB BLD-MCNC: 13.3 G/DL (ref 11.9–15.1)
IMMATURE GRANULOCYTES: 0 %
LYMPHOCYTES # BLD: 18 % (ref 24–44)
MCH RBC QN AUTO: 27 PG (ref 25.2–33.5)
MCHC RBC AUTO-ENTMCNC: 33.1 G/DL (ref 28.4–34.8)
MCV RBC AUTO: 81.7 FL (ref 82.6–102.9)
MONOCYTES # BLD: 7 % (ref 1–7)
MORPHOLOGY: ABNORMAL
NRBC AUTOMATED: 0 PER 100 WBC
PDW BLD-RTO: 13.5 % (ref 11.8–14.4)
PLATELET # BLD AUTO: 233 K/UL (ref 138–453)
PMV BLD AUTO: 11.8 FL (ref 8.1–13.5)
RBC # BLD: 4.92 M/UL (ref 3.95–5.11)
SEG NEUTROPHILS: 75 % (ref 36–66)
SEGMENTED NEUTROPHILS ABSOLUTE COUNT: 11.7 K/UL (ref 1.8–7.7)
T PALLIDUM AB SER QL IA: NONREACTIVE
WBC # BLD AUTO: 15.6 K/UL (ref 3.5–11.3)

## 2023-02-13 PROCEDURE — 1220000000 HC SEMI PRIVATE OB R&B

## 2023-02-13 PROCEDURE — 6360000002 HC RX W HCPCS

## 2023-02-13 PROCEDURE — 10907ZC DRAINAGE OF AMNIOTIC FLUID, THERAPEUTIC FROM PRODUCTS OF CONCEPTION, VIA NATURAL OR ARTIFICIAL OPENING: ICD-10-PCS | Performed by: STUDENT IN AN ORGANIZED HEALTH CARE EDUCATION/TRAINING PROGRAM

## 2023-02-13 PROCEDURE — 85025 COMPLETE CBC W/AUTO DIFF WBC: CPT

## 2023-02-13 PROCEDURE — 86901 BLOOD TYPING SEROLOGIC RH(D): CPT

## 2023-02-13 PROCEDURE — 2580000003 HC RX 258: Performed by: STUDENT IN AN ORGANIZED HEALTH CARE EDUCATION/TRAINING PROGRAM

## 2023-02-13 PROCEDURE — 86780 TREPONEMA PALLIDUM: CPT

## 2023-02-13 PROCEDURE — 86900 BLOOD TYPING SEROLOGIC ABO: CPT

## 2023-02-13 PROCEDURE — 6360000002 HC RX W HCPCS: Performed by: STUDENT IN AN ORGANIZED HEALTH CARE EDUCATION/TRAINING PROGRAM

## 2023-02-13 PROCEDURE — 36415 COLL VENOUS BLD VENIPUNCTURE: CPT

## 2023-02-13 PROCEDURE — 6370000000 HC RX 637 (ALT 250 FOR IP): Performed by: STUDENT IN AN ORGANIZED HEALTH CARE EDUCATION/TRAINING PROGRAM

## 2023-02-13 PROCEDURE — 86850 RBC ANTIBODY SCREEN: CPT

## 2023-02-13 PROCEDURE — 7200000001 HC VAGINAL DELIVERY

## 2023-02-13 RX ORDER — IBUPROFEN 600 MG/1
600 TABLET ORAL EVERY 6 HOURS PRN
Status: DISCONTINUED | OUTPATIENT
Start: 2023-02-13 | End: 2023-02-15 | Stop reason: HOSPADM

## 2023-02-13 RX ORDER — SODIUM CHLORIDE, SODIUM LACTATE, POTASSIUM CHLORIDE, CALCIUM CHLORIDE 600; 310; 30; 20 MG/100ML; MG/100ML; MG/100ML; MG/100ML
INJECTION, SOLUTION INTRAVENOUS CONTINUOUS
Status: DISCONTINUED | OUTPATIENT
Start: 2023-02-13 | End: 2023-02-13

## 2023-02-13 RX ORDER — SODIUM CHLORIDE 0.9 % (FLUSH) 0.9 %
5-40 SYRINGE (ML) INJECTION EVERY 12 HOURS SCHEDULED
Status: DISCONTINUED | OUTPATIENT
Start: 2023-02-13 | End: 2023-02-13

## 2023-02-13 RX ORDER — ACETAMINOPHEN 500 MG
1000 TABLET ORAL EVERY 6 HOURS PRN
Qty: 120 TABLET | Refills: 0 | Status: SHIPPED | OUTPATIENT
Start: 2023-02-13 | End: 2023-03-15

## 2023-02-13 RX ORDER — MORPHINE SULFATE 4 MG/ML
INJECTION, SOLUTION INTRAMUSCULAR; INTRAVENOUS
Status: COMPLETED
Start: 2023-02-13 | End: 2023-02-13

## 2023-02-13 RX ORDER — DOCUSATE SODIUM 100 MG/1
100 CAPSULE, LIQUID FILLED ORAL 2 TIMES DAILY
Status: DISCONTINUED | OUTPATIENT
Start: 2023-02-13 | End: 2023-02-15 | Stop reason: HOSPADM

## 2023-02-13 RX ORDER — SODIUM CHLORIDE 0.9 % (FLUSH) 0.9 %
5-40 SYRINGE (ML) INJECTION PRN
Status: DISCONTINUED | OUTPATIENT
Start: 2023-02-13 | End: 2023-02-13

## 2023-02-13 RX ORDER — SODIUM CHLORIDE 9 MG/ML
INJECTION, SOLUTION INTRAVENOUS PRN
Status: DISCONTINUED | OUTPATIENT
Start: 2023-02-13 | End: 2023-02-15 | Stop reason: HOSPADM

## 2023-02-13 RX ORDER — ONDANSETRON 4 MG/1
4 TABLET, ORALLY DISINTEGRATING ORAL EVERY 8 HOURS PRN
Status: DISCONTINUED | OUTPATIENT
Start: 2023-02-13 | End: 2023-02-13

## 2023-02-13 RX ORDER — ONDANSETRON 2 MG/ML
4 INJECTION INTRAMUSCULAR; INTRAVENOUS EVERY 6 HOURS PRN
Status: DISCONTINUED | OUTPATIENT
Start: 2023-02-13 | End: 2023-02-13

## 2023-02-13 RX ORDER — METHYLERGONOVINE MALEATE 0.2 MG/ML
200 INJECTION INTRAVENOUS ONCE
Status: COMPLETED | OUTPATIENT
Start: 2023-02-13 | End: 2023-02-13

## 2023-02-13 RX ORDER — SODIUM CHLORIDE 0.9 % (FLUSH) 0.9 %
5-40 SYRINGE (ML) INJECTION PRN
Status: DISCONTINUED | OUTPATIENT
Start: 2023-02-13 | End: 2023-02-15 | Stop reason: HOSPADM

## 2023-02-13 RX ORDER — SENNA AND DOCUSATE SODIUM 50; 8.6 MG/1; MG/1
1 TABLET, FILM COATED ORAL DAILY
Qty: 14 TABLET | Refills: 0 | Status: SHIPPED | OUTPATIENT
Start: 2023-02-13 | End: 2023-02-27

## 2023-02-13 RX ORDER — KETOROLAC TROMETHAMINE 30 MG/ML
INJECTION, SOLUTION INTRAMUSCULAR; INTRAVENOUS
Status: COMPLETED
Start: 2023-02-13 | End: 2023-02-13

## 2023-02-13 RX ORDER — MORPHINE SULFATE 4 MG/ML
4 INJECTION, SOLUTION INTRAMUSCULAR; INTRAVENOUS EVERY 4 HOURS PRN
Status: DISCONTINUED | OUTPATIENT
Start: 2023-02-13 | End: 2023-02-15 | Stop reason: HOSPADM

## 2023-02-13 RX ORDER — ACETAMINOPHEN 500 MG
1000 TABLET ORAL EVERY 8 HOURS SCHEDULED
Status: DISCONTINUED | OUTPATIENT
Start: 2023-02-13 | End: 2023-02-15 | Stop reason: HOSPADM

## 2023-02-13 RX ORDER — IBUPROFEN 600 MG/1
600 TABLET ORAL EVERY 6 HOURS PRN
Qty: 30 TABLET | Refills: 0 | Status: SHIPPED | OUTPATIENT
Start: 2023-02-13

## 2023-02-13 RX ORDER — DIPHENHYDRAMINE HCL 25 MG
25 TABLET ORAL EVERY 4 HOURS PRN
Status: DISCONTINUED | OUTPATIENT
Start: 2023-02-13 | End: 2023-02-13

## 2023-02-13 RX ORDER — ACETAMINOPHEN 500 MG
1000 TABLET ORAL EVERY 6 HOURS PRN
Status: DISCONTINUED | OUTPATIENT
Start: 2023-02-13 | End: 2023-02-13

## 2023-02-13 RX ORDER — SODIUM CHLORIDE 0.9 % (FLUSH) 0.9 %
5-40 SYRINGE (ML) INJECTION EVERY 12 HOURS SCHEDULED
Status: DISCONTINUED | OUTPATIENT
Start: 2023-02-13 | End: 2023-02-15 | Stop reason: HOSPADM

## 2023-02-13 RX ORDER — ACETAMINOPHEN 325 MG/1
650 TABLET ORAL EVERY 4 HOURS PRN
Status: DISCONTINUED | OUTPATIENT
Start: 2023-02-13 | End: 2023-02-15 | Stop reason: HOSPADM

## 2023-02-13 RX ORDER — LANOLIN 72 %
OINTMENT (GRAM) TOPICAL PRN
Status: DISCONTINUED | OUTPATIENT
Start: 2023-02-13 | End: 2023-02-15 | Stop reason: HOSPADM

## 2023-02-13 RX ORDER — SIMETHICONE 80 MG
80 TABLET,CHEWABLE ORAL EVERY 6 HOURS PRN
Status: DISCONTINUED | OUTPATIENT
Start: 2023-02-13 | End: 2023-02-15 | Stop reason: HOSPADM

## 2023-02-13 RX ORDER — SODIUM CHLORIDE 9 MG/ML
25 INJECTION, SOLUTION INTRAVENOUS PRN
Status: DISCONTINUED | OUTPATIENT
Start: 2023-02-13 | End: 2023-02-13

## 2023-02-13 RX ORDER — SODIUM CHLORIDE, SODIUM LACTATE, POTASSIUM CHLORIDE, AND CALCIUM CHLORIDE .6; .31; .03; .02 G/100ML; G/100ML; G/100ML; G/100ML
1000 INJECTION, SOLUTION INTRAVENOUS PRN
Status: DISCONTINUED | OUTPATIENT
Start: 2023-02-13 | End: 2023-02-13

## 2023-02-13 RX ORDER — KETOROLAC TROMETHAMINE 30 MG/ML
30 INJECTION, SOLUTION INTRAMUSCULAR; INTRAVENOUS ONCE
Status: COMPLETED | OUTPATIENT
Start: 2023-02-13 | End: 2023-02-13

## 2023-02-13 RX ORDER — ACETAMINOPHEN 325 MG/1
650 TABLET ORAL EVERY 4 HOURS PRN
Status: DISCONTINUED | OUTPATIENT
Start: 2023-02-13 | End: 2023-02-13

## 2023-02-13 RX ORDER — ONDANSETRON 4 MG/1
4 TABLET, ORALLY DISINTEGRATING ORAL EVERY 4 HOURS PRN
Status: DISCONTINUED | OUTPATIENT
Start: 2023-02-13 | End: 2023-02-15 | Stop reason: HOSPADM

## 2023-02-13 RX ORDER — SODIUM CHLORIDE, SODIUM LACTATE, POTASSIUM CHLORIDE, AND CALCIUM CHLORIDE .6; .31; .03; .02 G/100ML; G/100ML; G/100ML; G/100ML
500 INJECTION, SOLUTION INTRAVENOUS PRN
Status: DISCONTINUED | OUTPATIENT
Start: 2023-02-13 | End: 2023-02-13

## 2023-02-13 RX ORDER — HYDROCORTISONE 25 MG/G
CREAM TOPICAL
Status: DISCONTINUED | OUTPATIENT
Start: 2023-02-13 | End: 2023-02-15 | Stop reason: HOSPADM

## 2023-02-13 RX ADMIN — METHYLERGONOVINE MALEATE 200 MCG: 0.2 INJECTION, SOLUTION INTRAMUSCULAR; INTRAVENOUS at 11:13

## 2023-02-13 RX ADMIN — IBUPROFEN 600 MG: 600 TABLET, FILM COATED ORAL at 16:26

## 2023-02-13 RX ADMIN — MORPHINE SULFATE 4 MG: 4 INJECTION INTRAVENOUS at 11:08

## 2023-02-13 RX ADMIN — DOCUSATE SODIUM 100 MG: 100 CAPSULE ORAL at 14:13

## 2023-02-13 RX ADMIN — KETOROLAC TROMETHAMINE 30 MG: 30 INJECTION, SOLUTION INTRAMUSCULAR; INTRAVENOUS at 09:56

## 2023-02-13 RX ADMIN — ACETAMINOPHEN 650 MG: 500 TABLET ORAL at 10:52

## 2023-02-13 RX ADMIN — Medication 909 ML: at 09:45

## 2023-02-13 RX ADMIN — SODIUM CHLORIDE, POTASSIUM CHLORIDE, SODIUM LACTATE AND CALCIUM CHLORIDE: 600; 310; 30; 20 INJECTION, SOLUTION INTRAVENOUS at 09:25

## 2023-02-13 RX ADMIN — MORPHINE SULFATE 4 MG: 4 INJECTION, SOLUTION INTRAMUSCULAR; INTRAVENOUS at 11:08

## 2023-02-13 RX ADMIN — DOCUSATE SODIUM 100 MG: 100 CAPSULE ORAL at 20:00

## 2023-02-13 RX ADMIN — SODIUM CHLORIDE, PRESERVATIVE FREE 10 ML: 5 INJECTION INTRAVENOUS at 20:01

## 2023-02-13 RX ADMIN — ACETAMINOPHEN 1000 MG: 500 TABLET ORAL at 20:00

## 2023-02-13 ASSESSMENT — PAIN DESCRIPTION - DESCRIPTORS
DESCRIPTORS: CRAMPING
DESCRIPTORS: CRAMPING

## 2023-02-13 ASSESSMENT — PAIN SCALES - GENERAL
PAINLEVEL_OUTOF10: 4
PAINLEVEL_OUTOF10: 9
PAINLEVEL_OUTOF10: 9
PAINLEVEL_OUTOF10: 5

## 2023-02-13 ASSESSMENT — PAIN DESCRIPTION - LOCATION
LOCATION: ABDOMEN
LOCATION: ABDOMEN

## 2023-02-13 ASSESSMENT — PAIN - FUNCTIONAL ASSESSMENT: PAIN_FUNCTIONAL_ASSESSMENT: ACTIVITIES ARE NOT PREVENTED

## 2023-02-13 ASSESSMENT — PAIN DESCRIPTION - ORIENTATION: ORIENTATION: LOWER

## 2023-02-13 NOTE — DISCHARGE SUMMARY
Obstetric Discharge Summary  [address] [facility]    Patient Name: West Knxo  Patient : 1990  Primary Care Physician: Blake Justin, KIMBERLY - CNP  Admit Date: 2023    Principal Diagnosis: IUP at 37w4d, admitted for Spontaneous Labor       Her pregnancy has been complicated by:   Patient Active Problem List   Diagnosis    ASCUS (atypical squamous cells of undetermined significance) on Pap smear    HSV2    H/O sPTD x3 (G1 @ 36wks; G2 @ 34wks; G3 @ 34wks)    Uterine fibroids    Depression    Chlamydia (TOR neg)    Obesity affecting pregnancy in first trimester    History of  delivery, currently pregnant     19 M Apg 8/9 Wt 5#4    Hx of PreE without SF    Flu and Tdap given 22    Elevated BP without diagnosis of hypertension    High-risk pregnancy, third trimester  T4 free normal     GBS (group B Streptococcus carrier), +RV culture, currently pregnant    High-risk pregnancy, unspecified trimester    37 weeks gestation of pregnancy     23 F Apg 8/9 Wt 6#9       Infection Present?: No  Hospital Acquired: No    Surgical Operations & Procedures:  Analgesia: None  Delivery Type: Spontaneous Vaginal Delivery: See Labor and Delivery Summary   Laceration(s): Absent    Consultations: None    Pertinent Findings & Procedures:   West Knox is a 34 y.o. female [id number] at 37w4d admitted for Spontaneous Labor; received AROM (clr). She delivered by spontaneous vaginal a Live Born infant on 23. She received IM Methergine shortly after delivery due to bleeding. Information for the patient's :  William Hankins [[id number]]   female   Birth Weight: 6 lb 9.5 oz (2.99 kg)     Apgars: 8 at 1 minute and 9 at 5 minutes.      Postpartum course: normal.      Course of patient: uncomplicated    Discharge to: Home    Readmission planned: no     Recommendations on Discharge:     Medications:      Medication List        START taking these medications acetaminophen 500 MG tablet  Commonly known as: TYLENOL  Take 2 tablets by mouth every 6 hours as needed for Pain     ibuprofen 600 MG tablet  Commonly known as: ADVIL;MOTRIN  Take 1 tablet by mouth every 6 hours as needed for Pain     sennosides-docusate sodium 8.6-50 MG tablet  Commonly known as: SENOKOT-S  Take 1 tablet by mouth daily for 14 days            CONTINUE taking these medications      Mom to be Belts Misc  by Does not apply route            ASK your doctor about these medications      ASPIRIN 81 PO     HYDROXYprogesterone caproate 250 MG/ML Oil oil injection     ondansetron 4 MG disintegrating tablet  Commonly known as: ZOFRAN-ODT  Take 1 tablet by mouth 3 times daily as needed for Nausea or Vomiting     Prenatal Vitamin 27-0.8 MG Tabs  Take 1 tablet by mouth daily               Where to Get Your Medications        These medications were sent to [facility] [address] [facility], [address], [place] [address]      Phone: [phone]   acetaminophen 500 MG tablet  ibuprofen 600 MG tablet  sennosides-docusate sodium 8.6-50 MG tablet           Activity: pelvic rest x 6 weeks, no lifting greater than 15 lbs  Diet: regular diet  Follow up: 2 weeks for  PP apt     Condition on discharge: stable    Discharge date: 2/15/23    Rohan Pemberton DO  Ob/Gyn Resident    Comments:  Home care and follow-up care were reviewed. Pelvic rest, and birth control were reviewed. Signs and symptoms of mastitis and post partum depression were reviewed. The patient is to notify her physician if any of these occur. The patient was counseled on secondary smoke risks and the increased risk of sudden infant death syndrome and respiratory problems to her baby with exposure. She was counseled on various alternate recommendations to decrease the exposure to secondary smoke to her children.

## 2023-02-13 NOTE — H&P
OBSTETRICAL HISTORY [facility]    Date: 2023       Time: 9:18 AM   Patient Name: Helen Purcell     Patient : 1990  Room/Bed: 5359/2829-24    Admission Date/Time: 2023  8:56 AM    CC: Spontaneous Labor     HPI: Helen Purcell is a 34 y.o. [id number] at 37w4d who presents c/o contractions that are worsening in frequency and intensity. The patient reports fetal movement is present, complains of contractions, denies loss of fluid, denies vaginal bleeding. Patient denies headache, vision changes, nausea, vomiting, fever, chills, shortness of breath, chest pain, RUQ pain, abdominal pain, diarrhea, change in color/amount/odor of vaginal discharge, dysuria or, hematuria. DATING:  LMP: Patient's last menstrual period was 2022.   Estimated Date of Delivery: 3/2/23   Based on: LMP, CW US  9 6/7 weeks GA    PREGNANCY RISK FACTORS:  Patient Active Problem List   Diagnosis    ASCUS (atypical squamous cells of undetermined significance) on Pap smear    HSV2    H/O sPTD x3 (G1 @ 36wks; G2 @ 34wks; G3 @ 34wks)    Uterine fibroids    Depression    Chlamydia (TOR neg)    Obesity affecting pregnancy in first trimester    History of  delivery, currently pregnant     / M Apg 8/9 Wt 5#4    Hx of PreE without SF    Flu and Tdap given 22    Elevated BP without diagnosis of hypertension    High-risk pregnancy, third trimester  T4 free normal     GBS (group B Streptococcus carrier), +RV culture, currently pregnant    High-risk pregnancy, unspecified trimester    37 weeks gestation of pregnancy        Steroids Given In This Pregnancy:  no     REVIEW OF SYSTEMS:   Constitutional: negative fever, negative chills, negative weight changes   HEENT: negative visual disturbances, negative headaches, negative dizziness, negative hearing loss  Breast: Negative breast abnormalities, negative breast lumps, negative nipple discharge  Respiratory: negative dyspnea, negative cough, negative SOB  Cardiovascular: negative chest pain,  negative palpitations, negative arrhythmia, negative syncope   Gastrointestinal: positive abdominal pain, negative RUQ pain, negative N/V, negative diarrhea, negative constipation, negative bowel changes, negative heartburn   Genitourinary: negative dysuria, negative hematuria, negative urinary incontinence, negative vaginal discharge, negative vaginal bleeding or spotting  Dermatological: negative rash, negative pruritis, negative mole or other skin changes  Hematologic: negative bruising  Immunologic/Lymphatic: negative recent illness, negative recent sick contact  Musculoskeletal: negative back pain, negative myalgias, negative arthralgias  Neurological:  negative dizziness, negative migraines, negative seizures, negative weakness  Behavior/Psych: negative depression, negative anxiety, negative SI, negative HI    OBSTETRIC HISTORY:   OB History    Para Term  AB Living   4 3 0 3 0 3   SAB IAB Ectopic Molar Multiple Live Births   0 0 0 0 0 3      # Outcome Date GA Lbr Toro/2nd Weight Sex Delivery Anes PTL Lv   4 Current            3  / 34w4d 07:17 / 00:34 5 lb 4.8 oz (2.405 kg) M Vag-Spont None Y SIRISHA      Name: Reynaldo Francois: 8  Apgar5: 9   2  18 34w4d / 00:19 4 lb 8.3 oz (2.05 kg) M Vag-Spont None Y SIRISHA      Name: Ibeth Patino: 8  Apgar5: 9   1  /10 36w2d  4 lb 4 oz (1.928 kg) M Vag-Spont  Y SIRISHA       PAST MEDICAL HISTORY:   has a past medical history of ASCUS (atypical squamous cells of undetermined significance) on Pap smear, Atypical squamous cells of undetermined significance (ASCUS) on Papanicolaou smear of cervix, Chlamydia, Depression, Herpes simplex virus (HSV) infection, LGSIL of cervix of undetermined significance, Postpartum depression, Scoliosis, and Trauma.     PAST SURGICAL HISTORY:   has a past surgical history that includes Colposcopy (13) and [place] tooth extraction (05/2018). ALLERGIES:  has No Known Allergies. MEDICATIONS:  Prior to Admission medications    Medication Sig Start Date End Date Taking? Authorizing Provider   Mom to be Belts MISC by Does not apply route  Patient not taking: No sig reported 1/5/23   Kay Wilkins DO   HYDROXYprogesterone caproate 250 MG/ML OIL oil injection  9/19/22   Historical Provider, MD   ASPIRIN 81 PO Take by mouth daily  Patient not taking: No sig reported    Historical Provider, MD   ondansetron (ZOFRAN-ODT) 4 MG disintegrating tablet Take 1 tablet by mouth 3 times daily as needed for Nausea or Vomiting  Patient not taking: No sig reported 8/31/22   KIMBERLY Pineda CNP   Prenatal Vit-Fe Fumarate-FA (PRENATAL VITAMIN) 27-0.8 MG TABS Take 1 tablet by mouth daily 6/7/22   KIMBERLY Radford CNP       FAMILY HISTORY:  family history includes Asthma in her sister; Cancer in her maternal grandfather; Dementia in her maternal grandmother; High Cholesterol in her mother; No Known Problems in her father and paternal grandfather; Other in her mother and paternal grandmother; Thyroid Disease in her maternal grandmother. SOCIAL HISTORY:   reports that she has never smoked. She has never used smokeless tobacco. She reports that she does not drink alcohol and does not use drugs.     VITALS:  Vitals:    02/13/23 0910 02/13/23 0952   BP: 125/89 (!) 128/104   Pulse: 98 91   Resp:  16   Temp:  97.7 °F (36.5 °C)   TempSrc:  Oral         PHYSICAL EXAM:  Fetal Heart Monitor:  Baseline Heart Rate 140, moderate variability, present accelerations, absent decelerations  [place]: contractions, 2-3    General appearance:  no apparent distress, alert and cooperative  HEENT: head atraumatic, normocephalic, moist mucous membranes, trachea midline  Neurologic:  alert, oriented, normal speech, no focal findings or movement disorder noted  Lungs:  No increased work of breathing, good air exchange, clear to auscultation bilaterally, no crackles or wheezing  Heart:  regular rate and rhythm and no murmur, rubs, gallops  Abdomen:  soft, gravid, non-tender, no rebound, guarding, or rigidity, no RUQ or epigastric tenderness, no signs or symptoms of abruption, no signs or symptoms of chorioamnionitis  Extremities:  no calf tenderness, non edematous, no varicosities, full range of motion in all four extremities  Musculoskeletal: Gross strength equal and intact throughout, no gross abnormalities, range of motion normal in hips, knees, shoulders and spine  Psychiatric: Mood appropriate, normal affect   Rectal Exam: not indicated  Pelvic Exam:   Chaperone for Intimate Exam: Chaperone was present for entire exam, Chaperone Name: Cameron Santacruz RN  Sterile Vaginal Exam:  Cervix: No cervical motion tenderness   Uterus: Is gravid, Normal size, shape, consistency and non-tender  Cervix: Complete dilated, 100 % effaced, +2 station, anterior position (out of 3 station), soft consistency, FETAL POSITION: Cephalic (confirmed by ultrasound), Membranes intact,            LIMITED BEDSIDE US:  Position: Cephalic  Placental Location: anterior  Fetal Heart Tones: Present  Fetal Movement: Present   Amniotic Fluid Index/Volume:  adequate 2x2 cm fluid pocket      PRENATAL LAB RESULTS:   Blood Type/Rh: O pos  Antibody Screen: negative  Hemoglobin, Hematocrit, Platelets: 99.2/51.9/890  Rubella: immune  T.  Pallidum, IgG: non-reactive  Hepatitis B Surface Antigen: non-reactive   Hepatitis C Antibody: not done   HIV: non-reactive   Gonorrhea: negative  Chlamydia: negative  Urine culture: negative, date: 1/18/23    1 hour Glucose Tolerance Test:  Not done    Group B Strep: positive on 2/1/23  Cystic Fibrosis Screen: negative  Sickle Cell Screen: negative  First Trimester Screen: low risk for aneuploidy  MSAFP: Not done   Non-Invasive Prenatal Testing: not done  Anatomy US: Anterior placenta, 3VC, Normal female anatomy    ASSESSMENT & PLAN:  Davian Carmen is a 34 y.o. female [id number] at 37w4d    - GBS positive / Rh positive / R immune   - Pen G for GBS prophylaxis     Precipitous Spontaneous Labor  - ATSO Dr. Guillaume Woody  - VSS, afebrile  - cEFM/ TOCO: Cat 1, contractions q 2-3 min  - SVE: BKTKYUGG/290%/+5  - LBUS: Cephalic  - IVF  ml/hr  - CBC, T&S, Tpal, UDS  - AROM was performed and patient delivered quickly after      Elevated BP x1 ()   - BP normotensive on admission       HSV2   - She denied and lesions or symptoms on admission  - She has never had an genital lesions, was diagnosed based on on antibody testing  -Patient was completely uncomfortable on admission therefore speculum exam was unable to perform      H/O sPTD x3 (G1 @ 36wks; G2 @ 34wks; G3 @ 34wks)   -She was on Stacey injections throughout her pregnancy      Uterine fibroids   - Not seen on US during pregnancy       Depression    -Stable on no medication   -Denies suicidal homicidal ideations     Chlamydia (TOR neg)       Hx of PreE w/o SF   -Blood pressure normotensive on admission  -Denies signs or symptoms of preeclampsia  -She was taking aspirin 81 mg daily      BMI 34    Patient Active Problem List    Diagnosis Date Noted    High-risk pregnancy, unspecified trimester 2023     Priority: Medium    37 weeks gestation of pregnancy 2023     Priority: Medium    High-risk pregnancy, third trimester  T4 free normal  2023     Priority: Medium     T4 Free normal lab results 23       Flu and Tdap given 22     Priority: Medium    GBS (group B Streptococcus carrier), +RV culture, currently pregnant 2023     Will need abx in labor      Elevated BP without diagnosis of hypertension 2023 Patient had elevated BP at prenatal appointment, preE labs wnl, P/C 0.13      Hx of PreE without SF 2022     G3 postpartum        18 M Apg 8/9 Wt 5#4 2019     Spontaneous  delivery @ 34w4d      Obesity affecting pregnancy in first trimester 2019     ASA 81 mg/d      History of  delivery, currently pregnant 2019    Uterine fibroids 10/31/2017    H/O sPTD x3 (G1 @ 36wks; G2 @ 34wks; G3 @ 34wks) 2017     U/S q 2 weeks from 16-34 weeks, [place] until 36.6 weeks. 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 Had negative culture. Reports she has never had a genital lesion in the past.       ASCUS (atypical squamous cells of undetermined significance) on Pap smear     Depression 2010     prior to del 2010         Plan discussed with Dr. Fransisca Stewart, who is agreeable. Steroids given this admission: No    Risks, benefits, alternatives and possible complications have been discussed in detail with the patient. Admission, and post admission procedures and expectations were discussed in detail. All questions were answered.     Attending's Name: Dr. Veronica Corey DO  Ob/Gyn Resident  2023, 9:18 AM

## 2023-02-13 NOTE — LACTATION NOTE
Mom reports her baby fed well and comfortably. Reviewed feeding patterns in the first 2 days. Baby was sleeping. Encouraged mom to get her rest. She will call out for assistance as needed.

## 2023-02-13 NOTE — TELEPHONE ENCOUNTER
Patient called stating she is on the way to the hospital.  She is having contractions every 2-3 minutes.

## 2023-02-13 NOTE — FLOWSHEET NOTE
Pt presents [facility], accompanied by mother Ariane Frost, viaambulatory. Pt here for ctx, Dr. Giana Hi in triage Pt compelete with a bulging bag. Pt moved to 704. Pt gowned and in bed, oriented to room and call light. EFM explained and applied. FHT's 140s.

## 2023-02-13 NOTE — L&D DELIVERY NOTE
Mother's Information      Labor Events      Cervical Ripening:   Now               Jessica De La O [[id number]]      Labor Events      Cervical Ripening Date/Time:       Rupture Date/Time: 23 09:21:00   Rupture Type: AROM  Fluid Color: Clear  Fluid Odor: None       Anesthesia           Start Pushing      Labor onset date/time:   Now     Dilation complete date/time:   Now     Start pushing date/time: 2023 09:20:00   Decision date/time (emergent ):           Delivery ()      Delivery Date/Time:  23 09:41:00       Details:            Shoulder Dystocia    Add Second Maneuver  Add Third Maneuver  Add Fourth Maneuver  Add Fifth Maneuver  Add Sixth Maneuver  Add Seventh Maneuver  Add Eighth Maneuver  Add Ninth Maneuver       Assisted Delivery Details           Document Additional Attempt         Document Additional Attempt                 Cord           Placenta    Date/Time: 2023 09:45:00  Removal: Spontaneous  Appearance: Intact       Lacerations    Episiotomy: None  Perineal Lacerations: None  Other Lacerations: no non-perineal laceration       Vaginal Counts        Sponges Needles Instruments   Initial Counts      Final Counts      If the count is incorrect due to Intentionally Retained Foreign Object (IRFO) add the IRFO LDA in Lines/Drains.   Add LDA: Link to [facility]       Blood Loss  Mother: Eva Netter #[id number]     Start of Mother's Information      Delivery Blood Loss  23 2141 - 23 0959      None                 End of Mother's Information  Mother: Eva Netter #[id number]                Delivery Providers    Delivering clinician: Javier Carpenter DO     Provider Role    Javier Carpenter DO Obstetrician    Ulisses Marrero RN Primary Nurse    Matt Fine RN Primary [place] Nurse     NICU Nurse     Neonatologist     Anesthesiologist     Nurse Anesthetist     Nurse Practitioner     Midwife     Nursery Nurse    Alonzo Cord[place], [id number] [address] Marco Irving DO               [place]enoia Assessment    Living Status: Living  Delivery Location Comment: 704     Apgar Scoring Key:    0 1 2    Skin Color: Blue or pale Acrocyanotic Completely pink    Heart Rate: Absent <100 bpm >100 bpm    Reflex Irritability: No response Grimace Cry or active withdrawal    Muscle Tone: Limp Some flexion Active motion    Respiratory Effort: Absent Weak cry; hypoventilation Good, crying                      Skin Color:   Heart Rate:   Reflex Irritability:   Muscle Tone:   Respiratory Effort: Total:            1 Minute:    0    2    2    2    2    8        Apgar 1 total from OB History    5 Minute:    1    2    2    2    2    9        Apgar 5 total from OB History    10 Minute:              15 Minute:              20 Minute:                        Apgars Assigned Negra Torres RN              Resuscitation    Method: Bulb Suction, Room Air, Stimulation              Measurements               Title      Skin to Skin Initiation Date/Time:       Skin to Skin End Date/Time:                    Vaginal Delivery Note  Department of Obstetrics and Gynecology  [facility]       Patient: Syed Torres   : 1990  MRN: [id number]   Date of delivery: 23     Pre-operative Diagnosis: Syed Torres [id number] at 37w4d  Spontaneous Labor  Elevated BP  HSV2  Hx  delivery x 3  Depression   Hx of PreE w/o SF  BMI 34    Post-operative Diagnosis:  same as above and  living infant female    Delivering Obstetrician & Assistant(s): Dr. Guillaume Woody, Dr. Marco Irving; Soumya Lui DO    Infant Information:   Information for the patient's :  Jomar Cox [[id number]]      Information for the patient's :  Jomar Cox [[id number]]        Apgar scores: 8 at 1 minute and 9 at 5 minutes. Anesthesia:  none    Application and Delivery:    She was known to be GBS positive and did not receive antibiotic prophylaxis.      The patient progressed well, became complete and felt the urge to push. After pushing with contractions the fetal head delivered Cephalic, right occiput posterior over an intact perineum, nuchal cord was not present. The anterior, then posterior shoulder delivered easily and atraumatically followed by the rest of the infant. Nose and mouth suctioned with bulb suction, infant was stimulated and dried. Cord was clamped and cut after one minute delayed cord clamping and infant was placed on maternal abdomen,  and attended by RN for evaluation. The delivery of the placenta was spontaneous and appeared intact, whole, and that the umbilical cord had three vessels noted. Pitocin was started. The vagina was swept of all clots and debris. The perineum and vagina were evaluated and no  laceration was found. Mother and baby tolerated procedure well. Dr. Chiqui Santo was present for entire delivery.     Delivery Summary:       Specimen: cord blood and cord gases  Quantitative blood loss:  50ml immediately following delivery  Condition:  infant stable to general nursery and mother stable  Counts: instrument and sponge counts correct  Blood Type and Rh: Ægissidu 64, DO  Ob/Gyn Resident  2/13/2023, 9:59 AM

## 2023-02-13 NOTE — FLOWSHEET NOTE
Dr. Delmy Eli at bedside to assess bleeding. Several large clots  Pt tried to urinte with minimal urine out. Methergine given.    Trickle continue  1119 Ultrasound performed, dr Samantha Koenig at bedside

## 2023-02-13 NOTE — PROGRESS NOTES
Ob/Gyn Interval Note     Was called to patient room by RN. RN had noted that patient has had a constant trickle of blood since delivery. Patient's vital signs stable. She denies any lightheadedness, dizziness, chest pain or shortness of breath. Patient had not voided since prior to delivery and due to stability of bleeding at this time recommended attempt to void. Unfortunately patient was unable. Prior to SVE IV morphine 4 mg was given. SVE performed and bogginess to lower uterine segment with clots noted. Clots were evacuated without difficulty. Patient was given IM Methergine 0.2 mg x1. Bedside ultrasound performed and blood clots noted within the uterus but no retained POC's noted. Bleeding noted to be stable at this time. Continue postpartum Pitocin. We will monitor patient closely. QBL following delivery was 50 ml. We will continue to weigh pads. Total blood loss at this time is 130 mL.     Attending updated in agreement with plan    Vitals:    02/13/23 0952 02/13/23 1001 02/13/23 1016 02/13/23 1031   BP: (!) 128/104 124/84 130/82 139/89   Pulse: 91 92 82 75   Resp: 16 16 16 16   Temp: 97.7 °F (36.5 °C)      TempSrc: Oral          Recent Results (from the past 6 hour(s))   CBC auto differential    Collection Time: 02/13/23  9:25 AM   Result Value Ref Range    WBC 15.6 (H) 3.5 - 11.3 k/uL    RBC 4.92 3.95 - 5.11 m/uL    Hemoglobin 13.3 11.9 - 15.1 g/dL    Hematocrit 40.2 36.3 - 47.1 %    MCV 81.7 (L) 82.6 - 102.9 fL    MCH 27.0 25.2 - 33.5 pg    MCHC 33.1 28.4 - 34.8 g/dL    RDW 13.5 11.8 - 14.4 %    Platelets 076 751 - 214 k/uL    MPV 11.8 8.1 - 13.5 fL    NRBC Automated 0.0 0.0 per 100 WBC    Immature Granulocytes 0 0 %    Seg Neutrophils 75 (H) 36 - 66 %    Lymphocytes 18 (L) 24 - 44 %    Monocytes 7 1 - 7 %    Eosinophils % 0 (L) 1 - 4 %    Basophils 0 0 - 2 %    Absolute Immature Granulocyte 0.00 0.00 - 0.30 k/uL    Segs Absolute 11.70 (H) 1.8 - 7.7 k/uL    Absolute Lymph # 2.81 1.0 - 4.8 k/uL    Absolute Mono # 1.09 (H) 0.1 - 0.8 k/uL    Absolute Eos # 0.00 0.0 - 0.4 k/uL    Basophils Absolute 0.00 0.0 - 0.2 k/uL    Morphology MICROCYTOSIS PRESENT    TYPE AND SCREEN    Collection Time: 02/13/23  9:25 AM   Result Value Ref Range    Expiration Date 02/16/2023,2359     Arm Band Number BE [id number]     ABO/Rh O POSITIVE     Antibody Screen NEGATIVE    T. pallidum Ab    Collection Time: 02/13/23  9:25 AM   Result Value Ref Range    T. pallidum, IgG NONREACTIVE NONREACTIVE   ]      Julieta Alvarez  OB/GYN Resident  [address]   2/13/2023 11:24 AM

## 2023-02-14 PROCEDURE — 1220000000 HC SEMI PRIVATE OB R&B

## 2023-02-14 PROCEDURE — 99024 POSTOP FOLLOW-UP VISIT: CPT | Performed by: OBSTETRICS & GYNECOLOGY

## 2023-02-14 PROCEDURE — 6370000000 HC RX 637 (ALT 250 FOR IP): Performed by: STUDENT IN AN ORGANIZED HEALTH CARE EDUCATION/TRAINING PROGRAM

## 2023-02-14 RX ADMIN — MAGNESIUM HYDROXIDE 30 ML: 400 SUSPENSION ORAL at 21:15

## 2023-02-14 RX ADMIN — IBUPROFEN 600 MG: 600 TABLET, FILM COATED ORAL at 08:02

## 2023-02-14 RX ADMIN — IBUPROFEN 600 MG: 600 TABLET, FILM COATED ORAL at 14:09

## 2023-02-14 RX ADMIN — IBUPROFEN 600 MG: 600 TABLET, FILM COATED ORAL at 21:12

## 2023-02-14 RX ADMIN — ACETAMINOPHEN 1000 MG: 500 TABLET ORAL at 12:20

## 2023-02-14 RX ADMIN — DOCUSATE SODIUM 100 MG: 100 CAPSULE ORAL at 08:02

## 2023-02-14 RX ADMIN — ACETAMINOPHEN 1000 MG: 500 TABLET ORAL at 05:54

## 2023-02-14 RX ADMIN — DOCUSATE SODIUM 100 MG: 100 CAPSULE ORAL at 21:12

## 2023-02-14 RX ADMIN — ACETAMINOPHEN 1000 MG: 500 TABLET ORAL at 18:43

## 2023-02-14 ASSESSMENT — PAIN SCALES - GENERAL
PAINLEVEL_OUTOF10: 7
PAINLEVEL_OUTOF10: 0
PAINLEVEL_OUTOF10: 8
PAINLEVEL_OUTOF10: 7
PAINLEVEL_OUTOF10: 7
PAINLEVEL_OUTOF10: 4

## 2023-02-14 ASSESSMENT — PAIN DESCRIPTION - LOCATION
LOCATION: ABDOMEN

## 2023-02-14 ASSESSMENT — PAIN DESCRIPTION - DESCRIPTORS
DESCRIPTORS: CRAMPING
DESCRIPTORS: CRAMPING

## 2023-02-14 NOTE — PROGRESS NOTES
POST PARTUM DAY # 1    Marquis Machado is a 34 y.o. female  This patient was seen & examined today. Her pregnancy was complicated by:   Patient Active Problem List   Diagnosis    ASCUS (atypical squamous cells of undetermined significance) on Pap smear    HSV2    H/O sPTD x3 (G1 @ 36wks; G2 @ 34wks; G3 @ 34wks)    Uterine fibroids    Depression    Chlamydia (TOR neg)    Obesity affecting pregnancy in first trimester    History of  delivery, currently pregnant     18 M Apg 8/9 Wt 5#4    Hx of PreE without SF    Flu and Tdap given 22    Elevated BP without diagnosis of hypertension    High-risk pregnancy, third trimester  T4 free normal     GBS (group B Streptococcus carrier), +RV culture, currently pregnant    High-risk pregnancy, unspecified trimester    37 weeks gestation of pregnancy     22 F Apg 8/9 Wt 6#9       Today she is doing well without any chief complaint. Her lochia is moderate. She denies chest pain, shortness of breath, headache, peripheral edema, palpitations, and dry cough. She is  breast feeding and she denies any breast tenderness. She is ambulating well. Her voiding pattern is normal. I reviewed signs and symptoms of post partum depression with the patient, she currently denies any of these symptoms. She is tolerating solids.      Vital Signs:  Vitals:    23   BP: 122/80   Pulse: 89   Resp: 14   Temp: 98.8 °F (37.1 °C)   SpO2: 98%         Physical Exam:  General:  awake, alert, cooperative, no apparent distress, and appears stated age, no apparent distress, alert, and cooperative  Neurologic:  alert, oriented, normal speech, no focal findings or movement disorder noted  Lungs:  No increased work of breathing, good air exchange, clear to auscultation bilaterally, no crackles or wheezing  Heart:  Normal apical impulse, regular rate and rhythm, normal S1 and S2, no S3 or S4, and no murmur noted and regular rate and rhythm    Abdomen: Abdomen soft, non-tender. BS normal. No masses,  No organomegaly, abdomen soft, non-distended, non-tender  Fundus: non-tender, normal size, firm, below umbilicus  Extremities:  no calf tenderness, non edematous    Lab:  Lab Results   Component Value Date    HGB 13.3 2023     Lab Results   Component Value Date    HCT 40.2 2023       Assessment/Plan:  Jalyn Fregoso is a [id number] PPD # 1 s/p    - Doing well, VSS   - female infant in [address]   - Encourage ambulation   - Postpartum Hgb/Hct completed - Hgb  13.3  Rh positive/Rubella immune  Breast feeding   History of depression         - History of depression as well as postpartum depression. Patient is denying any depressive symptoms at this time. She is not taking any medication. She requires an outpatient PCP to manage this for her. Birth control          -Declines any birth control at this time  Elevated blood pressure         - Appropriate BP levels without any medications. Continue post partum care      Counseling Completed:  Secondary Smoke risks and Sudden Infant Death Syndrome were reviewed with recommendations. Infant sleeping, \"back to sleep\" and avoidance of co-sleeping recommendations were reviewed. Signs and Symptoms of Post Partum Depression were reviewed. The patient is to call if any occur. Signs and symptoms of Mastitis were reviewed. The patient is to call if any occur for follow up. Discharge instructions including pelvic rest, no driving with pain medicine and office follow-up were reviewed with patient     Attending Physician: Dr. Albina Larose MD  Ob/Gyn Resident   2023, 12:28 AM        Attending Physician Statement  I have discussed the care of Jalyn Fregoso, including pertinent history and exam findings,  with the resident. I have reviewed the key elements of all parts of the encounter with the resident. I agree with the assessment, plan and orders as documented by the resident.   (GC Modifier)    Pt feels well except for cramps with breastfeeding. Desires discharge tomorrow when baby is cleared by peds (being monitored x 48 hours due to GBS with inadequate treatment).     Electronically signed by Chong Ayon MD  2/14/2023  9:04 AM

## 2023-02-14 NOTE — PROGRESS NOTES
Attending Physician Statement  I have discussed the care of Alexis Schneider, including pertinent history and exam findings,  with the resident. I have reviewed the key elements of all parts of the encounter with the resident. I agree with the assessment, plan and orders as documented by the resident.   (GE Modifier)    Edmonia End, DO

## 2023-02-14 NOTE — CARE COORDINATION
Social Work     Sw reviewed medical record (current active problem list) and tox screens and found no current concerns. Sw spoke with mom briefly to explain Sw role, inquire if any needs or concerns, and provide safe sleep education and discuss. Mom denied any needs or questions and informs baby has a safe sleep environment. (Crib)     Mom denied any current s/s of anxiety or depression and is aware to reach out to [facility] if any s/s occur after dc. Mom reports that she would feel comfortable reaching out to [facility] as needed. Mom reports a good support system and denied any current questions or needs. Mom reports this is her 4th child (15, 11, 3). Mom reports that she resides with her mother, father, and 3 children who are all excited for baby. Mom states ped will be [facility]. Mom reports that she is currently connected with WIC and SNAP. Sw did provide mom with [employer] ([employer] Program) flyer so she is aware of this new free resource in state of [place]. Sw encouraged mom to reach out if any issues or concerns arise.         Shawanda Intern Rosa Galo

## 2023-02-14 NOTE — LACTATION NOTE
Lactation round made. Mother reports breastfeeding is going well. Baby is due to eat. RN assisting with waking baby. Baby placed skin to skin with mother on right side. Colostrum expressed and offered to baby. Baby latches, mother reports strong tug. Lips flanged. Mother has a breast pump. Starting a new job in a [address] unsure if she will have a place to pump. Goes to [facility] on [address].     Baby is voiding and stooling. Weight loss 2.84%, bilirubin 3.4.

## 2023-02-14 NOTE — CARE COORDINATION
CASE MANAGEMENT POST-PARTUM TRANSITIONAL CARE PLAN    High-risk pregnancy, unspecified trimester [O09.90]  37 weeks gestation of pregnancy [Z3A.37]    OB Provider: [address] [place] [address] [address]    Writer met w/ Estelita Roy at bedside to discuss DCP. She is S/P  on 23 @ 37w4d @ 1 of female infant. Writer verified name/address/phone number correct on facesheet. She states she lives with her parents and her children. Estelita Roy verbalized no difficulties with transportation to and from doctors appointments or with paying for medications upon discharge home. [place] Medicaid insurance correct. Writer notified Estelita Roy she has 30 days from date of birth to add  to insurance policy by contacting [address] [place] [address]. She verbalized understanding. Estelita Friedmane confirmed a safe place for infant to sleep at home. Infant name on BC: Chele Tan. Infant PCP Dr Jairo Goyal.      DME: none  HOME CARE: had optum for progesterone injects til 36 weeks    Anticipate DC of couplet 2/15/23    Readmission Risk              Risk of Unplanned Readmission:  5

## 2023-02-15 VITALS
OXYGEN SATURATION: 97 % | DIASTOLIC BLOOD PRESSURE: 79 MMHG | TEMPERATURE: 98 F | HEART RATE: 77 BPM | RESPIRATION RATE: 20 BRPM | SYSTOLIC BLOOD PRESSURE: 113 MMHG

## 2023-02-15 PROCEDURE — 6370000000 HC RX 637 (ALT 250 FOR IP): Performed by: STUDENT IN AN ORGANIZED HEALTH CARE EDUCATION/TRAINING PROGRAM

## 2023-02-15 RX ADMIN — DOCUSATE SODIUM 100 MG: 100 CAPSULE ORAL at 08:08

## 2023-02-15 RX ADMIN — ACETAMINOPHEN 1000 MG: 500 TABLET ORAL at 06:10

## 2023-02-15 RX ADMIN — IBUPROFEN 600 MG: 600 TABLET, FILM COATED ORAL at 06:10

## 2023-02-15 NOTE — PROGRESS NOTES
POST PARTUM DAY # 2    Shawn Dow is a 34 y.o. female  This patient was seen & examined today. Her pregnancy was complicated by:   Patient Active Problem List   Diagnosis    ASCUS (atypical squamous cells of undetermined significance) on Pap smear    HSV2    H/O sPTD x3 (G1 @ 36wks; G2 @ 34wks; G3 @ 34wks)    Uterine fibroids    Depression    Chlamydia (TOR neg)    Obesity affecting pregnancy in first trimester    History of  delivery, currently pregnant     18 M Apg 8/9 Wt 5#4    Hx of PreE without SF    Flu and Tdap given 22    Elevated BP without diagnosis of hypertension    High-risk pregnancy, third trimester  T4 free normal     GBS (group B Streptococcus carrier), +RV culture, currently pregnant    High-risk pregnancy, unspecified trimester    37 weeks gestation of pregnancy     23 F Apg 8/9 Wt 6#9       Today she is doing well without any chief complaint. Her lochia is light. She denies chest pain, shortness of breath, headache, lightheadedness, blurred vision, peripheral edema, and palpitations. She is  breast feeding and she denies any breast tenderness. She is ambulating well. Her voiding pattern is normal. I reviewed signs and symptoms of post partum depression with the patient, she currently denies any of these symptoms. She is tolerating solids.      Vital Signs:  Vitals:    23 0836 23 1152 23 1558 23   BP: 118/77 118/79 115/78 122/85   Pulse: 89 70 89 91   Resp: 16 16 16 16   Temp: 98.1 °F (36.7 °C) 98.5 °F (36.9 °C) 99.1 °F (37.3 °C) 98.7 °F (37.1 °C)   TempSrc: Oral Oral  Oral   SpO2: 98% 98%  99%         Physical Exam:  General:  no apparent distress, alert, and cooperative  Neurologic:  alert, oriented, normal speech, no focal findings or movement disorder noted  Lungs:  No increased work of breathing, good air exchange, clear to auscultation bilaterally, no crackles or wheezing  Heart:  regular rate and rhythm    Abdomen: abdomen soft, non-distended, non-tender  Fundus: non-tender, normal size, firm, below umbilicus  Extremities:  no calf tenderness, non edematous    Lab:  Lab Results   Component Value Date    HGB 13.3 2023     Lab Results   Component Value Date    HCT 40.2 2023       Assessment/Plan:  Bob Parson is a [id number] PPD # 2 s/p    - Doing well, VSS   - Female infant in [address]   - Encourage ambulation   - Postpartum Hgb/Hct if symptomatic    - Pain controlled with Motrin/Tylenol    - Patient desires discharge     Rh positive/Rubella immune   - Rhogam/MMR not indicated     Breast Feeding   - Denies sxs mastitis     Elevated BP x 2   - Patient has not yet met criteria for gHTN diagnosis    - Denies sxs PreE    Depression    - EPDS 11    - Patient counseled on & denies s/sx post partum depression    - Patient's mood stable on no medications    - Patient declines medication at this time    - Denies SI/HI   - Will continue to monitor PP     BMI 34    Continue post partum care    Counseling Completed:  Secondary Smoke risks and Sudden Infant Death Syndrome were reviewed with recommendations. Infant sleeping, \"back to sleep\" and avoidance of co-sleeping recommendations were reviewed. Signs and Symptoms of Post Partum Depression were reviewed. The patient is to call if any occur. Signs and symptoms of Mastitis were reviewed. The patient is to call if any occur for follow up. Discharge instructions including pelvic rest, no driving with pain medicine and office follow-up were reviewed with patient     Attending Physician: Dr. Caridad Walters,   Ob/Gyn Resident   2/15/2023, 12:41 AM        Attending Physician Statement  I have discussed the care of Bob Parson, including pertinent history and exam findings,  with the resident. I have reviewed the key elements of all parts of the encounter with the resident. I agree with the assessment, plan and orders as documented by the resident.   (GC Modifier)    Electronically signed by Jonna Galindo DO  2/15/2023  10:11 AM

## 2023-02-15 NOTE — PLAN OF CARE
Problem: Pain  Goal: Verbalizes/displays adequate comfort level or baseline comfort level  2/15/2023 1000 by Pennie Pineda RN  Outcome: Completed  2/14/2023 2029 by Nataile Bedoya RN  Outcome: Progressing

## 2023-02-15 NOTE — PLAN OF CARE
Problem: Pain  Goal: Verbalizes/displays adequate comfort level or baseline comfort level  2/14/2023 2029 by Raymundo Quiñones RN  Outcome: Progressing

## 2023-02-15 NOTE — FLOWSHEET NOTE
I have reviewed all [facility] Post-Birth Warning Signs and essential teaching points for pulmonary embolism, cardiac disease, hypertensive disorders of pregnancy, obstetric hemorrhage, venous thromboembolism, infection, and postpartum depression with the patient  . I have informed the patient on when to call their healthcare provider and when to call 911. I have discussed with the patient  the importance of scheduling a follow-up visit with their physician, nurse practitioner or midwife and provided them with correct contact information for appointment. I have provided the patient with a copy of the \"Save Your Life\" handout. The patient has acknowledged receiving and understanding this education with her signature. ID bands checked. Discharge teaching complete, discharge instructions signed, & parent/guardian denies questions regarding infant care at time of discharge. Parents  verbalized understanding to follow-up with the pediatrician or family physician as  recommended on the discharge instructions. Mother verbalizes understanding to follow-up with babys care provider as instructed. Discharged in stable  condition to care of parents. Infant placed in rear facing car seat per parents. Discharged per [facility] to home with family and baby in car seat.

## 2023-02-21 RX ORDER — PNV NO.95/FERROUS FUM/FOLIC AC 28MG-0.8MG
1 TABLET ORAL DAILY
Qty: 30 TABLET | Refills: 11 | Status: SHIPPED | OUTPATIENT
Start: 2023-02-21 | End: 2023-03-23

## 2023-02-21 NOTE — TELEPHONE ENCOUNTER
Patient is breast feeding and lost prenatal vitamin script. She would like a new script sent to [facility].   Order pended

## 2023-04-04 ENCOUNTER — POSTPARTUM VISIT (OUTPATIENT)
Dept: OBGYN | Age: 33
End: 2023-04-04
Payer: MEDICAID

## 2023-04-04 VITALS
HEIGHT: 62 IN | HEART RATE: 81 BPM | WEIGHT: 184.6 LBS | BODY MASS INDEX: 33.97 KG/M2 | SYSTOLIC BLOOD PRESSURE: 118 MMHG | DIASTOLIC BLOOD PRESSURE: 78 MMHG

## 2023-04-04 DIAGNOSIS — K59.00 CONSTIPATION, UNSPECIFIED CONSTIPATION TYPE: ICD-10-CM

## 2023-04-04 PROBLEM — O09.899 HISTORY OF PRETERM DELIVERY, CURRENTLY PREGNANT: Status: RESOLVED | Noted: 2019-02-18 | Resolved: 2023-04-04

## 2023-04-04 PROBLEM — Z87.59 HISTORY OF PRE-ECLAMPSIA: Status: RESOLVED | Noted: 2022-08-24 | Resolved: 2023-04-04

## 2023-04-04 PROBLEM — O09.90 HIGH-RISK PREGNANCY, UNSPECIFIED TRIMESTER: Status: RESOLVED | Noted: 2023-02-13 | Resolved: 2023-04-04

## 2023-04-04 PROBLEM — O09.93 HIGH-RISK PREGNANCY, THIRD TRIMESTER: Status: RESOLVED | Noted: 2023-01-06 | Resolved: 2023-04-04

## 2023-04-04 PROBLEM — R03.0 ELEVATED BP WITHOUT DIAGNOSIS OF HYPERTENSION: Status: RESOLVED | Noted: 2023-01-05 | Resolved: 2023-04-04

## 2023-04-04 PROBLEM — Z3A.37 37 WEEKS GESTATION OF PREGNANCY: Status: RESOLVED | Noted: 2023-02-13 | Resolved: 2023-04-04

## 2023-04-04 PROBLEM — Z23 INFLUENZA VACCINE ADMINISTERED: Status: RESOLVED | Noted: 2022-12-22 | Resolved: 2023-04-04

## 2023-04-04 PROBLEM — O99.211 OBESITY AFFECTING PREGNANCY IN FIRST TRIMESTER: Status: RESOLVED | Noted: 2019-02-18 | Resolved: 2023-04-04

## 2023-04-04 PROBLEM — O99.820 GBS (GROUP B STREPTOCOCCUS CARRIER), +RV CULTURE, CURRENTLY PREGNANT: Status: RESOLVED | Noted: 2023-02-06 | Resolved: 2023-04-04

## 2023-04-04 PROCEDURE — 99211 OFF/OP EST MAY X REQ PHY/QHP: CPT

## 2023-04-04 RX ORDER — DOCUSATE SODIUM 100 MG/1
100 CAPSULE, LIQUID FILLED ORAL 2 TIMES DAILY
Qty: 60 CAPSULE | Refills: 3 | Status: SHIPPED | OUTPATIENT
Start: 2023-04-04 | End: 2023-05-04

## 2023-04-04 RX ORDER — GLYCERIN/MINERAL OIL
1 LOTION (ML) TOPICAL DAILY
Qty: 30 TABLET | Refills: 12 | Status: SHIPPED | OUTPATIENT
Start: 2023-04-04

## 2023-04-04 NOTE — PROGRESS NOTES
Attending Physician Statement  I have personally discussed the care of Fede Weldon, including pertinent history and exam findings with the resident. I have reviewed and edited their note in the electronic medical record as indicated. The key elements of all parts of the encounter have been performed/reviewed by me. I agree with the assessment, plan and orders as documented by the resident.        Attending's Name:  Canelo Singleton MD
patients who contract COVID-19- patient declined today   - Gardisil vaccination: patient states that she thinks she got one dose at age 24. Record of vaccination not in the immunization tab. She has a needle phobia and would like to think about getting the vaccine. Encouraged her to call the office to schedule a nursing visit if she ultimately desires the HPV vaccine   - Last pap smear: 2022, NILM, HPV neg   - Indications for 2hr 75g GTT: NA as 1 hr gtt wnl during pregnancy    Patient Active Problem List    Diagnosis Date Noted     22 F Apg 8/9 Wt 6#9 2023     Priority: High    High-risk pregnancy, unspecified trimester 2023     Priority: Medium    High-risk pregnancy, third trimester  T4 free normal  2023     Priority: Medium     T4 Free normal lab results 23       Flu and Tdap given 22     Priority: Medium    GBS (group B Streptococcus carrier), +RV culture, currently pregnant 2023     Will need abx in labor      Elevated BP without diagnosis of hypertension 2023 Patient had elevated BP at prenatal appointment, preE labs wnl, P/C 0.13      Hx of PreE without SF 2022     G3 postpartum        18 M Apg 8/9 Wt 5#4 2019     Spontaneous  delivery @ 34w4d      Obesity affecting pregnancy in first trimester 2019     ASA 81 mg/d      History of  delivery, currently pregnant 2019    Uterine fibroids 10/31/2017    H/O sPTD x3 (G1 @ 36wks; G2 @ 34wks; G3 @ 34wks) 2017     U/S q 2 weeks from 16-34 weeks, [place] until 36.6 weeks. 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 Had negative culture.  Reports she has never had a

## 2023-06-30 ENCOUNTER — TELEPHONE (OUTPATIENT)
Dept: FAMILY MEDICINE CLINIC | Age: 33
End: 2023-06-30

## 2023-06-30 DIAGNOSIS — Z00.00 HEALTH CARE MAINTENANCE: Primary | ICD-10-CM

## 2023-06-30 NOTE — TELEPHONE ENCOUNTER
Writer called Pt. Left message for Pt to call the office. Pt return call, messages was given per Dr. Yady Barrientos. Thank You.

## 2024-07-13 ENCOUNTER — HOSPITAL ENCOUNTER (EMERGENCY)
Facility: CLINIC | Age: 34
Discharge: HOME | End: 2024-07-13
Attending: EMERGENCY MEDICINE
Payer: MEDICAID

## 2024-07-13 VITALS
WEIGHT: 198 LBS | BODY MASS INDEX: 36.44 KG/M2 | HEIGHT: 62 IN | RESPIRATION RATE: 16 BRPM | SYSTOLIC BLOOD PRESSURE: 116 MMHG | HEART RATE: 77 BPM | OXYGEN SATURATION: 99 % | TEMPERATURE: 98.7 F | DIASTOLIC BLOOD PRESSURE: 78 MMHG

## 2024-07-13 DIAGNOSIS — S46.811A STRAIN OF RIGHT TRAPEZIUS MUSCLE, INITIAL ENCOUNTER: Primary | ICD-10-CM

## 2024-07-13 PROCEDURE — 99284 EMERGENCY DEPT VISIT MOD MDM: CPT

## 2024-07-13 PROCEDURE — 96372 THER/PROPH/DIAG INJ SC/IM: CPT

## 2024-07-13 PROCEDURE — 6370000000 HC RX 637 (ALT 250 FOR IP): Performed by: REGISTERED NURSE

## 2024-07-13 PROCEDURE — 6360000002 HC RX W HCPCS: Performed by: REGISTERED NURSE

## 2024-07-13 RX ORDER — KETOROLAC TROMETHAMINE 30 MG/ML
30 INJECTION, SOLUTION INTRAMUSCULAR; INTRAVENOUS ONCE
Status: COMPLETED | OUTPATIENT
Start: 2024-07-13 | End: 2024-07-13

## 2024-07-13 RX ORDER — CYCLOBENZAPRINE HCL 10 MG
10 TABLET ORAL 3 TIMES DAILY PRN
Qty: 21 TABLET | Refills: 0 | Status: SHIPPED | OUTPATIENT
Start: 2024-07-13 | End: 2024-07-23

## 2024-07-13 RX ORDER — LIDOCAINE 50 MG/G
1 PATCH TOPICAL DAILY
Qty: 10 PATCH | Refills: 0 | Status: SHIPPED | OUTPATIENT
Start: 2024-07-13 | End: 2024-07-23

## 2024-07-13 RX ORDER — CYCLOBENZAPRINE HCL 10 MG
10 TABLET ORAL ONCE
Status: COMPLETED | OUTPATIENT
Start: 2024-07-13 | End: 2024-07-13

## 2024-07-13 RX ADMIN — KETOROLAC TROMETHAMINE 30 MG: 30 INJECTION, SOLUTION INTRAMUSCULAR at 19:44

## 2024-07-13 RX ADMIN — CYCLOBENZAPRINE 10 MG: 10 TABLET, FILM COATED ORAL at 19:44

## 2024-07-13 ASSESSMENT — PAIN DESCRIPTION - ORIENTATION: ORIENTATION: RIGHT

## 2024-07-13 ASSESSMENT — PAIN DESCRIPTION - PAIN TYPE: TYPE: ACUTE PAIN

## 2024-07-13 ASSESSMENT — ENCOUNTER SYMPTOMS
DIARRHEA: 0
SHORTNESS OF BREATH: 0
NAUSEA: 0
BACK PAIN: 0
VOMITING: 0
COUGH: 0
ABDOMINAL PAIN: 0

## 2024-07-13 ASSESSMENT — PAIN SCALES - GENERAL: PAINLEVEL_OUTOF10: 10

## 2024-07-13 ASSESSMENT — PAIN DESCRIPTION - FREQUENCY: FREQUENCY: CONTINUOUS

## 2024-07-13 ASSESSMENT — PAIN DESCRIPTION - LOCATION: LOCATION: NECK

## 2024-07-13 ASSESSMENT — PAIN DESCRIPTION - ONSET: ONSET: SUDDEN

## 2024-07-13 ASSESSMENT — PAIN DESCRIPTION - DESCRIPTORS: DESCRIPTORS: SHARP

## 2024-07-13 NOTE — ED NOTES
Pt presents to ED ambulatory a&o x4. Pt states yesterday at work she was having a little R sided neck pain. Pt states she went to bed fine. After a little part of the morning she was in the car and turned her head and started to have severe R sided neck pain. Denies injury.

## 2024-07-13 NOTE — DISCHARGE INSTRUCTIONS
Please understand that at this time there is no evidence for a more serious underlying process, but that early in the process of an illness or injury, an emergency department workup can be falsely reassuring.  You should contact your family doctor within the next 48 hours for a follow up appointment    THANK YOU!!!    From [facility] and [facility]    On behalf of the Emergency Department staff at [facility], I would like to thank you for giving us the opportunity to address your health care needs and concerns.    We hope that during your visit, our service was delivered in a professional and caring manner. Please keep [facility] in mind as we walk with you down the path to your own personal wellness.     Please expect an automated text message or email from us so we can ask a few questions about your health and progress. Based on your answers, a clinician may call you back to offer help and instructions.    Please understand that early in the process of an illness or injury, an emergency department workup can be falsely reassuring.  If you notice any worsening, changing or persistent symptoms please call your family doctor or return to the ER immediately.     Tell us how we did during your visit at [email or web address]   and let us know about your experience

## 2024-07-14 NOTE — ED PROVIDER NOTES
[facility]  eMERGENCY dEPARTMENT eNCOUnter   Independent Attestation     Pt Name: Linda Delgadillo  MRN: [id number]  Birthdate 1990  Date of evaluation: 7/13/24       Linda Delgadillo is a 33 y.o. female who presents with Neck Pain        Based on the medical record, the care appears appropriate. I was personally available for consultation in the Emergency Department.    Suzie Hilario DO  Attending Emergency  Physician               Suzie Hilario DO  07/13/24 1956    
EKG):  No orders of the defined types were placed in this encounter.      MEDICATIONS ORDERED:  Orders Placed This Encounter   Medications    ketorolac (TORADOL) injection 30 mg    cyclobenzaprine (FLEXERIL) tablet 10 mg    lidocaine (LIDODERM) 5 %     Sig: Place 1 patch onto the skin daily for 10 days 12 hours on, 12 hours off.     Dispense:  10 patch     Refill:  0    cyclobenzaprine (FLEXERIL) 10 MG tablet     Sig: Take 1 tablet by mouth 3 times daily as needed for Muscle spasms     Dispense:  21 tablet     Refill:  0       Controlled Substances Monitoring:     DIAGNOSTIC RESULTS     EKG: All EKG's are interpreted by the Emergency Department Physician who either signs or Co-signs this chart in the absenceof a cardiologist.        RADIOLOGY: All images are read by the radiologist and their interpretations are reviewed.    No orders to display       No results found.    LABS:  No results found for this visit on 07/13/24.    EMERGENCY DEPARTMENT COURSE           Vitals:    Vitals:    07/13/24 1933   BP: 116/78   Pulse: 77   Resp: 16   Temp: 98.7 °F (37.1 °C)   TempSrc: Oral   SpO2: 99%   Weight: 89.8 kg (198 lb)   Height: 1.575 m (5' 2\")     -------------------------  BP: 116/78, Temp: 98.7 °F (37.1 °C), Pulse: 77, Respirations: 16      RE-EVALUATION:  See ED Course notes above.        CONSULTS:  None    PROCEDURES:  None    FINAL IMPRESSION      1. Strain of right trapezius muscle, initial encounter          DISPOSITION / PLAN     CONDITION ON DISPOSITION:   Good / Stable for discharge.     PATIENT REFERRED TO:  Rossy Carrillo, APRN - CNP  [address] [address]  [phone]    Call in 1 day      [facility] ED  [address] [facility] [id number] [phone]    If symptoms worsen      DISCHARGE MEDICATIONS:  Discharge Medication List as of 7/13/2024  7:50 PM        START taking these medications    Details   lidocaine (LIDODERM) 5 % Place 1 patch onto the skin daily for 10 days 12

## 2024-08-13 ENCOUNTER — OFFICE VISIT (OUTPATIENT)
Dept: FAMILY MEDICINE CLINIC | Age: 34
End: 2024-08-13
Payer: MEDICAID

## 2024-08-13 DIAGNOSIS — Z76.89 ENCOUNTER TO ESTABLISH CARE WITH NEW DOCTOR: Primary | ICD-10-CM

## 2024-08-13 DIAGNOSIS — Z13.1 DIABETES MELLITUS SCREENING: ICD-10-CM

## 2024-08-13 DIAGNOSIS — Z11.1 SCREENING FOR TUBERCULOSIS: ICD-10-CM

## 2024-08-13 DIAGNOSIS — F34.1 DYSTHYMIA: ICD-10-CM

## 2024-08-13 DIAGNOSIS — Z13.220 ENCOUNTER FOR LIPID SCREENING FOR CARDIOVASCULAR DISEASE: ICD-10-CM

## 2024-08-13 DIAGNOSIS — Z01.84 IMMUNITY STATUS TESTING: ICD-10-CM

## 2024-08-13 DIAGNOSIS — Z13.6 ENCOUNTER FOR LIPID SCREENING FOR CARDIOVASCULAR DISEASE: ICD-10-CM

## 2024-08-13 DIAGNOSIS — E66.09 CLASS 2 OBESITY DUE TO EXCESS CALORIES WITHOUT SERIOUS COMORBIDITY WITH BODY MASS INDEX (BMI) OF 36.0 TO 36.9 IN ADULT: ICD-10-CM

## 2024-08-13 PROCEDURE — 99203 OFFICE O/P NEW LOW 30 MIN: CPT | Performed by: FAMILY MEDICINE

## 2024-08-13 RX ORDER — FLUOXETINE 10 MG/1
10 CAPSULE ORAL DAILY
Qty: 30 CAPSULE | Refills: 3 | Status: SHIPPED | OUTPATIENT
Start: 2024-08-13

## 2024-08-13 SDOH — ECONOMIC STABILITY: FOOD INSECURITY: WITHIN THE PAST 12 MONTHS, THE FOOD YOU BOUGHT JUST DIDN'T LAST AND YOU DIDN'T HAVE MONEY TO GET MORE.: NEVER TRUE

## 2024-08-13 SDOH — ECONOMIC STABILITY: FOOD INSECURITY: WITHIN THE PAST 12 MONTHS, YOU WORRIED THAT YOUR FOOD WOULD RUN OUT BEFORE YOU GOT MONEY TO BUY MORE.: NEVER TRUE

## 2024-08-13 SDOH — ECONOMIC STABILITY: INCOME INSECURITY: HOW HARD IS IT FOR YOU TO PAY FOR THE VERY BASICS LIKE FOOD, HOUSING, MEDICAL CARE, AND HEATING?: NOT HARD AT ALL

## 2024-08-13 ASSESSMENT — PATIENT HEALTH QUESTIONNAIRE - PHQ9
SUM OF ALL RESPONSES TO PHQ QUESTIONS 1-9: 1
7. TROUBLE CONCENTRATING ON THINGS, SUCH AS READING THE NEWSPAPER OR WATCHING TELEVISION: NOT AT ALL
8. MOVING OR SPEAKING SO SLOWLY THAT OTHER PEOPLE COULD HAVE NOTICED. OR THE OPPOSITE, BEING SO FIGETY OR RESTLESS THAT YOU HAVE BEEN MOVING AROUND A LOT MORE THAN USUAL: NOT AT ALL
1. LITTLE INTEREST OR PLEASURE IN DOING THINGS: NOT AT ALL
SUM OF ALL RESPONSES TO PHQ QUESTIONS 1-9: 1
5. POOR APPETITE OR OVEREATING: NOT AT ALL
3. TROUBLE FALLING OR STAYING ASLEEP: NOT AT ALL
SUM OF ALL RESPONSES TO PHQ QUESTIONS 1-9: 1
SUM OF ALL RESPONSES TO PHQ9 QUESTIONS 1 & 2: 1
2. FEELING DOWN, DEPRESSED OR HOPELESS: SEVERAL DAYS
9. THOUGHTS THAT YOU WOULD BE BETTER OFF DEAD, OR OF HURTING YOURSELF: NOT AT ALL
10. IF YOU CHECKED OFF ANY PROBLEMS, HOW DIFFICULT HAVE THESE PROBLEMS MADE IT FOR YOU TO DO YOUR WORK, TAKE CARE OF THINGS AT HOME, OR GET ALONG WITH OTHER PEOPLE: NOT DIFFICULT AT ALL
6. FEELING BAD ABOUT YOURSELF - OR THAT YOU ARE A FAILURE OR HAVE LET YOURSELF OR YOUR FAMILY DOWN: NOT AT ALL
4. FEELING TIRED OR HAVING LITTLE ENERGY: NOT AT ALL
SUM OF ALL RESPONSES TO PHQ QUESTIONS 1-9: 1

## 2024-08-13 NOTE — PROGRESS NOTES
Stability: Unknown (8/13/2024)    Housing Stability Vital Sign     Unable to Pay for Housing in the Last Year: Not on file     Number of Times Moved in the Last Year: Not on file     Homeless in the Last Year: No        Family History   Problem Relation Age of Onset    High Cholesterol Mother     Other Mother         hypothyroid    No Known Problems Father     Thyroid Disease Maternal Grandmother     Dementia Maternal Grandmother     Asthma Sister     Cancer Maternal Grandfather         bladder    Other Paternal Grandmother         brain tumor    No Known Problems Paternal Grandfather        ADVANCE DIRECTIVE: N, <no information>    Vitals:    08/13/24 1130   BP: 121/79   Pulse: 83   Temp: 97.9 °F (36.6 °C)   SpO2: 99%   Weight: 89.8 kg (198 lb)   Height: 1.575 m (5' 2.01\")     Estimated body mass index is 36.21 kg/m² as calculated from the following:    Height as of this encounter: 1.575 m (5' 2.01\").    Weight as of this encounter: 89.8 kg (198 lb).       Objective   Physical Exam  HENT:   /79   Pulse 83   Temp 97.9 °F (36.6 °C)   Ht 1.575 m (5' 2.01\")   Wt 89.8 kg (198 lb)   SpO2 99%   Breastfeeding No   BMI 36.21 kg/m²   Constitutional: Alert and oriented.  Well-nourished.  Head: Normocephalic and atraumatic.   Right Ear: External ear normal. TM: no bulging, erythema or fluid seen.  Left Ear: External ear normal. TM: no bulging, erythema or fluid seen.  Nose: Nose normal.   Mouth/Throat: Oropharynx is clear and moist.  Teeth in very good repair.  Eyes: Pupils are equal, round, and reactive to light. Right eye exhibits no discharge. Left eye exhibits no discharge. No scleral icterus.   Neck: Normal range of motion. Neck supple. No JVD present. No tracheal deviation present. No thyromegaly present.   Cardiovascular: Normal rate, regular rhythm, normal heart sounds.   Pulmonary/Chest: Effort normal and breath sounds normal. No respiratory distress.  She has no wheezes.  She has no rales.   Abdominal:

## 2024-08-14 VITALS
OXYGEN SATURATION: 99 % | DIASTOLIC BLOOD PRESSURE: 79 MMHG | SYSTOLIC BLOOD PRESSURE: 121 MMHG | WEIGHT: 198 LBS | HEART RATE: 83 BPM | BODY MASS INDEX: 36.44 KG/M2 | HEIGHT: 62 IN | TEMPERATURE: 97.9 F

## 2024-09-13 ENCOUNTER — HOSPITAL ENCOUNTER (OUTPATIENT)
Age: 34
Setting detail: SPECIMEN
Discharge: HOME | End: 2024-09-13

## 2024-09-13 ENCOUNTER — OFFICE VISIT (OUTPATIENT)
Dept: FAMILY MEDICINE CLINIC | Age: 34
End: 2024-09-13
Payer: MEDICAID

## 2024-09-13 VITALS
DIASTOLIC BLOOD PRESSURE: 79 MMHG | BODY MASS INDEX: 36.32 KG/M2 | SYSTOLIC BLOOD PRESSURE: 114 MMHG | WEIGHT: 198.6 LBS | OXYGEN SATURATION: 99 % | HEART RATE: 84 BPM

## 2024-09-13 DIAGNOSIS — Z13.6 ENCOUNTER FOR LIPID SCREENING FOR CARDIOVASCULAR DISEASE: ICD-10-CM

## 2024-09-13 DIAGNOSIS — Z01.84 IMMUNITY STATUS TESTING: ICD-10-CM

## 2024-09-13 DIAGNOSIS — Z13.1 DIABETES MELLITUS SCREENING: ICD-10-CM

## 2024-09-13 DIAGNOSIS — H65.03 NON-RECURRENT ACUTE SEROUS OTITIS MEDIA OF BOTH EARS: Primary | ICD-10-CM

## 2024-09-13 DIAGNOSIS — Z13.220 ENCOUNTER FOR LIPID SCREENING FOR CARDIOVASCULAR DISEASE: ICD-10-CM

## 2024-09-13 LAB
ALBUMIN SERPL-MCNC: 4.4 G/DL (ref 3.5–5.2)
ALBUMIN/GLOB SERPL: 1 {RATIO} (ref 1–2.5)
ALP SERPL-CCNC: 70 U/L (ref 35–104)
ALT SERPL-CCNC: <5 U/L (ref 10–35)
ANION GAP SERPL CALCULATED.3IONS-SCNC: 7 MMOL/L (ref 9–16)
AST SERPL-CCNC: 15 U/L (ref 10–35)
BASOPHILS # BLD: 0.06 K/UL (ref 0–0.2)
BASOPHILS NFR BLD: 1 % (ref 0–2)
BILIRUB SERPL-MCNC: 0.3 MG/DL (ref 0–1.2)
BUN SERPL-MCNC: 16 MG/DL (ref 6–20)
CALCIUM SERPL-MCNC: 9.4 MG/DL (ref 8.6–10.4)
CHLORIDE SERPL-SCNC: 104 MMOL/L (ref 98–107)
CHOLEST SERPL-MCNC: 228 MG/DL (ref 0–199)
CHOLESTEROL/HDL RATIO: 5
CO2 SERPL-SCNC: 28 MMOL/L (ref 20–31)
CREAT SERPL-MCNC: 0.6 MG/DL (ref 0.5–0.9)
EOSINOPHIL # BLD: 0.18 K/UL (ref 0–0.44)
EOSINOPHILS RELATIVE PERCENT: 2 % (ref 1–4)
ERYTHROCYTE [DISTWIDTH] IN BLOOD BY AUTOMATED COUNT: 12.6 % (ref 11.8–14.4)
GFR, ESTIMATED: >90 ML/MIN/1.73M2
GLUCOSE SERPL-MCNC: 85 MG/DL (ref 74–99)
HBV SURFACE AB SERPL IA-ACNC: 357 MIU/ML
HCT VFR BLD AUTO: 40.6 % (ref 36.3–47.1)
HDLC SERPL-MCNC: 50 MG/DL
HGB BLD-MCNC: 12.9 G/DL (ref 11.9–15.1)
IMM GRANULOCYTES # BLD AUTO: 0.05 K/UL (ref 0–0.3)
IMM GRANULOCYTES NFR BLD: 0 %
LDLC SERPL CALC-MCNC: 168 MG/DL (ref 0–100)
LYMPHOCYTES NFR BLD: 3.44 K/UL (ref 1.1–3.7)
LYMPHOCYTES RELATIVE PERCENT: 30 % (ref 24–43)
MCH RBC QN AUTO: 27.7 PG (ref 25.2–33.5)
MCHC RBC AUTO-ENTMCNC: 31.8 G/DL (ref 28.4–34.8)
MCV RBC AUTO: 87.3 FL (ref 82.6–102.9)
MONOCYTES NFR BLD: 0.89 K/UL (ref 0.1–1.2)
MONOCYTES NFR BLD: 8 % (ref 3–12)
NEUTROPHILS NFR BLD: 59 % (ref 36–65)
NEUTS SEG NFR BLD: 6.81 K/UL (ref 1.5–8.1)
NRBC BLD-RTO: 0 PER 100 WBC
PLATELET # BLD AUTO: 375 K/UL (ref 138–453)
PMV BLD AUTO: 10.6 FL (ref 8.1–13.5)
POTASSIUM SERPL-SCNC: 4.5 MMOL/L (ref 3.7–5.3)
PROT SERPL-MCNC: 7.5 G/DL (ref 6.6–8.7)
RBC # BLD AUTO: 4.65 M/UL (ref 3.95–5.11)
RUBV IGG SERPL QL IA: >500 IU/ML
SODIUM SERPL-SCNC: 139 MMOL/L (ref 136–145)
T4 FREE SERPL-MCNC: 1.2 NG/DL (ref 0.92–1.68)
TRIGL SERPL-MCNC: 54 MG/DL
TSH SERPL DL<=0.05 MIU/L-ACNC: 0.18 UIU/ML (ref 0.27–4.2)
VLDLC SERPL CALC-MCNC: 11 MG/DL
WBC OTHER # BLD: 11.4 K/UL (ref 3.5–11.3)

## 2024-09-13 PROCEDURE — 99213 OFFICE O/P EST LOW 20 MIN: CPT | Performed by: FAMILY MEDICINE

## 2024-09-13 RX ORDER — CEFUROXIME AXETIL 500 MG/1
500 TABLET ORAL 2 TIMES DAILY
Qty: 20 TABLET | Refills: 0 | Status: SHIPPED | OUTPATIENT
Start: 2024-09-13 | End: 2024-09-23

## 2024-09-13 RX ORDER — GUAIFENESIN 600 MG/1
1200 TABLET, EXTENDED RELEASE ORAL 2 TIMES DAILY
Qty: 60 TABLET | Refills: 1 | Status: SHIPPED | OUTPATIENT
Start: 2024-09-13 | End: 2024-09-28

## 2024-09-13 ASSESSMENT — PATIENT HEALTH QUESTIONNAIRE - PHQ9
SUM OF ALL RESPONSES TO PHQ QUESTIONS 1-9: 0
1. LITTLE INTEREST OR PLEASURE IN DOING THINGS: NOT AT ALL
SUM OF ALL RESPONSES TO PHQ QUESTIONS 1-9: 0
2. FEELING DOWN, DEPRESSED OR HOPELESS: NOT AT ALL
SUM OF ALL RESPONSES TO PHQ9 QUESTIONS 1 & 2: 0
SUM OF ALL RESPONSES TO PHQ QUESTIONS 1-9: 0
SUM OF ALL RESPONSES TO PHQ QUESTIONS 1-9: 0

## 2024-09-16 LAB
C TETANI TOXOID IGG SERPL IA-ACNC: 4.8 IU/ML
MEV IGG SER-ACNC: 3.15
MUV IGG SER IA-ACNC: 4.55
VZV IGG SER QL IA: 2.16

## 2025-01-14 ENCOUNTER — OFFICE VISIT (OUTPATIENT)
Dept: FAMILY MEDICINE CLINIC | Age: 35
End: 2025-01-14

## 2025-01-14 VITALS
DIASTOLIC BLOOD PRESSURE: 69 MMHG | WEIGHT: 207 LBS | BODY MASS INDEX: 38.09 KG/M2 | HEIGHT: 62 IN | OXYGEN SATURATION: 96 % | SYSTOLIC BLOOD PRESSURE: 104 MMHG | HEART RATE: 118 BPM

## 2025-01-14 DIAGNOSIS — Z13.1 DIABETES MELLITUS SCREENING: ICD-10-CM

## 2025-01-14 DIAGNOSIS — Z13.6 ENCOUNTER FOR LIPID SCREENING FOR CARDIOVASCULAR DISEASE: ICD-10-CM

## 2025-01-14 DIAGNOSIS — E66.812 CLASS 2 OBESITY DUE TO EXCESS CALORIES WITHOUT SERIOUS COMORBIDITY WITH BODY MASS INDEX (BMI) OF 36.0 TO 36.9 IN ADULT: ICD-10-CM

## 2025-01-14 DIAGNOSIS — Z00.01 ENCOUNTER FOR WELL ADULT EXAM WITH ABNORMAL FINDINGS: Primary | ICD-10-CM

## 2025-01-14 DIAGNOSIS — E66.09 CLASS 2 OBESITY DUE TO EXCESS CALORIES WITHOUT SERIOUS COMORBIDITY WITH BODY MASS INDEX (BMI) OF 36.0 TO 36.9 IN ADULT: ICD-10-CM

## 2025-01-14 DIAGNOSIS — Z13.220 ENCOUNTER FOR LIPID SCREENING FOR CARDIOVASCULAR DISEASE: ICD-10-CM

## 2025-01-14 RX ORDER — PHENTERMINE HYDROCHLORIDE 37.5 MG/1
37.5 TABLET ORAL
Qty: 30 TABLET | Refills: 0 | Status: SHIPPED | OUTPATIENT
Start: 2025-01-14 | End: 2025-02-13

## 2025-01-14 SDOH — ECONOMIC STABILITY: FOOD INSECURITY: WITHIN THE PAST 12 MONTHS, THE FOOD YOU BOUGHT JUST DIDN'T LAST AND YOU DIDN'T HAVE MONEY TO GET MORE.: NEVER TRUE

## 2025-01-14 SDOH — ECONOMIC STABILITY: FOOD INSECURITY: WITHIN THE PAST 12 MONTHS, YOU WORRIED THAT YOUR FOOD WOULD RUN OUT BEFORE YOU GOT MONEY TO BUY MORE.: NEVER TRUE

## 2025-01-14 ASSESSMENT — PATIENT HEALTH QUESTIONNAIRE - PHQ9
SUM OF ALL RESPONSES TO PHQ QUESTIONS 1-9: 0
SUM OF ALL RESPONSES TO PHQ QUESTIONS 1-9: 0
SUM OF ALL RESPONSES TO PHQ9 QUESTIONS 1 & 2: 0
1. LITTLE INTEREST OR PLEASURE IN DOING THINGS: NOT AT ALL
SUM OF ALL RESPONSES TO PHQ QUESTIONS 1-9: 0
SUM OF ALL RESPONSES TO PHQ QUESTIONS 1-9: 0
2. FEELING DOWN, DEPRESSED OR HOPELESS: NOT AT ALL

## 2025-01-14 NOTE — PATIENT INSTRUCTIONS
120 mg capsules) is a medicine that reduces the amount of fat your body absorbs from the foods you eat. A lower-dose version is now available without a prescription (Anthony® 60 mg capsules) in many countries, including the [place]. The medicine is recommended three times per day, taken with a meal; you can skip a dose if you skip a meal or if the meal contains no fat.  After one year of treatment with orlistat, the average weight loss is approximately 8 to 10 percent of initial body weight (4 percent more than in those who took a placebo). Cholesterol levels often improve, and blood pressure sometimes falls. In people with diabetes, orlistat may help control blood sugar levels.  Side effects occur in 15 to 10 percent of people and may include stomach cramps, gas, diarrhea, leakage of stool, or oily stools. These problems are more likely when you take orlistat with a high-fat meal (if more than 30 percent of calories in the meal are from fat). Side effects usually improve as you learn to avoid high-fat foods. Severe liver injury has been reported rarely in patients taking orlistat, but it is not known if orlistat caused the liver problems.  Diet supplements -- Diet supplements are widely used by people who are trying to lose weight, although the safety and efficacy of these supplements are often unproven. A few of the more common diet supplements are discussed below; none of these are recommended because they have not been studied carefully, and there is no proof they are safe or effective.  Chitosan and wheat dextrin are ineffective for weight loss, and their use is not recommended.   Ephedra, a compound related to ephedrine, is no longer available in the [place] due to safety concerns. Many nonprescription diet pills previously contained ephedra. Although some studies have shown that ephedra helps with weight loss, there can be serious side effects (psychiatric symptoms, palpitations, and stomach upset),

## 2025-01-14 NOTE — PROGRESS NOTES
Well Adult Note  Name: Linda Delgadillo Today’s Date: 2025   MRN: [id number] Sex: Female   Age: 34 y.o. Ethnicity: Non- / Non    : 1990 Race: Black /       Linda Delgadillo is here for a well adult exam.       Assessment & Plan   Encounter for well adult exam with abnormal findings  Diabetes mellitus screening  -     Hemoglobin A1C - Lab draw; Future  Encounter for lipid screening for cardiovascular disease  -     Lipid Panel; Future  Class 2 obesity due to excess calories without serious comorbidity with body mass index (BMI) of 36.0 to 36.9 in adult  -     Lipid Panel; Future  -     Comprehensive Metabolic Panel; Future  -     CBC with Auto Differential; Future  -     TSH reflex to FT4; Future  -     Hemoglobin A1C - Lab draw; Future  -     phentermine (ADIPEX-P) 37.5 MG tablet; Take 1 tablet by mouth every morning (before breakfast) for 30 days. Max Daily Amount: 37.5 mg, Disp-30 tablet, R-0Normal     Discussed with patient weight loss is a lifestyle change.  She has been eating and drinking normal foods.  She needs to make sure to watch her portions she needs to make sure to avoid sugary drinks as well as fatty foods.  I will start on Adipex.  Discussed with her side effects discussed with her diet and exercises.  See me back in 4 weeks.  The discomfort of throat is probably due to a viral infection.  She will let me know if this gets worse or not.  Call or return to clinic prn if these symptoms worsen or fail to improve as anticipated.  I have reviewed the instructions with the patient, answering all questions to her satisfaction.    Return in about 4 weeks (around 2025), or if symptoms worsen or fail to improve, for weight.       Subjective   History:    History of Present Illness  The patient presents for a physical exam as well as evaluation of hoarseness and weight management.    She has been experiencing illness for the past week, characterized by

## 2025-02-11 ENCOUNTER — HOSPITAL ENCOUNTER (OUTPATIENT)
Age: 35
Setting detail: SPECIMEN
Discharge: HOME | End: 2025-02-11

## 2025-02-11 DIAGNOSIS — E66.812 CLASS 2 OBESITY DUE TO EXCESS CALORIES WITHOUT SERIOUS COMORBIDITY WITH BODY MASS INDEX (BMI) OF 36.0 TO 36.9 IN ADULT: ICD-10-CM

## 2025-02-11 DIAGNOSIS — Z13.1 DIABETES MELLITUS SCREENING: ICD-10-CM

## 2025-02-11 DIAGNOSIS — E66.09 CLASS 2 OBESITY DUE TO EXCESS CALORIES WITHOUT SERIOUS COMORBIDITY WITH BODY MASS INDEX (BMI) OF 36.0 TO 36.9 IN ADULT: ICD-10-CM

## 2025-02-11 DIAGNOSIS — Z13.220 ENCOUNTER FOR LIPID SCREENING FOR CARDIOVASCULAR DISEASE: ICD-10-CM

## 2025-02-11 DIAGNOSIS — Z13.6 ENCOUNTER FOR LIPID SCREENING FOR CARDIOVASCULAR DISEASE: ICD-10-CM

## 2025-02-11 LAB
ALBUMIN SERPL-MCNC: 4.3 G/DL (ref 3.5–5.2)
ALBUMIN/GLOB SERPL: 1.3 {RATIO} (ref 1–2.5)
ALP SERPL-CCNC: 66 U/L (ref 35–104)
ALT SERPL-CCNC: 7 U/L (ref 10–35)
ANION GAP SERPL CALCULATED.3IONS-SCNC: 10 MMOL/L (ref 9–16)
AST SERPL-CCNC: 17 U/L (ref 10–35)
BASOPHILS # BLD: 0.04 K/UL (ref 0–0.2)
BASOPHILS NFR BLD: 0 % (ref 0–2)
BILIRUB SERPL-MCNC: 0.4 MG/DL (ref 0–1.2)
BUN SERPL-MCNC: 16 MG/DL (ref 6–20)
CALCIUM SERPL-MCNC: 9.4 MG/DL (ref 8.6–10.4)
CHLORIDE SERPL-SCNC: 105 MMOL/L (ref 98–107)
CHOLEST SERPL-MCNC: 221 MG/DL (ref 0–199)
CHOLESTEROL/HDL RATIO: 5
CO2 SERPL-SCNC: 26 MMOL/L (ref 20–31)
CREAT SERPL-MCNC: 0.7 MG/DL (ref 0.6–0.9)
EOSINOPHIL # BLD: 0.15 K/UL (ref 0–0.44)
EOSINOPHILS RELATIVE PERCENT: 2 % (ref 1–4)
ERYTHROCYTE [DISTWIDTH] IN BLOOD BY AUTOMATED COUNT: 12.7 % (ref 11.8–14.4)
EST. AVERAGE GLUCOSE BLD GHB EST-MCNC: 103 MG/DL
GFR, ESTIMATED: >90 ML/MIN/1.73M2
GLUCOSE SERPL-MCNC: 85 MG/DL (ref 74–99)
HBA1C MFR BLD: 5.2 % (ref 4–6)
HCT VFR BLD AUTO: 42.8 % (ref 36.3–47.1)
HDLC SERPL-MCNC: 44 MG/DL
HGB BLD-MCNC: 13.4 G/DL (ref 11.9–15.1)
IMM GRANULOCYTES # BLD AUTO: <0.03 K/UL (ref 0–0.3)
IMM GRANULOCYTES NFR BLD: 0 %
LDLC SERPL CALC-MCNC: 165 MG/DL (ref 0–100)
LYMPHOCYTES NFR BLD: 3.17 K/UL (ref 1.1–3.7)
LYMPHOCYTES RELATIVE PERCENT: 32 % (ref 24–43)
MCH RBC QN AUTO: 27 PG (ref 25.2–33.5)
MCHC RBC AUTO-ENTMCNC: 31.3 G/DL (ref 28.4–34.8)
MCV RBC AUTO: 86.3 FL (ref 82.6–102.9)
MONOCYTES NFR BLD: 0.82 K/UL (ref 0.1–1.2)
MONOCYTES NFR BLD: 8 % (ref 3–12)
NEUTROPHILS NFR BLD: 58 % (ref 36–65)
NEUTS SEG NFR BLD: 5.69 K/UL (ref 1.5–8.1)
NRBC BLD-RTO: 0 PER 100 WBC
PLATELET # BLD AUTO: NORMAL K/UL (ref 138–453)
PLATELET, FLUORESCENCE: NORMAL K/UL (ref 138–453)
POTASSIUM SERPL-SCNC: 4.2 MMOL/L (ref 3.7–5.3)
PROT SERPL-MCNC: 7.5 G/DL (ref 6.6–8.7)
RBC # BLD AUTO: 4.96 M/UL (ref 3.95–5.11)
SODIUM SERPL-SCNC: 141 MMOL/L (ref 136–145)
T4 FREE SERPL-MCNC: 1.3 NG/DL (ref 0.9–1.7)
TRIGL SERPL-MCNC: 60 MG/DL
TSH SERPL DL<=0.05 MIU/L-ACNC: 0.18 UIU/ML (ref 0.27–4.2)
VLDLC SERPL CALC-MCNC: 12 MG/DL (ref 1–30)
WBC OTHER # BLD: 9.9 K/UL (ref 3.5–11.3)

## 2025-02-12 ENCOUNTER — OFFICE VISIT (OUTPATIENT)
Dept: FAMILY MEDICINE CLINIC | Age: 35
End: 2025-02-12
Payer: MEDICAID

## 2025-02-12 VITALS
TEMPERATURE: 97.3 F | WEIGHT: 191 LBS | DIASTOLIC BLOOD PRESSURE: 75 MMHG | SYSTOLIC BLOOD PRESSURE: 110 MMHG | HEART RATE: 85 BPM | BODY MASS INDEX: 34.93 KG/M2

## 2025-02-12 DIAGNOSIS — E66.812 CLASS 2 OBESITY DUE TO EXCESS CALORIES WITHOUT SERIOUS COMORBIDITY WITH BODY MASS INDEX (BMI) OF 36.0 TO 36.9 IN ADULT: ICD-10-CM

## 2025-02-12 DIAGNOSIS — J02.9 PHARYNGITIS, UNSPECIFIED ETIOLOGY: Primary | ICD-10-CM

## 2025-02-12 DIAGNOSIS — E66.09 CLASS 2 OBESITY DUE TO EXCESS CALORIES WITHOUT SERIOUS COMORBIDITY WITH BODY MASS INDEX (BMI) OF 36.0 TO 36.9 IN ADULT: ICD-10-CM

## 2025-02-12 PROCEDURE — 99213 OFFICE O/P EST LOW 20 MIN: CPT | Performed by: FAMILY MEDICINE

## 2025-02-12 RX ORDER — FAMOTIDINE 20 MG/1
20 TABLET, FILM COATED ORAL 2 TIMES DAILY
Qty: 60 TABLET | Refills: 3 | Status: SHIPPED | OUTPATIENT
Start: 2025-02-12

## 2025-02-12 RX ORDER — PHENTERMINE HYDROCHLORIDE 37.5 MG/1
37.5 TABLET ORAL
Qty: 30 TABLET | Refills: 0 | Status: SHIPPED | OUTPATIENT
Start: 2025-02-12 | End: 2025-02-16

## 2025-02-12 ASSESSMENT — PATIENT HEALTH QUESTIONNAIRE - PHQ9
5. POOR APPETITE OR OVEREATING: NOT AT ALL
2. FEELING DOWN, DEPRESSED OR HOPELESS: NOT AT ALL
SUM OF ALL RESPONSES TO PHQ QUESTIONS 1-9: 0
SUM OF ALL RESPONSES TO PHQ9 QUESTIONS 1 & 2: 0
SUM OF ALL RESPONSES TO PHQ QUESTIONS 1-9: 0
8. MOVING OR SPEAKING SO SLOWLY THAT OTHER PEOPLE COULD HAVE NOTICED. OR THE OPPOSITE, BEING SO FIGETY OR RESTLESS THAT YOU HAVE BEEN MOVING AROUND A LOT MORE THAN USUAL: NOT AT ALL
3. TROUBLE FALLING OR STAYING ASLEEP: NOT AT ALL
4. FEELING TIRED OR HAVING LITTLE ENERGY: NOT AT ALL
SUM OF ALL RESPONSES TO PHQ QUESTIONS 1-9: 0
9. THOUGHTS THAT YOU WOULD BE BETTER OFF DEAD, OR OF HURTING YOURSELF: NOT AT ALL
10. IF YOU CHECKED OFF ANY PROBLEMS, HOW DIFFICULT HAVE THESE PROBLEMS MADE IT FOR YOU TO DO YOUR WORK, TAKE CARE OF THINGS AT HOME, OR GET ALONG WITH OTHER PEOPLE: NOT DIFFICULT AT ALL
1. LITTLE INTEREST OR PLEASURE IN DOING THINGS: NOT AT ALL
7. TROUBLE CONCENTRATING ON THINGS, SUCH AS READING THE NEWSPAPER OR WATCHING TELEVISION: NOT AT ALL
6. FEELING BAD ABOUT YOURSELF - OR THAT YOU ARE A FAILURE OR HAVE LET YOURSELF OR YOUR FAMILY DOWN: NOT AT ALL
SUM OF ALL RESPONSES TO PHQ QUESTIONS 1-9: 0

## 2025-02-12 NOTE — PROGRESS NOTES
ibuprofen (ADVIL;MOTRIN) 600 MG tablet Take 1 tablet by mouth every 6 hours as needed for Pain (Patient not taking: Reported on 4/4/2023) 30 tablet 0    acetaminophen (TYLENOL) 500 MG tablet Take 2 tablets by mouth every 6 hours as needed for Pain (Patient not taking: Reported on 4/4/2023) 120 tablet 0    Mom to be Belts MISC by Does not apply route (Patient not taking: Reported on 1/12/2023) 1 each 0    ondansetron (ZOFRAN-ODT) 4 MG disintegrating tablet Take 1 tablet by mouth 3 times daily as needed for Nausea or Vomiting (Patient not taking: Reported on 12/22/2022) 21 tablet 0     No current facility-administered medications for this visit.     ALLERGIES:  No Known Allergies    Social History     Tobacco Use    Smoking status: Never    Smokeless tobacco: Never   Substance Use Topics    Alcohol use: No      Body mass index is 34.93 kg/m².  /75   Pulse 85   Temp 97.3 °F (36.3 °C)   Wt 86.6 kg (191 lb)   BMI 34.93 kg/m²     Subjective:      HPI    History of Present Illness  The patient presents for weight loss, ear discomfort, and throat issues.    She has experienced a significant weight loss of 16 pounds, which she attributes to a decreased appetite. She has been consuming small portions and engaging in physical exercise. She has expressed interest in resuming gym activities and is considering the use of creatine for muscle development. Additionally, she is contemplating increasing her protein intake to facilitate muscle growth.    She reports experiencing discomfort in her ear, suspecting it may be due to an infection. She describes the sensation as pressure-like, accompanied by itching within the ear canal.    She has been experiencing persistent hoarseness of voice, which she believes may be related to dry mouth. She describes the sensation as if her vocal cords are being constricted. She does not have any symptoms of acid reflux.    Review of Systems   Constitutional: Negative for fever and

## 2025-02-14 DIAGNOSIS — E66.09 CLASS 2 OBESITY DUE TO EXCESS CALORIES WITHOUT SERIOUS COMORBIDITY WITH BODY MASS INDEX (BMI) OF 36.0 TO 36.9 IN ADULT: ICD-10-CM

## 2025-02-14 DIAGNOSIS — E66.812 CLASS 2 OBESITY DUE TO EXCESS CALORIES WITHOUT SERIOUS COMORBIDITY WITH BODY MASS INDEX (BMI) OF 36.0 TO 36.9 IN ADULT: ICD-10-CM

## 2025-02-14 NOTE — TELEPHONE ENCOUNTER
Linda Delgadillo is calling to request a refill on the following medication(s):    Last Visit Date (If Applicable):  2/12/2025    Next Visit Date:    3/12/2025    Medication Request:  Requested Prescriptions     Pending Prescriptions Disp Refills    phentermine (ADIPEX-P) 37.5 MG tablet [Pharmacy Med Name: Phentermine HCl 37.5 MG Oral Tablet] 30 tablet 0     Sig: TAKE 1 TABLET BY MOUTH IN THE MORNING BEFORE BREAKFAST FOR 30 DAYS . DO NOT EXCEED 1 PER 24 HOURS

## 2025-02-16 RX ORDER — PHENTERMINE HYDROCHLORIDE 37.5 MG/1
TABLET ORAL
Qty: 30 TABLET | Refills: 0 | Status: SHIPPED | OUTPATIENT
Start: 2025-02-16 | End: 2025-03-18

## 2025-03-12 ENCOUNTER — OFFICE VISIT (OUTPATIENT)
Dept: FAMILY MEDICINE CLINIC | Age: 35
End: 2025-03-12
Payer: MEDICAID

## 2025-03-12 VITALS
HEART RATE: 84 BPM | TEMPERATURE: 98.2 F | SYSTOLIC BLOOD PRESSURE: 104 MMHG | OXYGEN SATURATION: 99 % | BODY MASS INDEX: 34.02 KG/M2 | DIASTOLIC BLOOD PRESSURE: 74 MMHG | WEIGHT: 186 LBS

## 2025-03-12 DIAGNOSIS — E66.09 CLASS 2 OBESITY DUE TO EXCESS CALORIES WITHOUT SERIOUS COMORBIDITY WITH BODY MASS INDEX (BMI) OF 36.0 TO 36.9 IN ADULT: ICD-10-CM

## 2025-03-12 DIAGNOSIS — E66.812 CLASS 2 OBESITY DUE TO EXCESS CALORIES WITHOUT SERIOUS COMORBIDITY WITH BODY MASS INDEX (BMI) OF 36.0 TO 36.9 IN ADULT: ICD-10-CM

## 2025-03-12 PROCEDURE — 99213 OFFICE O/P EST LOW 20 MIN: CPT | Performed by: FAMILY MEDICINE

## 2025-03-12 RX ORDER — PHENTERMINE HYDROCHLORIDE 37.5 MG/1
37.5 TABLET ORAL
Qty: 30 TABLET | Refills: 0 | Status: SHIPPED | OUTPATIENT
Start: 2025-03-12 | End: 2025-04-11

## 2025-03-12 ASSESSMENT — PATIENT HEALTH QUESTIONNAIRE - PHQ9
9. THOUGHTS THAT YOU WOULD BE BETTER OFF DEAD, OR OF HURTING YOURSELF: NOT AT ALL
8. MOVING OR SPEAKING SO SLOWLY THAT OTHER PEOPLE COULD HAVE NOTICED. OR THE OPPOSITE, BEING SO FIGETY OR RESTLESS THAT YOU HAVE BEEN MOVING AROUND A LOT MORE THAN USUAL: NOT AT ALL
6. FEELING BAD ABOUT YOURSELF - OR THAT YOU ARE A FAILURE OR HAVE LET YOURSELF OR YOUR FAMILY DOWN: NOT AT ALL
1. LITTLE INTEREST OR PLEASURE IN DOING THINGS: NOT AT ALL
7. TROUBLE CONCENTRATING ON THINGS, SUCH AS READING THE NEWSPAPER OR WATCHING TELEVISION: NOT AT ALL
SUM OF ALL RESPONSES TO PHQ QUESTIONS 1-9: 0
4. FEELING TIRED OR HAVING LITTLE ENERGY: NOT AT ALL
5. POOR APPETITE OR OVEREATING: NOT AT ALL
SUM OF ALL RESPONSES TO PHQ QUESTIONS 1-9: 0
3. TROUBLE FALLING OR STAYING ASLEEP: NOT AT ALL
10. IF YOU CHECKED OFF ANY PROBLEMS, HOW DIFFICULT HAVE THESE PROBLEMS MADE IT FOR YOU TO DO YOUR WORK, TAKE CARE OF THINGS AT HOME, OR GET ALONG WITH OTHER PEOPLE: NOT DIFFICULT AT ALL
2. FEELING DOWN, DEPRESSED OR HOPELESS: NOT AT ALL
SUM OF ALL RESPONSES TO PHQ QUESTIONS 1-9: 0
SUM OF ALL RESPONSES TO PHQ QUESTIONS 1-9: 0
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
## 2025-04-16 ENCOUNTER — OFFICE VISIT (OUTPATIENT)
Dept: FAMILY MEDICINE CLINIC | Age: 35
End: 2025-04-16
Payer: MEDICAID

## 2025-04-16 VITALS
TEMPERATURE: 97 F | SYSTOLIC BLOOD PRESSURE: 105 MMHG | OXYGEN SATURATION: 98 % | HEART RATE: 85 BPM | BODY MASS INDEX: 33.65 KG/M2 | WEIGHT: 184 LBS | DIASTOLIC BLOOD PRESSURE: 72 MMHG

## 2025-04-16 DIAGNOSIS — E66.812 CLASS 2 OBESITY DUE TO EXCESS CALORIES WITHOUT SERIOUS COMORBIDITY WITH BODY MASS INDEX (BMI) OF 36.0 TO 36.9 IN ADULT: ICD-10-CM

## 2025-04-16 DIAGNOSIS — E66.09 CLASS 2 OBESITY DUE TO EXCESS CALORIES WITHOUT SERIOUS COMORBIDITY WITH BODY MASS INDEX (BMI) OF 36.0 TO 36.9 IN ADULT: ICD-10-CM

## 2025-04-16 PROCEDURE — 99213 OFFICE O/P EST LOW 20 MIN: CPT | Performed by: FAMILY MEDICINE

## 2025-04-16 RX ORDER — PHENTERMINE HYDROCHLORIDE 37.5 MG/1
37.5 TABLET ORAL
Qty: 30 TABLET | Refills: 0 | Status: SHIPPED | OUTPATIENT
Start: 2025-04-16 | End: 2025-05-16

## 2025-04-16 ASSESSMENT — PATIENT HEALTH QUESTIONNAIRE - PHQ9
3. TROUBLE FALLING OR STAYING ASLEEP: NOT AT ALL
SUM OF ALL RESPONSES TO PHQ QUESTIONS 1-9: 0
2. FEELING DOWN, DEPRESSED OR HOPELESS: NOT AT ALL
SUM OF ALL RESPONSES TO PHQ QUESTIONS 1-9: 0
9. THOUGHTS THAT YOU WOULD BE BETTER OFF DEAD, OR OF HURTING YOURSELF: NOT AT ALL
7. TROUBLE CONCENTRATING ON THINGS, SUCH AS READING THE NEWSPAPER OR WATCHING TELEVISION: NOT AT ALL
5. POOR APPETITE OR OVEREATING: NOT AT ALL
8. MOVING OR SPEAKING SO SLOWLY THAT OTHER PEOPLE COULD HAVE NOTICED. OR THE OPPOSITE, BEING SO FIGETY OR RESTLESS THAT YOU HAVE BEEN MOVING AROUND A LOT MORE THAN USUAL: NOT AT ALL
10. IF YOU CHECKED OFF ANY PROBLEMS, HOW DIFFICULT HAVE THESE PROBLEMS MADE IT FOR YOU TO DO YOUR WORK, TAKE CARE OF THINGS AT HOME, OR GET ALONG WITH OTHER PEOPLE: NOT DIFFICULT AT ALL
SUM OF ALL RESPONSES TO PHQ QUESTIONS 1-9: 0
SUM OF ALL RESPONSES TO PHQ QUESTIONS 1-9: 0
4. FEELING TIRED OR HAVING LITTLE ENERGY: NOT AT ALL
6. FEELING BAD ABOUT YOURSELF - OR THAT YOU ARE A FAILURE OR HAVE LET YOURSELF OR YOUR FAMILY DOWN: NOT AT ALL
1. LITTLE INTEREST OR PLEASURE IN DOING THINGS: NOT AT ALL

## 2025-04-16 NOTE — PROGRESS NOTES
answered.  Patient voiced understanding.      Electronically signed by Praveena Hemphill MD on 4/16/2025 at 11:24 AM       (Please note that portions of this note were completed with a voice recognition program. Efforts were made to edit the dictations but occasionally words are mis-transcribed.)    The patient (or guardian, if applicable) and other individuals in attendance with the patient were advised that Artificial Intelligence will be utilized during this visit to record, process the conversation to generate a clinical note, and support improvement of the AI technology. The patient (or guardian, if applicable) and other individuals in attendance at the appointment consented to the use of AI, including the recording.

## 2025-08-14 ENCOUNTER — OFFICE VISIT (OUTPATIENT)
Dept: FAMILY MEDICINE CLINIC | Age: 35
End: 2025-08-14
Payer: MEDICAID

## 2025-08-14 VITALS
HEART RATE: 85 BPM | WEIGHT: 191 LBS | SYSTOLIC BLOOD PRESSURE: 107 MMHG | OXYGEN SATURATION: 99 % | DIASTOLIC BLOOD PRESSURE: 75 MMHG | HEIGHT: 62 IN | BODY MASS INDEX: 35.15 KG/M2 | TEMPERATURE: 97.9 F

## 2025-08-14 DIAGNOSIS — F34.1 DYSTHYMIA: ICD-10-CM

## 2025-08-14 DIAGNOSIS — E66.09 CLASS 2 OBESITY DUE TO EXCESS CALORIES WITHOUT SERIOUS COMORBIDITY WITH BODY MASS INDEX (BMI) OF 36.0 TO 36.9 IN ADULT: ICD-10-CM

## 2025-08-14 DIAGNOSIS — E66.812 CLASS 2 OBESITY DUE TO EXCESS CALORIES WITHOUT SERIOUS COMORBIDITY WITH BODY MASS INDEX (BMI) OF 36.0 TO 36.9 IN ADULT: ICD-10-CM

## 2025-08-14 PROCEDURE — 99214 OFFICE O/P EST MOD 30 MIN: CPT | Performed by: FAMILY MEDICINE

## 2025-08-14 RX ORDER — FLUOXETINE 10 MG/1
10 CAPSULE ORAL DAILY
Qty: 30 CAPSULE | Refills: 3 | Status: SHIPPED | OUTPATIENT
Start: 2025-08-14

## 2025-08-14 RX ORDER — PHENTERMINE HYDROCHLORIDE 37.5 MG/1
37.5 TABLET ORAL
Qty: 30 TABLET | Refills: 0 | Status: SHIPPED | OUTPATIENT
Start: 2025-08-14 | End: 2025-09-13

## 2025-08-14 ASSESSMENT — PATIENT HEALTH QUESTIONNAIRE - PHQ9
SUM OF ALL RESPONSES TO PHQ QUESTIONS 1-9: 0
1. LITTLE INTEREST OR PLEASURE IN DOING THINGS: NOT AT ALL
3. TROUBLE FALLING OR STAYING ASLEEP: NOT AT ALL
7. TROUBLE CONCENTRATING ON THINGS, SUCH AS READING THE NEWSPAPER OR WATCHING TELEVISION: NOT AT ALL
SUM OF ALL RESPONSES TO PHQ QUESTIONS 1-9: 0
8. MOVING OR SPEAKING SO SLOWLY THAT OTHER PEOPLE COULD HAVE NOTICED. OR THE OPPOSITE, BEING SO FIGETY OR RESTLESS THAT YOU HAVE BEEN MOVING AROUND A LOT MORE THAN USUAL: NOT AT ALL
5. POOR APPETITE OR OVEREATING: NOT AT ALL
2. FEELING DOWN, DEPRESSED OR HOPELESS: NOT AT ALL
SUM OF ALL RESPONSES TO PHQ QUESTIONS 1-9: 0
6. FEELING BAD ABOUT YOURSELF - OR THAT YOU ARE A FAILURE OR HAVE LET YOURSELF OR YOUR FAMILY DOWN: NOT AT ALL
9. THOUGHTS THAT YOU WOULD BE BETTER OFF DEAD, OR OF HURTING YOURSELF: NOT AT ALL
4. FEELING TIRED OR HAVING LITTLE ENERGY: NOT AT ALL
SUM OF ALL RESPONSES TO PHQ QUESTIONS 1-9: 0
10. IF YOU CHECKED OFF ANY PROBLEMS, HOW DIFFICULT HAVE THESE PROBLEMS MADE IT FOR YOU TO DO YOUR WORK, TAKE CARE OF THINGS AT HOME, OR GET ALONG WITH OTHER PEOPLE: NOT DIFFICULT AT ALL